# Patient Record
Sex: MALE | Race: WHITE | NOT HISPANIC OR LATINO | Employment: FULL TIME | URBAN - METROPOLITAN AREA
[De-identification: names, ages, dates, MRNs, and addresses within clinical notes are randomized per-mention and may not be internally consistent; named-entity substitution may affect disease eponyms.]

---

## 2017-02-28 ENCOUNTER — OFFICE VISIT (OUTPATIENT)
Dept: FAMILY MEDICINE CLINIC | Facility: CLINIC | Age: 42
End: 2017-02-28

## 2017-02-28 VITALS
HEART RATE: 80 BPM | BODY MASS INDEX: 33.89 KG/M2 | TEMPERATURE: 97.6 F | SYSTOLIC BLOOD PRESSURE: 110 MMHG | RESPIRATION RATE: 16 BRPM | HEIGHT: 72 IN | OXYGEN SATURATION: 98 % | DIASTOLIC BLOOD PRESSURE: 80 MMHG | WEIGHT: 250.2 LBS

## 2017-02-28 DIAGNOSIS — R53.81 MALAISE: ICD-10-CM

## 2017-02-28 DIAGNOSIS — J40 BRONCHITIS: ICD-10-CM

## 2017-02-28 DIAGNOSIS — Z76.89 ENCOUNTER TO ESTABLISH CARE: Primary | ICD-10-CM

## 2017-02-28 LAB
EXPIRATION DATE: NORMAL
FLUAV AG NPH QL: NORMAL
FLUBV AG NPH QL: NORMAL
INTERNAL CONTROL: NORMAL
Lab: NORMAL

## 2017-02-28 PROCEDURE — 99203 OFFICE O/P NEW LOW 30 MIN: CPT | Performed by: PHYSICIAN ASSISTANT

## 2017-02-28 PROCEDURE — 87804 INFLUENZA ASSAY W/OPTIC: CPT | Performed by: PHYSICIAN ASSISTANT

## 2017-02-28 RX ORDER — ALBUTEROL SULFATE 90 UG/1
2 AEROSOL, METERED RESPIRATORY (INHALATION) EVERY 4 HOURS PRN
Qty: 1 INHALER | Refills: 0 | Status: SHIPPED | OUTPATIENT
Start: 2017-02-28

## 2017-02-28 RX ORDER — AZITHROMYCIN 250 MG/1
TABLET, FILM COATED ORAL
Qty: 6 TABLET | Refills: 0 | Status: SHIPPED | OUTPATIENT
Start: 2017-02-28

## 2017-02-28 RX ORDER — DEXTROMETHORPHAN HYDROBROMIDE AND PROMETHAZINE HYDROCHLORIDE 15; 6.25 MG/5ML; MG/5ML
5 SYRUP ORAL 4 TIMES DAILY PRN
Qty: 180 ML | Refills: 0 | Status: SHIPPED | OUTPATIENT
Start: 2017-02-28

## 2017-02-28 RX ORDER — PREDNISONE 10 MG/1
TABLET ORAL
Qty: 21 TABLET | Refills: 0 | Status: SHIPPED | OUTPATIENT
Start: 2017-02-28

## 2017-02-28 NOTE — PROGRESS NOTES
Subjective   Hussain Feliz is a 41 y.o. male.     History of Present Illness   Patient presents to [facility] care with CC of fever, malaise, body aches, chest congestion with tightness, SOB, productive cough, sinus congestion, frontal HA, ear ache (bilateral) X 4-5 days. No known flu exposure. Symptoms are gradually getting worse. Tried cough and cold preps OTC with no relief. Coughing fits leave him exhausted. Feels miserable. No chest pain or abdominal pain.   No significant PMH, usually pretty healthy     The following portions of the patient's history were reviewed and updated as appropriate: allergies, current medications, past family history, past medical history, past social history, past surgical history and problem list.    Review of Systems   Constitutional: Positive for activity change, appetite change, fatigue and fever. Negative for chills and diaphoresis.   HENT: Positive for congestion, ear pain, postnasal drip, sinus pressure and sore throat. Negative for ear discharge, hearing loss, nosebleeds and sneezing.    Eyes: Negative.    Respiratory: Positive for cough, chest tightness, shortness of breath and wheezing.    Cardiovascular: Negative.  Negative for chest pain, palpitations and leg swelling.   Gastrointestinal: Negative for abdominal distention, abdominal pain, anal bleeding, blood in stool, constipation, diarrhea, nausea, rectal pain and vomiting.   Endocrine: Negative.  Negative for cold intolerance, heat intolerance, polydipsia, polyphagia and polyuria.   Genitourinary: Negative.  Negative for difficulty urinating, dysuria, flank pain, frequency, hematuria and urgency.   Musculoskeletal: Positive for myalgias. Negative for arthralgias, back pain, gait problem, joint swelling, neck pain and neck stiffness.   Skin: Negative.  Negative for color change, pallor, rash and wound.   Allergic/Immunologic: Negative.  Negative for immunocompromised state.   Neurological: Positive for headaches. Negative  "for dizziness, syncope, weakness, light-headedness and numbness.   Hematological: Negative.  Negative for adenopathy. Does not bruise/bleed easily.   Psychiatric/Behavioral: Positive for sleep disturbance. Negative for behavioral problems, confusion, self-injury and suicidal ideas. The patient is not nervous/anxious.        Objective    Blood pressure 110/80, pulse 80, temperature 97.6 °F (36.4 °C), resp. rate 16, height 72\" (182.9 cm), weight 250 lb 3.2 oz (113 kg), SpO2 98 %.     Physical Exam   Constitutional: He is oriented to person, place, and time. He appears well-developed and well-nourished. He appears ill.   HENT:   Head: Normocephalic and atraumatic.   Right Ear: External ear and ear canal normal. Tympanic membrane is retracted. Tympanic membrane is not perforated, not erythematous and not bulging.   Left Ear: External ear and ear canal normal. Tympanic membrane is retracted. Tympanic membrane is not perforated, not erythematous and not bulging.   Nose: Rhinorrhea present. Right sinus exhibits frontal sinus tenderness. Right sinus exhibits no maxillary sinus tenderness. Left sinus exhibits frontal sinus tenderness. Left sinus exhibits no maxillary sinus tenderness.   Mouth/Throat: Uvula is midline and mucous membranes are normal. Posterior oropharyngeal erythema present. No oropharyngeal exudate or posterior oropharyngeal edema.   Some bleeding in R naris    Eyes: Conjunctivae and EOM are normal. Pupils are equal, round, and reactive to light.   Neck: Normal range of motion. Neck supple. No tracheal deviation present. No thyromegaly present.   Cardiovascular: Normal rate, regular rhythm, normal heart sounds and intact distal pulses.  Exam reveals no gallop and no friction rub.    No murmur heard.  Pulmonary/Chest: Effort normal. No respiratory distress. He has wheezes. He has no rales. He exhibits no tenderness.   Frequent hacking cough    Abdominal: Soft. Bowel sounds are normal. He exhibits no " distension and no mass. There is no tenderness. There is no rebound and no guarding. No hernia.   Lymphadenopathy:     He has no cervical adenopathy.   Neurological: He is alert and oriented to person, place, and time.   Skin: Skin is warm and dry.   Psychiatric: He has a normal mood and affect. His behavior is normal. Judgment and thought content normal.   Nursing note and vitals reviewed.      Assessment/Plan   Hussain was seen today for np / establish care and malaise, earaches, sore throat, cough & chest congestion.    Diagnoses and all orders for this visit:    Encounter to establish care    Malaise  -     POCT Influenza A/B    Bronchitis  -     azithromycin (ZITHROMAX Z-BUBBA) 250 MG tablet; Take 2 tablets the first day, then 1 tablet daily for 4 days.  -     predniSONE (DELTASONE) 10 MG tablet; Take 60 mg po day 1, 50 mg po day 2, 40 mg po day 3, 30 mg po day 4, 20 mg po day 5, and 10 mg po day 6  -     albuterol (PROVENTIL HFA;VENTOLIN HFA) 108 (90 BASE) MCG/ACT inhaler; Inhale 2 puffs Every 4 (Four) Hours As Needed for wheezing.  -     promethazine-dextromethorphan (PROMETHAZINE-DM) 6.25-15 MG/5ML syrup; Take 5 mL by mouth 4 (Four) Times a Day As Needed for cough.      Influenza A and B negative. Results discussed with patient. Begin treatment as outlined in plan. F/U if symptoms do not improve or if new or worsening symptoms develop. Pt agrees.

## 2022-02-07 ENCOUNTER — TRANSCRIBE ORDERS (OUTPATIENT)
Dept: ADMINISTRATIVE | Facility: HOSPITAL | Age: 47
End: 2022-02-07

## 2022-02-07 ENCOUNTER — HOSPITAL ENCOUNTER (OUTPATIENT)
Dept: GENERAL RADIOLOGY | Facility: HOSPITAL | Age: 47
Discharge: HOME | End: 2022-02-07
Admitting: PHYSICIAN ASSISTANT
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
## 2022-02-07 DIAGNOSIS — M79.605 PAIN IN LEFT LEG: ICD-10-CM

## 2022-02-07 PROCEDURE — 73560 X-RAY EXAM OF KNEE 1 OR 2: CPT

## 2022-02-07 PROCEDURE — 73502 X-RAY EXAM HIP UNI 2-3 VIEWS: CPT

## 2022-02-07 PROCEDURE — 73552 X-RAY EXAM OF FEMUR 2/>: CPT

## 2023-08-28 ENCOUNTER — OFFICE VISIT (OUTPATIENT)
Dept: FAMILY MEDICINE CLINIC | Facility: CLINIC | Age: 48
End: 2023-08-28
Payer: COMMERCIAL

## 2023-08-28 VITALS
SYSTOLIC BLOOD PRESSURE: 126 MMHG | BODY MASS INDEX: 35.7 KG/M2 | WEIGHT: 255 LBS | HEIGHT: 71 IN | TEMPERATURE: 98 F | RESPIRATION RATE: 18 BRPM | OXYGEN SATURATION: 99 % | HEART RATE: 74 BPM | DIASTOLIC BLOOD PRESSURE: 86 MMHG

## 2023-08-28 DIAGNOSIS — R53.82 CHRONIC FATIGUE: ICD-10-CM

## 2023-08-28 DIAGNOSIS — Z13.220 ENCOUNTER FOR LIPID SCREENING FOR CARDIOVASCULAR DISEASE: ICD-10-CM

## 2023-08-28 DIAGNOSIS — Z13.1 SCREENING FOR DIABETES MELLITUS: ICD-10-CM

## 2023-08-28 DIAGNOSIS — Z11.59 NEED FOR HEPATITIS C SCREENING TEST: ICD-10-CM

## 2023-08-28 DIAGNOSIS — K21.9 GASTROESOPHAGEAL REFLUX DISEASE, UNSPECIFIED WHETHER ESOPHAGITIS PRESENT: ICD-10-CM

## 2023-08-28 DIAGNOSIS — Z00.00 ENCOUNTER FOR WELL ADULT EXAM WITHOUT ABNORMAL FINDINGS: Primary | ICD-10-CM

## 2023-08-28 DIAGNOSIS — R79.0 ABNORMAL BLOOD LEVEL OF IRON: ICD-10-CM

## 2023-08-28 DIAGNOSIS — Z13.6 ENCOUNTER FOR LIPID SCREENING FOR CARDIOVASCULAR DISEASE: ICD-10-CM

## 2023-08-28 PROCEDURE — 99386 PREV VISIT NEW AGE 40-64: CPT | Performed by: PHYSICIAN ASSISTANT

## 2023-08-28 RX ORDER — OMEPRAZOLE 20 MG/1
20 CAPSULE, DELAYED RELEASE ORAL DAILY
Qty: 90 CAPSULE | Refills: 3 | Status: SHIPPED | OUTPATIENT
Start: 2023-08-28

## 2023-08-28 NOTE — PROGRESS NOTES
Chief Complaint   Patient presents with    Annual Exam     New patient-open care gaps-not sure date of last tdap-colonoscopy completed 10-12 years ago       Subjective   The patient is a 48 y.o. male who presents to [facility] and for annual exam.    It has been a while since the patient has had blood work.    The patient denies any allergies. Suffers from reflux and heartburn nightly. He does not take medication for gastroesophageal reflux. He has tried TUMS in the past but they do not work. Symptoms have been going on for years. The patient does not eat after 6:00 PM.  His last colonoscopy 12 years ago revealed some polyps. Due for repeat but does not want to schedule at this time     Told he had mild hemochromatosis in the past and was directed by hematology to do regular blood donations. He has not been doing this. States he saw a GI later who told him he did not have hemochromatosis. No routine monitoring of iron levels     The patient has a family history of diabetes and obesity. His son has recently been diagnosed with Mixed Connective Tissue Disease, MCTD. His mother has a lung disease. She was never a smoker, and he was told that it was not hereditary. There is a family history of hypertension. He denies any excess thirst or urination.    The patient's energy level has been down. He had his testosterone checked and it was 239 ng/dl. He was given some capsules to take, and over the last 5 weeks, he feels as if his energy levels are improving. This is being done at a facility in [place], can not remember the name.    The patient denies having sleep apnea, but, according to his wife, he does snore. He notices that, no matter how many hours of sleep he gets, he still experiences daytime fatigue. No hx of sleep study     The patient declines additional COVID-19 vaccines. The patient sees his optometrist and dentist regularly. He underwent Lasix in 2004.    Follows with eye doctor and dentist regularly. Hx  "of Lasix surgery.        Last Colonoscopy:  approximately 12 years ago. Denies updating at this time       Past Medical History,Medications, Allergies reviewed.     Objective     /86   Pulse 74   Temp 98 øF (36.7 øC)   Resp 18   Ht 180.3 cm (71\")   Wt 116 kg (255 lb)   SpO2 99%   BMI 35.57 kg/mý     Physical Exam  Vitals and nursing note reviewed.   Constitutional:       Appearance: Normal appearance. He is well-developed.   HENT:      Head: Normocephalic and atraumatic.      Right Ear: Tympanic membrane, ear canal and external ear normal.      Left Ear: Tympanic membrane, ear canal and external ear normal.      Nose: Nose normal.      Mouth/Throat:      Pharynx: No oropharyngeal exudate or posterior oropharyngeal erythema.   Eyes:      Conjunctiva/sclera: Conjunctivae normal.   Neck:      Thyroid: No thyromegaly.      Trachea: No tracheal deviation.   Cardiovascular:      Rate and Rhythm: Normal rate and regular rhythm.      Heart sounds: Normal heart sounds.   Pulmonary:      Effort: Pulmonary effort is normal. No respiratory distress.      Breath sounds: Normal breath sounds. No wheezing or rales.   Chest:      Chest wall: No tenderness.   Abdominal:      General: Bowel sounds are normal. There is no distension.      Palpations: Abdomen is soft. There is no mass.      Tenderness: There is no abdominal tenderness. There is no guarding or rebound.      Hernia: No hernia is present.   Musculoskeletal:      Cervical back: Normal range of motion and neck supple.   Lymphadenopathy:      Cervical: No cervical adenopathy.   Skin:     General: Skin is warm and dry.   Neurological:      Mental Status: He is alert and oriented to person, place, and time.   Psychiatric:         Mood and Affect: Mood normal.         Behavior: Behavior normal.         Thought Content: Thought content normal.         Judgment: Judgment normal.       Assessment & Plan     1. Encounter for well adult exam without abnormal findings  - " CBC w AUTO Differential  - Comprehensive metabolic panel  - Hemoglobin A1c  - Hepatitis C antibody  - Iron Profile  - Hemochromatosis Mutation  - TSH  - T4, free  - Testosterone  - Vitamin B12  - Folate  - Lipid Panel    2. Abnormal blood level of iron  - Iron Profile  - Hemochromatosis Mutation    3. Chronic fatigue  - CBC w AUTO Differential  - Comprehensive metabolic panel  - Iron Profile  - TSH  - T4, free  - Testosterone  - Vitamin B12  - Folate    4. Encounter for lipid screening for cardiovascular disease  - Lipid Panel    5. Screening for diabetes mellitus  - Hemoglobin A1c    6. Need for hepatitis C screening test  - Hepatitis C antibody    7. Gastroesophageal reflux disease, unspecified whether esophagitis present  - omeprazole (priLOSEC) 20 MG capsule; Take 1 capsule by mouth Daily.  Dispense: 90 capsule; Refill: 3    Screening labs as noted    Gastroesophageal reflux   - The patient was advised to avoid foods such as alcohol, caffeine, spicy foods, and citrus.  - I will prescribe omeprazole.     Possible sleep apnea  - declines sleep study at this time. Pt will notify office if he wishes to proceed with this     Colon cancer screening  Patient declines colonoscopy at this time.    Counseling was given to patient for the following topics: instructions for management, risk factor reductions, patient and family education, and impressions . Total time of the encounter was 20 minutes and 10 minutes was spent counseling.      JEANNINE Marie    Transcribed from ambient dictation for JEANNINE Marie by Makayla Guerrero.  08/28/23   12:39 EDT    Patient or patient representative verbalized consent to the visit recording.  I have personally performed the services described in this document as transcribed by the above individual, and it is both accurate and complete.

## 2023-08-29 PROBLEM — R79.0 ABNORMAL BLOOD LEVEL OF IRON: Status: ACTIVE | Noted: 2023-08-29

## 2023-09-07 LAB
ALBUMIN SERPL-MCNC: 4.5 G/DL (ref 4.1–5.1)
ALBUMIN/GLOB SERPL: 2 {RATIO} (ref 1.2–2.2)
ALP SERPL-CCNC: 84 IU/L (ref 44–121)
ALT SERPL-CCNC: 34 IU/L (ref 0–44)
AST SERPL-CCNC: 29 IU/L (ref 0–40)
BASOPHILS # BLD AUTO: 0 X10E3/UL (ref 0–0.2)
BASOPHILS NFR BLD AUTO: 1 %
BILIRUB SERPL-MCNC: 0.8 MG/DL (ref 0–1.2)
BUN SERPL-MCNC: 8 MG/DL (ref 6–24)
BUN/CREAT SERPL: 8 (ref 9–20)
CALCIUM SERPL-MCNC: 9.1 MG/DL (ref 8.7–10.2)
CHLORIDE SERPL-SCNC: 104 MMOL/L (ref 96–106)
CHOLEST SERPL-MCNC: 154 MG/DL (ref 100–199)
CO2 SERPL-SCNC: 21 MMOL/L (ref 20–29)
CREAT SERPL-MCNC: 1.02 MG/DL (ref 0.76–1.27)
EGFRCR SERPLBLD CKD-EPI 2021: 91 ML/MIN/1.73
EOSINOPHIL # BLD AUTO: 0.2 X10E3/UL (ref 0–0.4)
EOSINOPHIL NFR BLD AUTO: 4 %
ERYTHROCYTE [DISTWIDTH] IN BLOOD BY AUTOMATED COUNT: 13.1 % (ref 11.6–15.4)
FOLATE SERPL-MCNC: 3.9 NG/ML
GLOBULIN SER CALC-MCNC: 2.2 G/DL (ref 1.5–4.5)
GLUCOSE SERPL-MCNC: 81 MG/DL (ref 70–99)
HBA1C MFR BLD: 4.9 % (ref 4.8–5.6)
HCT VFR BLD AUTO: 49.7 % (ref 37.5–51)
HCV IGG SERPL QL IA: NON REACTIVE
HDLC SERPL-MCNC: 28 MG/DL
HFE GENE MUT ANL BLD/T: NORMAL
HGB BLD-MCNC: 17.1 G/DL (ref 13–17.7)
IMM GRANULOCYTES # BLD AUTO: 0 X10E3/UL (ref 0–0.1)
IMM GRANULOCYTES NFR BLD AUTO: 0 %
IMP & REVIEW OF LAB RESULTS: NORMAL
IRON SATN MFR SERPL: 28 % (ref 15–55)
IRON SERPL-MCNC: 100 UG/DL (ref 38–169)
LDLC SERPL CALC-MCNC: 107 MG/DL (ref 0–99)
LYMPHOCYTES # BLD AUTO: 1.4 X10E3/UL (ref 0.7–3.1)
LYMPHOCYTES NFR BLD AUTO: 28 %
MCH RBC QN AUTO: 32.4 PG (ref 26.6–33)
MCHC RBC AUTO-ENTMCNC: 34.4 G/DL (ref 31.5–35.7)
MCV RBC AUTO: 94 FL (ref 79–97)
MONOCYTES # BLD AUTO: 0.4 X10E3/UL (ref 0.1–0.9)
MONOCYTES NFR BLD AUTO: 7 %
NEUTROPHILS # BLD AUTO: 3.1 X10E3/UL (ref 1.4–7)
NEUTROPHILS NFR BLD AUTO: 60 %
PLATELET # BLD AUTO: 263 X10E3/UL (ref 150–450)
POTASSIUM SERPL-SCNC: 4.4 MMOL/L (ref 3.5–5.2)
PROT SERPL-MCNC: 6.7 G/DL (ref 6–8.5)
RBC # BLD AUTO: 5.28 X10E6/UL (ref 4.14–5.8)
SODIUM SERPL-SCNC: 138 MMOL/L (ref 134–144)
T4 FREE SERPL-MCNC: 1.22 NG/DL (ref 0.82–1.77)
TESTOST SERPL-MCNC: 1411 NG/DL (ref 264–916)
TIBC SERPL-MCNC: 363 UG/DL (ref 250–450)
TRIGL SERPL-MCNC: 100 MG/DL (ref 0–149)
TSH SERPL DL<=0.005 MIU/L-ACNC: 1.82 UIU/ML (ref 0.45–4.5)
UIBC SERPL-MCNC: 263 UG/DL (ref 111–343)
VIT B12 SERPL-MCNC: 500 PG/ML (ref 232–1245)
VLDLC SERPL CALC-MCNC: 19 MG/DL (ref 5–40)
WBC # BLD AUTO: 5.2 X10E3/UL (ref 3.4–10.8)

## 2023-12-29 ENCOUNTER — OFFICE VISIT (OUTPATIENT)
Dept: FAMILY MEDICINE CLINIC | Facility: CLINIC | Age: 48
End: 2023-12-29
Payer: COMMERCIAL

## 2023-12-29 VITALS
WEIGHT: 240.4 LBS | HEIGHT: 71 IN | BODY MASS INDEX: 33.65 KG/M2 | DIASTOLIC BLOOD PRESSURE: 90 MMHG | RESPIRATION RATE: 14 BRPM | HEART RATE: 97 BPM | SYSTOLIC BLOOD PRESSURE: 136 MMHG | TEMPERATURE: 97.7 F | OXYGEN SATURATION: 98 %

## 2023-12-29 DIAGNOSIS — R05.1 ACUTE COUGH: Primary | ICD-10-CM

## 2023-12-29 PROCEDURE — 99213 OFFICE O/P EST LOW 20 MIN: CPT | Performed by: FAMILY MEDICINE

## 2023-12-29 RX ORDER — DEXTROMETHORPHAN HYDROBROMIDE AND PROMETHAZINE HYDROCHLORIDE 15; 6.25 MG/5ML; MG/5ML
5 SYRUP ORAL 4 TIMES DAILY PRN
Qty: 180 ML | Refills: 0 | Status: SHIPPED | OUTPATIENT
Start: 2023-12-29

## 2023-12-29 RX ORDER — AZITHROMYCIN 250 MG/1
TABLET, FILM COATED ORAL
Qty: 6 TABLET | Refills: 0 | Status: SHIPPED | OUTPATIENT
Start: 2023-12-29

## 2023-12-29 NOTE — PROGRESS NOTES
"Chief Complaint  Generalized Body Aches (Cough and body aches started yesterday. Pt does not want to be covid/flu tested. )    Subjective          Hussain Feliz presents to [facility]  Cough  This is a new problem. The current episode started yesterday. The problem has been worsening. The problem occurs every few minutes. The cough is Dry. Associated symptoms include myalgias. Pertinent negatives include no ear pain, fever, headaches, nasal congestion, sore throat or shortness of breath. Associated symptoms comments: fatigue  .     No ill contact        Review of Systems   Constitutional:  Negative for fever.   HENT:  Negative for ear pain and sore throat.    Respiratory:  Positive for cough. Negative for shortness of breath.    Musculoskeletal:  Positive for myalgias.        Objective       Vital Signs:   /90   Pulse 97   Temp 97.7 °F (36.5 °C)   Resp 14   Ht 180.3 cm (71\")   Wt 109 kg (240 lb 6.4 oz)   SpO2 98%   BMI 33.53 kg/m²     Physical Exam  Vitals and nursing note reviewed.   Constitutional:       General: He is not in acute distress.     Appearance: Normal appearance. He is well-developed. He is ill-appearing. He is not toxic-appearing.   HENT:      Head: Normocephalic and atraumatic.      Right Ear: Tympanic membrane, ear canal and external ear normal.      Left Ear: Tympanic membrane, ear canal and external ear normal.      Mouth/Throat:      Mouth: Mucous membranes are moist.      Pharynx: No oropharyngeal exudate or posterior oropharyngeal erythema.   Eyes:      Pupils: Pupils are equal, round, and reactive to light.   Cardiovascular:      Rate and Rhythm: Normal rate and regular rhythm.      Heart sounds: Normal heart sounds.   Pulmonary:      Effort: Pulmonary effort is normal. No respiratory distress.      Breath sounds: Rhonchi present. No wheezing or rales.   Skin:     General: Skin is warm and dry.   Neurological:      Mental Status: He is alert. "   Psychiatric:         Behavior: Behavior normal.          Result Review :                     Assessment and Plan    Diagnoses and all orders for this visit:    1. Acute cough (Primary)  -     azithromycin (Zithromax) 250 MG tablet; Take 2 tablets the first day, then 1 tablet daily for 4 days.  Dispense: 6 tablet; Refill: 0  -     promethazine-dextromethorphan (PROMETHAZINE-DM) 6.25-15 MG/5ML syrup; Take 5 mL by mouth 4 (Four) Times a Day As Needed for Cough.  Dispense: 180 mL; Refill: 0          DISCUSSION    Acute onset of cough and myalgia.  Explained that this could be either COVID or influenza.  He does not wish to have a COVID or flu test that at this time.  Will go ahead and give him a prescription for Z-Chalo if he is not improving to cover for any type of bacterial infection.  Continue to increase fluids and rest Promethazine DM for cough.  I explained to him that if he worsens over the weekend he should do a COVID test at minimum.  He expressed understanding.    He reports that he will be off work till Tuesday anyway so that would be the 5-day quarantine period.           Follow Up   Return if symptoms worsen or fail to improve.    Patient was given instructions and counseling regarding his condition or for health maintenance advice. Please see specific information pulled into the AVS if appropriate.       Chuy Alcantara MD

## 2024-11-01 ENCOUNTER — OFFICE VISIT (OUTPATIENT)
Dept: FAMILY MEDICINE CLINIC | Facility: CLINIC | Age: 49
End: 2024-11-01
Payer: COMMERCIAL

## 2024-11-01 VITALS
HEART RATE: 73 BPM | BODY MASS INDEX: 34.58 KG/M2 | HEIGHT: 71 IN | OXYGEN SATURATION: 100 % | TEMPERATURE: 98.2 F | RESPIRATION RATE: 14 BRPM | DIASTOLIC BLOOD PRESSURE: 80 MMHG | SYSTOLIC BLOOD PRESSURE: 124 MMHG | WEIGHT: 247 LBS

## 2024-11-01 DIAGNOSIS — R05.1 ACUTE COUGH: ICD-10-CM

## 2024-11-01 DIAGNOSIS — R53.83 OTHER FATIGUE: ICD-10-CM

## 2024-11-01 DIAGNOSIS — J06.9 ACUTE UPPER RESPIRATORY INFECTION: Primary | ICD-10-CM

## 2024-11-01 LAB
EXPIRATION DATE: NORMAL
FLUAV AG UPPER RESP QL IA.RAPID: NOT DETECTED
FLUBV AG UPPER RESP QL IA.RAPID: NOT DETECTED
INTERNAL CONTROL: NORMAL
Lab: NORMAL
SARS-COV-2 AG UPPER RESP QL IA.RAPID: NOT DETECTED

## 2024-11-01 PROCEDURE — 99214 OFFICE O/P EST MOD 30 MIN: CPT | Performed by: NURSE PRACTITIONER

## 2024-11-01 PROCEDURE — 87428 SARSCOV & INF VIR A&B AG IA: CPT | Performed by: NURSE PRACTITIONER

## 2024-11-01 RX ORDER — DEXTROMETHORPHAN HYDROBROMIDE AND PROMETHAZINE HYDROCHLORIDE 15; 6.25 MG/5ML; MG/5ML
5 SYRUP ORAL 4 TIMES DAILY PRN
Qty: 100 ML | Refills: 0 | Status: SHIPPED | OUTPATIENT
Start: 2024-11-01 | End: 2024-11-06

## 2024-11-01 RX ORDER — AZITHROMYCIN 250 MG/1
TABLET, FILM COATED ORAL
Qty: 6 TABLET | Refills: 0 | Status: SHIPPED | OUTPATIENT
Start: 2024-11-01

## 2024-11-01 RX ORDER — METHYLPREDNISOLONE 4 MG/1
TABLET ORAL
Qty: 21 TABLET | Refills: 0 | Status: SHIPPED | OUTPATIENT
Start: 2024-11-01

## 2024-11-01 NOTE — PROGRESS NOTES
Date: 2024   Patient Name: Hussain Feliz  : 1975   MRN: [id number]     Chief Complaint:    Chief Complaint   Patient presents with    Illness     Cough, headache, congestion & fatigue since Saturday.       History of Present Illness: Hussain Feliz is a 49 y.o. male who is here today for Cough, congestion, headache, fatigue that started Saturday  Taking tylneol and ibuprofen without relief of symptoms  No other assicated symptoms  No known exposure to illnesses    Illness  Associated symptoms include congestion, coughing and fatigue.     Review of Systems:   Review of Systems   Constitutional:  Positive for fatigue.   HENT:  Positive for congestion.    Respiratory:  Positive for cough.    Neurological:  Positive for headache.       Past Medical History:   Past Medical History:   Diagnosis Date    Heart burn        Past Surgical History: History reviewed. No pertinent surgical history.    Family History:   Family History   Problem Relation Age of Onset    Hypertension Mother     Obesity Mother     Arthritis Mother        Social History:   Social History     Socioeconomic History    Marital status:    Tobacco Use    Smoking status: Some Days     Current packs/day: 1.00     Types: Cigarettes    Smokeless tobacco: Never    Tobacco comments:     1 pack per week   Vaping Use    Vaping status: Never Used   Substance and Sexual Activity    Alcohol use: Yes     Comment: maybe one drink week    Drug use: Never    Sexual activity: Defer       Medications:     Current Outpatient Medications:     omeprazole (priLOSEC) 20 MG capsule, Take 1 capsule by mouth Daily., Disp: 90 capsule, Rfl: 3    azithromycin (Zithromax Z-Chalo) 250 MG tablet, Take 2 tablets by mouth on day 1, then 1 tablet daily on days 2-5, Disp: 6 tablet, Rfl: 0    methylPREDNISolone (MEDROL) 4 MG dose pack, Take as directed on package instructions., Disp: 21 tablet, Rfl: 0    promethazine-dextromethorphan (PROMETHAZINE-DM) 6.25-15  "MG/5ML syrup, Take 5 mL by mouth 4 (Four) Times a Day As Needed for Cough for up to 5 days., Disp: 100 mL, Rfl: 0    Allergies:   No Known Allergies      Physical Exam:  Vital Signs:   Vitals:    11/01/24 1237   BP: 124/80   Pulse: 73   Resp: 14   Temp: 98.2 °F (36.8 °C)   SpO2: 100%   Weight: 112 kg (247 lb)   Height: 180.3 cm (71\")     Body mass index is 34.45 kg/m².     Physical Exam  Vitals and nursing note reviewed.   Constitutional:       Appearance: He is not ill-appearing.   HENT:      Head: Normocephalic and atraumatic.      Right Ear: External ear normal. No middle ear effusion. Tympanic membrane is not erythematous or bulging.      Left Ear: External ear normal.  No middle ear effusion. Tympanic membrane is not erythematous or bulging.      Nose: No nasal tenderness or congestion.      Right Turbinates: Swollen. Not enlarged.      Left Turbinates: Swollen. Not enlarged.      Right Sinus: Maxillary sinus tenderness present.      Left Sinus: Maxillary sinus tenderness present.      Mouth/Throat:      Mouth: Mucous membranes are moist.      Pharynx: No pharyngeal swelling or posterior oropharyngeal erythema.      Tonsils: No tonsillar exudate.   Eyes:      Pupils: Pupils are equal, round, and reactive to light.   Cardiovascular:      Rate and Rhythm: Normal rate and regular rhythm.   Pulmonary:      Effort: Pulmonary effort is normal.      Breath sounds: Normal breath sounds.   Abdominal:      General: Bowel sounds are normal.   Musculoskeletal:      Cervical back: Normal range of motion and neck supple.   Skin:     General: Skin is warm.   Neurological:      General: No focal deficit present.      Mental Status: He is alert and oriented to person, place, and time.   Psychiatric:         Mood and Affect: Mood normal.           Assessment/Plan:   Diagnoses and all orders for this visit:    1. Acute upper respiratory infection (Primary)  -     azithromycin (Zithromax Z-Chalo) 250 MG tablet; Take 2 tablets by " mouth on day 1, then 1 tablet daily on days 2-5  Dispense: 6 tablet; Refill: 0  -     methylPREDNISolone (MEDROL) 4 MG dose pack; Take as directed on package instructions.  Dispense: 21 tablet; Refill: 0    2. Other fatigue  -     POCT SARS-CoV-2 Antigen ABENA + Flu    3. Acute cough  -     promethazine-dextromethorphan (PROMETHAZINE-DM) 6.25-15 MG/5ML syrup; Take 5 mL by mouth 4 (Four) Times a Day As Needed for Cough for up to 5 days.  Dispense: 100 mL; Refill: 0       Negative flu and covid\  Increase fluid intake  Take cough medicine as prescribed avoid driving when taking medication.  Monitor for worsening symptoms  Tylenol and ibuprofen as needed for aches and fever.  Go to the ER for any shortness of breath, chest pains, fever uncontrolled by medications.      Follow Up:   Return if symptoms worsen or fail to improve.    Anamaria Mcguire. IRENE   [facility]

## 2024-11-04 ENCOUNTER — APPOINTMENT (OUTPATIENT)
Facility: HOSPITAL | Age: 49
End: 2024-11-04
Payer: COMMERCIAL

## 2024-11-04 ENCOUNTER — HOSPITAL ENCOUNTER (EMERGENCY)
Facility: HOSPITAL | Age: 49
Discharge: HOME | End: 2024-11-04
Attending: EMERGENCY MEDICINE | Admitting: EMERGENCY MEDICINE
Payer: COMMERCIAL

## 2024-11-04 VITALS
BODY MASS INDEX: 34.3 KG/M2 | DIASTOLIC BLOOD PRESSURE: 77 MMHG | RESPIRATION RATE: 11 BRPM | HEIGHT: 71 IN | OXYGEN SATURATION: 98 % | WEIGHT: 245 LBS | TEMPERATURE: 98.2 F | HEART RATE: 68 BPM | SYSTOLIC BLOOD PRESSURE: 132 MMHG

## 2024-11-04 DIAGNOSIS — J18.0 BRONCHOPNEUMONIA: Primary | ICD-10-CM

## 2024-11-04 LAB
ALBUMIN SERPL-MCNC: 4.1 G/DL (ref 3.5–5.2)
ALBUMIN/GLOB SERPL: 1.8 G/DL
ALP SERPL-CCNC: 72 U/L (ref 39–117)
ALT SERPL W P-5'-P-CCNC: 29 U/L (ref 1–41)
ANION GAP SERPL CALCULATED.3IONS-SCNC: 6.6 MMOL/L (ref 5–15)
AST SERPL-CCNC: 30 U/L (ref 1–40)
B PARAPERT DNA SPEC QL NAA+PROBE: NOT DETECTED
B PERT DNA SPEC QL NAA+PROBE: NOT DETECTED
BASOPHILS # BLD AUTO: 0.04 10*3/MM3 (ref 0–0.2)
BASOPHILS NFR BLD AUTO: 0.5 % (ref 0–1.5)
BILIRUB SERPL-MCNC: 0.6 MG/DL (ref 0–1.2)
BUN SERPL-MCNC: 13 MG/DL (ref 6–20)
BUN/CREAT SERPL: 13.4 (ref 7–25)
C PNEUM DNA NPH QL NAA+NON-PROBE: NOT DETECTED
CALCIUM SPEC-SCNC: 8.3 MG/DL (ref 8.6–10.5)
CHLORIDE SERPL-SCNC: 109 MMOL/L (ref 98–107)
CO2 SERPL-SCNC: 26.4 MMOL/L (ref 22–29)
CREAT SERPL-MCNC: 0.97 MG/DL (ref 0.76–1.27)
DEPRECATED RDW RBC AUTO: 42.9 FL (ref 37–54)
EGFRCR SERPLBLD CKD-EPI 2021: 96.3 ML/MIN/1.73
EOSINOPHIL # BLD AUTO: 0.16 10*3/MM3 (ref 0–0.4)
EOSINOPHIL NFR BLD AUTO: 2.1 % (ref 0.3–6.2)
ERYTHROCYTE [DISTWIDTH] IN BLOOD BY AUTOMATED COUNT: 12.5 % (ref 12.3–15.4)
FLUAV SUBTYP SPEC NAA+PROBE: NOT DETECTED
FLUBV RNA ISLT QL NAA+PROBE: NOT DETECTED
GLOBULIN UR ELPH-MCNC: 2.3 GM/DL
GLUCOSE SERPL-MCNC: 97 MG/DL (ref 65–99)
HADV DNA SPEC NAA+PROBE: NOT DETECTED
HCOV 229E RNA SPEC QL NAA+PROBE: NOT DETECTED
HCOV HKU1 RNA SPEC QL NAA+PROBE: NOT DETECTED
HCOV NL63 RNA SPEC QL NAA+PROBE: NOT DETECTED
HCOV OC43 RNA SPEC QL NAA+PROBE: NOT DETECTED
HCT VFR BLD AUTO: 46.8 % (ref 37.5–51)
HGB BLD-MCNC: 16.3 G/DL (ref 13–17.7)
HMPV RNA NPH QL NAA+NON-PROBE: NOT DETECTED
HOLD SPECIMEN: NORMAL
HPIV1 RNA ISLT QL NAA+PROBE: NOT DETECTED
HPIV2 RNA SPEC QL NAA+PROBE: NOT DETECTED
HPIV3 RNA NPH QL NAA+PROBE: NOT DETECTED
HPIV4 P GENE NPH QL NAA+PROBE: NOT DETECTED
IMM GRANULOCYTES # BLD AUTO: 0.02 10*3/MM3 (ref 0–0.05)
IMM GRANULOCYTES NFR BLD AUTO: 0.3 % (ref 0–0.5)
LYMPHOCYTES # BLD AUTO: 2.73 10*3/MM3 (ref 0.7–3.1)
LYMPHOCYTES NFR BLD AUTO: 35.7 % (ref 19.6–45.3)
M PNEUMO IGG SER IA-ACNC: NOT DETECTED
MAGNESIUM SERPL-MCNC: 2.1 MG/DL (ref 1.6–2.6)
MCH RBC QN AUTO: 32.6 PG (ref 26.6–33)
MCHC RBC AUTO-ENTMCNC: 34.8 G/DL (ref 31.5–35.7)
MCV RBC AUTO: 93.6 FL (ref 79–97)
MONOCYTES # BLD AUTO: 0.41 10*3/MM3 (ref 0.1–0.9)
MONOCYTES NFR BLD AUTO: 5.4 % (ref 5–12)
NEUTROPHILS NFR BLD AUTO: 4.29 10*3/MM3 (ref 1.7–7)
NEUTROPHILS NFR BLD AUTO: 56 % (ref 42.7–76)
PLATELET # BLD AUTO: 211 10*3/MM3 (ref 140–450)
PMV BLD AUTO: 9.7 FL (ref 6–12)
POTASSIUM SERPL-SCNC: 3.8 MMOL/L (ref 3.5–5.2)
PROCALCITONIN SERPL-MCNC: 0.05 NG/ML (ref 0–0.25)
PROT SERPL-MCNC: 6.4 G/DL (ref 6–8.5)
RBC # BLD AUTO: 5 10*6/MM3 (ref 4.14–5.8)
RHINOVIRUS RNA SPEC NAA+PROBE: NOT DETECTED
RSV RNA NPH QL NAA+NON-PROBE: NOT DETECTED
SARS-COV-2 RNA RESP QL NAA+PROBE: NOT DETECTED
SODIUM SERPL-SCNC: 142 MMOL/L (ref 136–145)
TROPONIN T SERPL HS-MCNC: <6 NG/L
WBC NRBC COR # BLD AUTO: 7.65 10*3/MM3 (ref 3.4–10.8)
WHOLE BLOOD HOLD COAG: NORMAL
WHOLE BLOOD HOLD SPECIMEN: NORMAL

## 2024-11-04 PROCEDURE — 93005 ELECTROCARDIOGRAM TRACING: CPT | Performed by: EMERGENCY MEDICINE

## 2024-11-04 PROCEDURE — 80053 COMPREHEN METABOLIC PANEL: CPT | Performed by: EMERGENCY MEDICINE

## 2024-11-04 PROCEDURE — 83735 ASSAY OF MAGNESIUM: CPT | Performed by: EMERGENCY MEDICINE

## 2024-11-04 PROCEDURE — 36415 COLL VENOUS BLD VENIPUNCTURE: CPT

## 2024-11-04 PROCEDURE — 94640 AIRWAY INHALATION TREATMENT: CPT

## 2024-11-04 PROCEDURE — 85025 COMPLETE CBC W/AUTO DIFF WBC: CPT | Performed by: EMERGENCY MEDICINE

## 2024-11-04 PROCEDURE — 99284 EMERGENCY DEPT VISIT MOD MDM: CPT

## 2024-11-04 PROCEDURE — 71046 X-RAY EXAM CHEST 2 VIEWS: CPT

## 2024-11-04 PROCEDURE — 0202U NFCT DS 22 TRGT SARS-COV-2: CPT | Performed by: EMERGENCY MEDICINE

## 2024-11-04 PROCEDURE — 84484 ASSAY OF TROPONIN QUANT: CPT | Performed by: EMERGENCY MEDICINE

## 2024-11-04 PROCEDURE — 84145 PROCALCITONIN (PCT): CPT | Performed by: EMERGENCY MEDICINE

## 2024-11-04 RX ORDER — LIDOCAINE HYDROCHLORIDE 40 MG/ML
4 INJECTION, SOLUTION RETROBULBAR; TOPICAL ONCE
Status: COMPLETED | OUTPATIENT
Start: 2024-11-04 | End: 2024-11-04

## 2024-11-04 RX ORDER — ALBUTEROL SULFATE 90 UG/1
2 INHALANT RESPIRATORY (INHALATION) EVERY 4 HOURS PRN
Qty: 8.5 G | Refills: 0 | Status: ON HOLD | OUTPATIENT
Start: 2024-11-04

## 2024-11-04 RX ORDER — SODIUM CHLORIDE 0.9 % (FLUSH) 0.9 %
10 SYRINGE (ML) INJECTION AS NEEDED
Status: DISCONTINUED | OUTPATIENT
Start: 2024-11-04 | End: 2024-11-04 | Stop reason: HOSPADM

## 2024-11-04 RX ADMIN — LIDOCAINE HYDROCHLORIDE 4 ML: 40 INJECTION, SOLUTION RETROBULBAR at 10:53

## 2024-11-04 NOTE — [facility] PROVIDER NOTE
Subjective  History of Present Illness:    Patient since Emergency Department after passing out.  His wife is a nurse and comes with him.  He has had cough congestion fatigue for the last 9 days.  Was seen in the last 5 days tested for COVID and was negative.  Diagnosed with upper respiratory infection.  Started on azithromycin and steroids and promethazine/dextromethorphan cough syrup.  Over the weekend he had an episode of coughing when first waking up and passed out.  He states he remembers what happened he did not lose full awareness. he had loss of full visual fields and some myoclonic jerking which resolved spontaneously.  Had another episode this morning.  This was not preceded by or followed by sudden onset severe headache.  Had no chest pain shortness of breath.  Second episode today he states he had a coughing spell but does think he lost awareness.  Wife witnessed both episodes.  She also reports myoclonic jerking lasting less than 10 seconds with no postictal phase.    Nurses Notes reviewed and agree, including vitals, allergies, social history and prior medical history.     REVIEW OF SYSTEMS: All systems reviewed and not pertinent unless noted.    Past Medical History:   Diagnosis Date    Heart burn        Allergies:    Patient has no known allergies.      No past surgical history on file.      Social History     Socioeconomic History    Marital status:    Tobacco Use    Smoking status: Some Days     Current packs/day: 1.00     Types: Cigarettes    Smokeless tobacco: Never    Tobacco comments:     1 pack per week   Vaping Use    Vaping status: Never Used   Substance and Sexual Activity    Alcohol use: Yes     Comment: maybe one drink week    Drug use: Never    Sexual activity: Defer         Family History   Problem Relation Age of Onset    Hypertension Mother     Obesity Mother     Arthritis Mother        Objective  Physical Exam:  /89   Pulse 59   Temp 98.2 °F (36.8 °C) (Oral)   Resp 11  "  Ht 180.3 cm (71\")   Wt 111 kg (245 lb)   SpO2 98%   BMI 34.17 kg/m²      [Primary Survey    Airway: Patent and protected  Breathing: Symmetric bilaterally  Circulation: Mentating well, responsive        Constitutional: Nontoxic appearance.  Psychological: No abnormalities of mood affect.  Head: Atraumatic  Eyes: Conjunctiva are non-injected. no scleral icterus.  ENT: No obvious congestion or obstruction noted  Neck: No obvious deformity.  ROM appears preserved  Chest: No deformity noted.  No paradoxical breathing noted  Respiratory: Respiratory effort was normal - no use of accessory respiratory muscles noted.  There is no stridor.  Crackles and rhonchi in the left lower lobe  Cardiovascular: Perfusion appears preserved - mentating well RRR no murmurs  Gastrointestinal: Abdomen nondistended.  Genitourinary: Not examined  Lymphatic: Not examined  Back: Not examined  Musculoskeletal: Musculoskeletal system is grossly intact.  There is no obvious deformity.  Neurological: Face: No Asymmetry.  Gross motor movement is intact in all 4 extremities.  Walks and ambulates without difficulty.  Patient exhibits normal speech.  Skin: No Pallor no obvious bruising.  No obvious rash.]      ED Course:    Lab Results (last 24 hours)       Procedure Component Value Units Date/Time    CBC & Differential [[id number]]  (Normal) Collected: 11/04/24 1017    Specimen: Blood Updated: 11/04/24 1023    Narrative:      The following orders were created for panel order CBC & Differential.  Procedure                               Abnormality         Status                     ---------                               -----------         ------                     CBC Auto Differential[[id number]]        Normal              Final result                 Please view results for these tests on the individual orders.    Comprehensive Metabolic Panel [[id number]]  (Abnormal) Collected: 11/04/24 1017    Specimen: Blood Updated: 11/04/24 1046     " Glucose 97 mg/dL      BUN 13 mg/dL      Creatinine 0.97 mg/dL      Sodium 142 mmol/L      Potassium 3.8 mmol/L      Chloride 109 mmol/L      CO2 26.4 mmol/L      Calcium 8.3 mg/dL      Total Protein 6.4 g/dL      Albumin 4.1 g/dL      ALT (SGPT) 29 U/L      AST (SGOT) 30 U/L      Alkaline Phosphatase 72 U/L      Total Bilirubin 0.6 mg/dL      Globulin 2.3 gm/dL      A/G Ratio 1.8 g/dL      BUN/Creatinine Ratio 13.4     Anion Gap 6.6 mmol/L      eGFR 96.3 mL/min/1.73     Narrative:      GFR Normal >60  Chronic Kidney Disease <60  Kidney Failure <15      Magnesium [[id number]]  (Normal) Collected: 11/04/24 1017    Specimen: Blood Updated: 11/04/24 1046     Magnesium 2.1 mg/dL     Single High Sensitivity Troponin T [[id number]]  (Normal) Collected: 11/04/24 1017    Specimen: Blood Updated: 11/04/24 1043     HS Troponin T <6 ng/L     CBC Auto Differential [[id number]]  (Normal) Collected: 11/04/24 1017    Specimen: Blood Updated: 11/04/24 1023     WBC 7.65 10*3/mm3      RBC 5.00 10*6/mm3      Hemoglobin 16.3 g/dL      Hematocrit 46.8 %      MCV 93.6 fL      MCH 32.6 pg      MCHC 34.8 g/dL      RDW 12.5 %      RDW-SD 42.9 fl      MPV 9.7 fL      Platelets 211 10*3/mm3      Neutrophil % 56.0 %      Lymphocyte % 35.7 %      Monocyte % 5.4 %      Eosinophil % 2.1 %      Basophil % 0.5 %      Immature Grans % 0.3 %      Neutrophils, Absolute 4.29 10*3/mm3      Lymphocytes, Absolute 2.73 10*3/mm3      Monocytes, Absolute 0.41 10*3/mm3      Eosinophils, Absolute 0.16 10*3/mm3      Basophils, Absolute 0.04 10*3/mm3      Immature Grans, Absolute 0.02 10*3/mm3     Procalcitonin [[id number]]  (Normal) Collected: 11/04/24 1017    Specimen: Blood Updated: 11/04/24 1130     Procalcitonin 0.05 ng/mL     Respiratory Panel PCR w/COVID-19(SARS-CoV-2) NATANAEL/YAA/LUCIE/PAD/COR/JOSE In-House, NP Swab in UTM/VTM, 2 HR TAT - Swab, Nasopharynx [[id number]]  (Normal) Collected: 11/04/24 1052    Specimen: Swab from Nasopharynx Updated: 11/04/24 1149      ADENOVIRUS, PCR Not Detected     Coronavirus 229E Not Detected     Coronavirus HKU1 Not Detected     Coronavirus NL63 Not Detected     Coronavirus OC43 Not Detected     COVID19 Not Detected     Human Metapneumovirus Not Detected     Human Rhinovirus/Enterovirus Not Detected     Influenza A PCR Not Detected     Influenza B PCR Not Detected     Parainfluenza Virus 1 Not Detected     Parainfluenza Virus 2 Not Detected     Parainfluenza Virus 3 Not Detected     Parainfluenza Virus 4 Not Detected     RSV, PCR Not Detected     Bordetella pertussis pcr Not Detected     Bordetella parapertussis PCR Not Detected     Chlamydophila pneumoniae PCR Not Detected     Mycoplasma pneumo by PCR Not Detected    Narrative:      In the setting of a positive respiratory panel with a viral infection PLUS a negative procalcitonin without other underlying concern for bacterial infection, consider observing off antibiotics or discontinuation of antibiotics and continue supportive care. If the respiratory panel is positive for atypical bacterial infection (Bordetella pertussis, Chlamydophila pneumoniae, or Mycoplasma pneumoniae), consider antibiotic de-escalation to target atypical bacterial infection.             XR Chest 2 View    Result Date: 11/4/2024  XR CHEST 2 VW Date of Exam: 11/4/2024 11:16 AM EST Indication: pna? Comparison: None available. Findings: Heart mediastinum and pulmonary vasculature appear within normal limits. There is generalized pattern of coarsened and indistinct pulmonary interstitial markings and peribronchial thickening, suggesting bronchial inflammation. These changes are little denser in the left midlung than elsewhere, but appear to be diffuse on both images. No lung consolidation, effusion or pneumothorax is appreciated. A few calcified granulomas are noted.     Impression: Impression: Peribronchial thickening which may be chronic, as from asthma, or acute as from bronchitis or early bronchopneumonia. Changes  are little greater in the left perihilar region than elsewhere. These correlate with patient's symptoms. Electronically Signed: Jaret Farooq MD  11/4/2024 1:08 PM EST  Workstation ID: [id number]      ECG 12 Lead ED Triage Standing Order; Syncope   Preliminary Result   Test Reason : ED Triage Standing Order~   Blood Pressure :   */*   mmHG   Vent. Rate :  64 BPM     Atrial Rate :  64 BPM      P-R Int : 130 ms          QRS Dur : 104 ms       QT Int : 398 ms       P-R-T Axes :  47  32  21 degrees      QTc Int : 410 ms      Normal sinus rhythm   Normal ECG   No previous ECGs available      Referred By:            Confirmed By:       Telemetry Scan   Final Result          Procedures    MDM      Initial impression of presenting illness and DDX: Adult male 2 episodes of cough related syncope.  Neurologically intact on arrival he is afebrile.  All the symptoms were preceded by infectious symptoms.  Very low suspicion for cardiac etiology.  He has not had any sudden onset headache to suggest a subarachnoid hemorrhage as a cause of his syncope.  Very low suspicion for VTE-PERC negative    differential diagnosis includes but no limited to the following: []    initial plan and/or treatments: []     diagnostic plan was ordered and interpreted by CAMPBELL Allen MD.    ED Course as of 11/04/24 1326   Mon Nov 04, 2024   1047 An EKG was ordered, reviewed, and interpreted by myself:  Rate: 64.    Rhythm: Sinus    QTc: 410    ST elevation: n. STEMI: N         [DICK]   1203 Respiratory Panel PCR w/COVID-19(SARS-CoV-2) NATANAEL/YAA/LUCIE/PAD/COR/JOSE In-House, NP Swab in UTM/VTM, 2 HR TAT - Swab, Nasopharynx [DICK]   1203 Procalcitonin [DICK]   1203 Magnesium [DICK]   1203 Comprehensive Metabolic Panel(!) [DICK]   1203 Single High Sensitivity Troponin T [DICK]   1203 CBC & Differential  Respiratory PCR is negative.  Procalcitonin is low.  Magnesium is nonactionable.  CMP is nonactionable.  No significant electrolyte abnormality.  Troponin is negative.  CBC  demonstrates no significant leukocytosis or anemia [DICK]   1216 XR Chest 2 View [DICK]   1322 Chest x-ray shows some peribronchial thickening.  Given the duration of his symptoms I suspect this is an early bronchopneumonia.  It is worse on the left than in other areas.  He is already taking azithromycin.  I think he would benefit from additional pneumonia treatment-will prescribe Augmentin.  Will prescribe albuterol inhaler.  Patient was started on Medrol Dosepak.  He will follow with primary care.  Discussed results with him and his wife he felt much better after the lidocaine nebulizer treatment..     Throughout the course of this patient's care, I had a discussion with the patient/family regarding diagnosis, diagnostic results, my clinical treatment plan, medications, and disposition.  At the conclusion of this encounter, I spent any necessary time at the bedside to explain the aftercare instructions, provide patient education, review the results of the evaluation/clinical findings, and my decision making to assure that the patient/family understand the plan of care.   Emphasis was placed on timely follow-up after discharge. I also discussed the potential for the development of a change in clinical condition resulting in an acute emergent condition requiring further evaluation, reconsideration of the current therapeutic plan, admission, or surgical intervention. I discussed that the data examined from today's encounter did not indicate the need for further workup, admission or the presence of an unstable medical condition.  I encouraged the patient to return to the emergency department immediately for ANY concerns, worsening, new complaints, or if symptoms persist and unable to seek follow-up in a timely fashion.  The patient/family expressed understanding and agreement with this plan.     [] [DICK]      ED Course User Index  [DICK] Shay Allen MD        Medications   sodium chloride 0.9 % flush 10 mL (has no  administration in time range)   lidocaine HCl (PF) (XYLOCAINE) 4 % nebulizer solution 4 mL (4 mL Nebulization Given 11/4/24 1053)       HEART SCORE   No data recorded           -----  ED Disposition       ED Disposition   Discharge    Condition   Stable    Comment   --             Final diagnoses:   Bronchopneumonia      Your Follow-Up Providers       Hilda Beckett PA.    Specialties: Family Medicine, Physician Assistant  [address] [facility]  [address]  [facility] [address] [phone]               Go to  [facility] EMERGENCY DEPARTMENT [place].    Specialty: Emergency Medicine  Follow up details: If symptoms worsen  [address] [facility] [address]  [facility] [address] [phone]                     Contact information for after-discharge care    Follow-up information has not been specified.                    Your medication list        START taking these medications        Instructions Last Dose Given Next Dose Due   albuterol sulfate  (90 Base) MCG/ACT inhaler  Commonly known as: PROVENTIL HFA;VENTOLIN HFA;PROAIR HFA      Inhale 2 puffs Every 4 (Four) Hours As Needed for Wheezing.       amoxicillin-clavulanate 875-125 MG per tablet  Commonly known as: AUGMENTIN      Take 1 tablet by mouth 2 (Two) Times a Day for 5 days.              CONTINUE taking these medications        Instructions Last Dose Given Next Dose Due   azithromycin 250 MG tablet  Commonly known as: Zithromax Z-Chalo      Take 2 tablets by mouth on day 1, then 1 tablet daily on days 2-5       methylPREDNISolone 4 MG dose pack  Commonly known as: MEDROL      Take as directed on package instructions.       omeprazole 20 MG capsule  Commonly known as: priLOSEC      Take 1 capsule by mouth Daily.       promethazine-dextromethorphan 6.25-15 MG/5ML syrup  Commonly known as: PROMETHAZINE-DM      Take 5 mL by mouth 4 (Four) Times a Day As Needed for Cough for up to 5 days.                 Where to Get Your Medications         These medications were sent to [facility] [address], [address], [place] [address]      Hours: Monday to Friday 9 AM to 5:30 PM Phone: [phone]   albuterol sulfate  (90 Base) MCG/ACT inhaler  amoxicillin-clavulanate 875-125 MG per tablet

## 2024-11-08 ENCOUNTER — APPOINTMENT (OUTPATIENT)
Facility: HOSPITAL | Age: 49
End: 2024-11-08
Payer: COMMERCIAL

## 2024-11-08 ENCOUNTER — HOSPITAL ENCOUNTER (OUTPATIENT)
Facility: HOSPITAL | Age: 49
Setting detail: OBSERVATION
Discharge: HOME | End: 2024-11-12
Attending: EMERGENCY MEDICINE | Admitting: INTERNAL MEDICINE
Payer: COMMERCIAL

## 2024-11-08 DIAGNOSIS — R55 SYNCOPE AND COLLAPSE: Primary | ICD-10-CM

## 2024-11-08 DIAGNOSIS — K21.9 GASTROESOPHAGEAL REFLUX DISEASE, UNSPECIFIED WHETHER ESOPHAGITIS PRESENT: ICD-10-CM

## 2024-11-08 DIAGNOSIS — J38.3 VOCAL CORD DYSFUNCTION: ICD-10-CM

## 2024-11-08 DIAGNOSIS — G47.33 OSA (OBSTRUCTIVE SLEEP APNEA): ICD-10-CM

## 2024-11-08 PROBLEM — R41.3 TRANSIENT AMNESIA: Status: ACTIVE | Noted: 2024-11-08

## 2024-11-08 LAB
ALBUMIN SERPL-MCNC: 4.3 G/DL (ref 3.5–5.2)
ALBUMIN/GLOB SERPL: 1.7 G/DL
ALP SERPL-CCNC: 79 U/L (ref 39–117)
ALT SERPL W P-5'-P-CCNC: 41 U/L (ref 1–41)
ANION GAP SERPL CALCULATED.3IONS-SCNC: 6.7 MMOL/L (ref 5–15)
ARTERIAL PATENCY WRIST A: ABNORMAL
ASCENDING AORTA: 2.9 CM
AST SERPL-CCNC: 32 U/L (ref 1–40)
ATMOSPHERIC PRESS: ABNORMAL MM[HG]
B PARAPERT DNA SPEC QL NAA+PROBE: NOT DETECTED
B PERT DNA SPEC QL NAA+PROBE: NOT DETECTED
BASE EXCESS BLDA CALC-SCNC: 0.6 MMOL/L (ref 0–2)
BASOPHILS # BLD AUTO: 0.04 10*3/MM3 (ref 0–0.2)
BASOPHILS NFR BLD AUTO: 0.6 % (ref 0–1.5)
BDY SITE: ABNORMAL
BH CV ECHO MEAS - AO MAX PG: 7.3 MMHG
BH CV ECHO MEAS - AO MEAN PG: 4 MMHG
BH CV ECHO MEAS - AO ROOT DIAM: 2.9 CM
BH CV ECHO MEAS - AO V2 MAX: 135 CM/SEC
BH CV ECHO MEAS - AO V2 VTI: 26.1 CM
BH CV ECHO MEAS - AVA(I,D): 3 CM2
BH CV ECHO MEAS - EDV(CUBED): 157.5 ML
BH CV ECHO MEAS - EDV(MOD-SP2): 96.4 ML
BH CV ECHO MEAS - EDV(MOD-SP4): 82 ML
BH CV ECHO MEAS - EF(MOD-BP): 63.7 %
BH CV ECHO MEAS - EF(MOD-SP2): 61 %
BH CV ECHO MEAS - EF(MOD-SP4): 64.9 %
BH CV ECHO MEAS - EF_3D-VOL: 63 %
BH CV ECHO MEAS - ESV(CUBED): 39.3 ML
BH CV ECHO MEAS - ESV(MOD-SP2): 37.6 ML
BH CV ECHO MEAS - ESV(MOD-SP4): 28.8 ML
BH CV ECHO MEAS - FS: 37 %
BH CV ECHO MEAS - IVS/LVPW: 1 CM
BH CV ECHO MEAS - IVSD: 0.9 CM
BH CV ECHO MEAS - LA DIMENSION: 3.7 CM
BH CV ECHO MEAS - LAT PEAK E' VEL: 11.5 CM/SEC
BH CV ECHO MEAS - LV DIASTOLIC VOL/BSA (35-75): 35.7 CM2
BH CV ECHO MEAS - LV MASS(C)D: 180.1 GRAMS
BH CV ECHO MEAS - LV MAX PG: 6.7 MMHG
BH CV ECHO MEAS - LV MEAN PG: 3 MMHG
BH CV ECHO MEAS - LV SYSTOLIC VOL/BSA (12-30): 12.5 CM2
BH CV ECHO MEAS - LV V1 MAX: 129 CM/SEC
BH CV ECHO MEAS - LV V1 VTI: 22.7 CM
BH CV ECHO MEAS - LVIDD: 5.4 CM
BH CV ECHO MEAS - LVIDS: 3.4 CM
BH CV ECHO MEAS - LVOT AREA: 3.5 CM2
BH CV ECHO MEAS - LVOT DIAM: 2.1 CM
BH CV ECHO MEAS - LVPWD: 0.9 CM
BH CV ECHO MEAS - MED PEAK E' VEL: 8.4 CM/SEC
BH CV ECHO MEAS - MV A MAX VEL: 69.2 CM/SEC
BH CV ECHO MEAS - MV DEC SLOPE: 305 CM/SEC2
BH CV ECHO MEAS - MV DEC TIME: 0.23 SEC
BH CV ECHO MEAS - MV E MAX VEL: 70.7 CM/SEC
BH CV ECHO MEAS - MV E/A: 1.02
BH CV ECHO MEAS - MV MAX PG: 2.6 MMHG
BH CV ECHO MEAS - MV MEAN PG: 1 MMHG
BH CV ECHO MEAS - MV P1/2T: 74.5 MSEC
BH CV ECHO MEAS - MV V2 VTI: 23.1 CM
BH CV ECHO MEAS - MVA(P1/2T): 3 CM2
BH CV ECHO MEAS - MVA(VTI): 3.4 CM2
BH CV ECHO MEAS - PA ACC TIME: 0.09 SEC
BH CV ECHO MEAS - PA V2 MAX: 115 CM/SEC
BH CV ECHO MEAS - PI END-D VEL: 84.1 CM/SEC
BH CV ECHO MEAS - RAP SYSTOLE: 3 MMHG
BH CV ECHO MEAS - SV(LVOT): 78.6 ML
BH CV ECHO MEAS - SV(MOD-SP2): 58.8 ML
BH CV ECHO MEAS - SV(MOD-SP4): 53.2 ML
BH CV ECHO MEAS - SVI(LVOT): 34.2 ML/M2
BH CV ECHO MEAS - SVI(MOD-SP2): 25.6 ML/M2
BH CV ECHO MEAS - SVI(MOD-SP4): 23.1 ML/M2
BH CV ECHO MEAS - TAPSE (>1.6): 2.8 CM
BH CV ECHO MEASUREMENTS AVERAGE E/E' RATIO: 7.11
BH CV STRESS BP STAGE 1: NORMAL
BH CV STRESS BP STAGE 2: NORMAL
BH CV STRESS BP STAGE 3: NORMAL
BH CV STRESS DURATION MIN STAGE 1: 3
BH CV STRESS DURATION MIN STAGE 2: 3
BH CV STRESS DURATION MIN STAGE 3: 2
BH CV STRESS DURATION SEC STAGE 1: 0
BH CV STRESS DURATION SEC STAGE 2: 0
BH CV STRESS DURATION SEC STAGE 3: 50
BH CV STRESS GRADE STAGE 1: 10
BH CV STRESS GRADE STAGE 2: 12
BH CV STRESS GRADE STAGE 3: 14
BH CV STRESS HR STAGE 1: 98
BH CV STRESS HR STAGE 2: 115
BH CV STRESS HR STAGE 3: 148
BH CV STRESS METS STAGE 1: 5
BH CV STRESS METS STAGE 2: 7.5
BH CV STRESS METS STAGE 3: 10
BH CV STRESS O2 STAGE 1: 96
BH CV STRESS O2 STAGE 2: 96
BH CV STRESS O2 STAGE 3: 97
BH CV STRESS PROTOCOL 1: NORMAL
BH CV STRESS RECOVERY BP: NORMAL MMHG
BH CV STRESS RECOVERY HR: 96 BPM
BH CV STRESS RECOVERY O2: 96 %
BH CV STRESS SPEED STAGE 1: 1.7
BH CV STRESS SPEED STAGE 2: 2.5
BH CV STRESS SPEED STAGE 3: 3.4
BH CV STRESS STAGE 1: 1
BH CV STRESS STAGE 2: 2
BH CV STRESS STAGE 3: 3
BH CV XLRA - RV BASE: 3.3 CM
BH CV XLRA - RV LENGTH: 7.1 CM
BH CV XLRA - RV MID: 2.2 CM
BH CV XLRA - TDI S': 14.9 CM/SEC
BILIRUB SERPL-MCNC: 0.8 MG/DL (ref 0–1.2)
BILIRUB UR QL STRIP: NEGATIVE
BODY TEMPERATURE: 37
BUN SERPL-MCNC: 13 MG/DL (ref 6–20)
BUN/CREAT SERPL: 11.6 (ref 7–25)
C PNEUM DNA NPH QL NAA+NON-PROBE: NOT DETECTED
CALCIUM SPEC-SCNC: 8.9 MG/DL (ref 8.6–10.5)
CHLORIDE SERPL-SCNC: 106 MMOL/L (ref 98–107)
CLARITY UR: CLEAR
CO2 BLDA-SCNC: 26.5 MMOL/L (ref 22–33)
CO2 SERPL-SCNC: 30.3 MMOL/L (ref 22–29)
COHGB MFR BLD: 0.9 % (ref 0–2)
COLOR UR: YELLOW
CREAT SERPL-MCNC: 1.12 MG/DL (ref 0.76–1.27)
CRP SERPL-MCNC: <0.3 MG/DL (ref 0–0.5)
D-LACTATE SERPL-SCNC: 1 MMOL/L (ref 0.5–2)
DEPRECATED RDW RBC AUTO: 42.6 FL (ref 37–54)
EGFRCR SERPLBLD CKD-EPI 2021: 81 ML/MIN/1.73
EOSINOPHIL # BLD AUTO: 0.35 10*3/MM3 (ref 0–0.4)
EOSINOPHIL NFR BLD AUTO: 5 % (ref 0.3–6.2)
EPAP: 0
ERYTHROCYTE [DISTWIDTH] IN BLOOD BY AUTOMATED COUNT: 12.6 % (ref 12.3–15.4)
FLUAV SUBTYP SPEC NAA+PROBE: NOT DETECTED
FLUBV RNA ISLT QL NAA+PROBE: NOT DETECTED
GEN 5 2HR TROPONIN T REFLEX: 9 NG/L
GLOBULIN UR ELPH-MCNC: 2.5 GM/DL
GLUCOSE SERPL-MCNC: 103 MG/DL (ref 65–99)
GLUCOSE UR STRIP-MCNC: NEGATIVE MG/DL
HADV DNA SPEC NAA+PROBE: NOT DETECTED
HCO3 BLDA-SCNC: 25.3 MMOL/L (ref 20–26)
HCOV 229E RNA SPEC QL NAA+PROBE: NOT DETECTED
HCOV HKU1 RNA SPEC QL NAA+PROBE: NOT DETECTED
HCOV NL63 RNA SPEC QL NAA+PROBE: NOT DETECTED
HCOV OC43 RNA SPEC QL NAA+PROBE: NOT DETECTED
HCT VFR BLD AUTO: 51.5 % (ref 37.5–51)
HCT VFR BLD CALC: 55.2 %
HGB BLD-MCNC: 18 G/DL (ref 13–17.7)
HGB BLDA-MCNC: 18 G/DL (ref 14–18)
HGB UR QL STRIP.AUTO: NEGATIVE
HMPV RNA NPH QL NAA+NON-PROBE: NOT DETECTED
HOLD SPECIMEN: NORMAL
HPIV1 RNA ISLT QL NAA+PROBE: NOT DETECTED
HPIV2 RNA SPEC QL NAA+PROBE: NOT DETECTED
HPIV3 RNA NPH QL NAA+PROBE: NOT DETECTED
HPIV4 P GENE NPH QL NAA+PROBE: NOT DETECTED
IMM GRANULOCYTES # BLD AUTO: 0.04 10*3/MM3 (ref 0–0.05)
IMM GRANULOCYTES NFR BLD AUTO: 0.6 % (ref 0–0.5)
INHALED O2 CONCENTRATION: 21 %
IPAP: 0
KETONES UR QL STRIP: NEGATIVE
LEFT ATRIUM VOLUME INDEX: 21 ML/M2
LEUKOCYTE ESTERASE UR QL STRIP.AUTO: NEGATIVE
LYMPHOCYTES # BLD AUTO: 2.54 10*3/MM3 (ref 0.7–3.1)
LYMPHOCYTES NFR BLD AUTO: 36.1 % (ref 19.6–45.3)
M PNEUMO IGG SER IA-ACNC: NOT DETECTED
MAGNESIUM SERPL-MCNC: 2.2 MG/DL (ref 1.6–2.6)
MAXIMAL PREDICTED HEART RATE: 172 BPM
MCH RBC QN AUTO: 32 PG (ref 26.6–33)
MCHC RBC AUTO-ENTMCNC: 35 G/DL (ref 31.5–35.7)
MCV RBC AUTO: 91.5 FL (ref 79–97)
METHGB BLD QL: 0 % (ref 0–1.5)
MODALITY: ABNORMAL
MONOCYTES # BLD AUTO: 0.53 10*3/MM3 (ref 0.1–0.9)
MONOCYTES NFR BLD AUTO: 7.5 % (ref 5–12)
NEUTROPHILS NFR BLD AUTO: 3.54 10*3/MM3 (ref 1.7–7)
NEUTROPHILS NFR BLD AUTO: 50.2 % (ref 42.7–76)
NITRITE UR QL STRIP: NEGATIVE
NT-PROBNP SERPL-MCNC: <36 PG/ML (ref 0–450)
OXYHGB MFR BLDV: 91.5 % (ref 94–99)
PAW @ PEAK INSP FLOW SETTING VENT: 0 CMH2O
PCO2 BLDA: 40.1 MM HG (ref 35–45)
PCO2 TEMP ADJ BLD: 40.1 MM HG (ref 35–48)
PERCENT MAX PREDICTED HR: 87.79 %
PH BLDA: 7.41 PH UNITS (ref 7.35–7.45)
PH UR STRIP.AUTO: 6.5 [PH] (ref 5–8)
PH, TEMP CORRECTED: 7.41 PH UNITS
PLATELET # BLD AUTO: 253 10*3/MM3 (ref 140–450)
PMV BLD AUTO: 9.4 FL (ref 6–12)
PO2 BLDA: 62.9 MM HG (ref 83–108)
PO2 TEMP ADJ BLD: 62.9 MM HG (ref 83–108)
POTASSIUM SERPL-SCNC: 4.2 MMOL/L (ref 3.5–5.2)
PROT SERPL-MCNC: 6.8 G/DL (ref 6–8.5)
PROT UR QL STRIP: NEGATIVE
RBC # BLD AUTO: 5.63 10*6/MM3 (ref 4.14–5.8)
RHINOVIRUS RNA SPEC NAA+PROBE: NOT DETECTED
RSV RNA NPH QL NAA+NON-PROBE: NOT DETECTED
SARS-COV-2 RNA RESP QL NAA+PROBE: NOT DETECTED
SODIUM SERPL-SCNC: 143 MMOL/L (ref 136–145)
SP GR UR STRIP: <=1.005 (ref 1–1.03)
STRESS BASELINE BP: NORMAL MMHG
STRESS BASELINE HR: 67 BPM
STRESS O2 SAT REST: 97 %
STRESS PERCENT HR: 103 %
STRESS POST ESTIMATED WORKLOAD: 10.1 METS
STRESS POST EXERCISE DUR MIN: 8 MIN
STRESS POST EXERCISE DUR SEC: 50 SEC
STRESS POST O2 SAT PEAK: 96 %
STRESS POST PEAK BP: NORMAL MMHG
STRESS POST PEAK HR: 151 BPM
STRESS TARGET HR: 146 BPM
TOTAL RATE: 0 BREATHS/MINUTE
TROPONIN T DELTA: 1 NG/L
TROPONIN T SERPL HS-MCNC: 10 NG/L
TROPONIN T SERPL HS-MCNC: 8 NG/L
UROBILINOGEN UR QL STRIP: NORMAL
WBC NRBC COR # BLD AUTO: 7.04 10*3/MM3 (ref 3.4–10.8)
WHOLE BLOOD HOLD COAG: NORMAL
WHOLE BLOOD HOLD SPECIMEN: NORMAL

## 2024-11-08 PROCEDURE — 93306 TTE W/DOPPLER COMPLETE: CPT

## 2024-11-08 PROCEDURE — 84484 ASSAY OF TROPONIN QUANT: CPT | Performed by: PHYSICIAN ASSISTANT

## 2024-11-08 PROCEDURE — 86140 C-REACTIVE PROTEIN: CPT | Performed by: EMERGENCY MEDICINE

## 2024-11-08 PROCEDURE — 36600 WITHDRAWAL OF ARTERIAL BLOOD: CPT

## 2024-11-08 PROCEDURE — 81003 URINALYSIS AUTO W/O SCOPE: CPT | Performed by: PHYSICIAN ASSISTANT

## 2024-11-08 PROCEDURE — 82375 ASSAY CARBOXYHB QUANT: CPT

## 2024-11-08 PROCEDURE — 93005 ELECTROCARDIOGRAM TRACING: CPT | Performed by: EMERGENCY MEDICINE

## 2024-11-08 PROCEDURE — 84484 ASSAY OF TROPONIN QUANT: CPT | Performed by: EMERGENCY MEDICINE

## 2024-11-08 PROCEDURE — 80053 COMPREHEN METABOLIC PANEL: CPT | Performed by: EMERGENCY MEDICINE

## 2024-11-08 PROCEDURE — 83880 ASSAY OF NATRIURETIC PEPTIDE: CPT | Performed by: EMERGENCY MEDICINE

## 2024-11-08 PROCEDURE — 99222 1ST HOSP IP/OBS MODERATE 55: CPT | Performed by: FAMILY MEDICINE

## 2024-11-08 PROCEDURE — G0378 HOSPITAL OBSERVATION PER HR: HCPCS

## 2024-11-08 PROCEDURE — 25510000001 IOPAMIDOL PER 1 ML: Performed by: EMERGENCY MEDICINE

## 2024-11-08 PROCEDURE — 83605 ASSAY OF LACTIC ACID: CPT | Performed by: EMERGENCY MEDICINE

## 2024-11-08 PROCEDURE — 71275 CT ANGIOGRAPHY CHEST: CPT

## 2024-11-08 PROCEDURE — 85025 COMPLETE CBC W/AUTO DIFF WBC: CPT | Performed by: EMERGENCY MEDICINE

## 2024-11-08 PROCEDURE — 71045 X-RAY EXAM CHEST 1 VIEW: CPT

## 2024-11-08 PROCEDURE — 83050 HGB METHEMOGLOBIN QUAN: CPT

## 2024-11-08 PROCEDURE — 25810000003 SODIUM CHLORIDE 0.9 % SOLUTION: Performed by: EMERGENCY MEDICINE

## 2024-11-08 PROCEDURE — 70450 CT HEAD/BRAIN W/O DYE: CPT

## 2024-11-08 PROCEDURE — 70496 CT ANGIOGRAPHY HEAD: CPT

## 2024-11-08 PROCEDURE — 93005 ELECTROCARDIOGRAM TRACING: CPT

## 2024-11-08 PROCEDURE — 93018 CV STRESS TEST I&R ONLY: CPT | Performed by: INTERNAL MEDICINE

## 2024-11-08 PROCEDURE — 93306 TTE W/DOPPLER COMPLETE: CPT | Performed by: INTERNAL MEDICINE

## 2024-11-08 PROCEDURE — 99285 EMERGENCY DEPT VISIT HI MDM: CPT

## 2024-11-08 PROCEDURE — 83735 ASSAY OF MAGNESIUM: CPT | Performed by: PHYSICIAN ASSISTANT

## 2024-11-08 PROCEDURE — 82805 BLOOD GASES W/O2 SATURATION: CPT

## 2024-11-08 PROCEDURE — 70498 CT ANGIOGRAPHY NECK: CPT

## 2024-11-08 PROCEDURE — 93017 CV STRESS TEST TRACING ONLY: CPT

## 2024-11-08 PROCEDURE — 70551 MRI BRAIN STEM W/O DYE: CPT

## 2024-11-08 PROCEDURE — 0202U NFCT DS 22 TRGT SARS-COV-2: CPT | Performed by: PHYSICIAN ASSISTANT

## 2024-11-08 RX ORDER — IBUPROFEN 600 MG/1
600 TABLET, FILM COATED ORAL ONCE
Status: COMPLETED | OUTPATIENT
Start: 2024-11-09 | End: 2024-11-08

## 2024-11-08 RX ORDER — ACETAMINOPHEN 325 MG/1
650 TABLET ORAL EVERY 6 HOURS PRN
Status: DISCONTINUED | OUTPATIENT
Start: 2024-11-08 | End: 2024-11-12 | Stop reason: HOSPADM

## 2024-11-08 RX ORDER — PANTOPRAZOLE SODIUM 40 MG/1
40 TABLET, DELAYED RELEASE ORAL
Status: DISCONTINUED | OUTPATIENT
Start: 2024-11-09 | End: 2024-11-12

## 2024-11-08 RX ORDER — SODIUM CHLORIDE 0.9 % (FLUSH) 0.9 %
10 SYRINGE (ML) INJECTION AS NEEDED
Status: DISCONTINUED | OUTPATIENT
Start: 2024-11-08 | End: 2024-11-12 | Stop reason: HOSPADM

## 2024-11-08 RX ORDER — AZITHROMYCIN 250 MG/1
250 TABLET, FILM COATED ORAL
Status: CANCELLED | OUTPATIENT
Start: 2024-11-08 | End: 2024-11-09

## 2024-11-08 RX ORDER — NICOTINE 21 MG/24HR
1 PATCH, TRANSDERMAL 24 HOURS TRANSDERMAL EVERY 24 HOURS
Status: DISCONTINUED | OUTPATIENT
Start: 2024-11-08 | End: 2024-11-12 | Stop reason: HOSPADM

## 2024-11-08 RX ORDER — IOPAMIDOL 755 MG/ML
100 INJECTION, SOLUTION INTRAVASCULAR
Status: COMPLETED | OUTPATIENT
Start: 2024-11-08 | End: 2024-11-08

## 2024-11-08 RX ORDER — SODIUM CHLORIDE 0.9 % (FLUSH) 0.9 %
10 SYRINGE (ML) INJECTION EVERY 12 HOURS SCHEDULED
Status: DISCONTINUED | OUTPATIENT
Start: 2024-11-08 | End: 2024-11-12 | Stop reason: HOSPADM

## 2024-11-08 RX ORDER — SODIUM CHLORIDE 9 MG/ML
40 INJECTION, SOLUTION INTRAVENOUS AS NEEDED
Status: DISCONTINUED | OUTPATIENT
Start: 2024-11-08 | End: 2024-11-12 | Stop reason: HOSPADM

## 2024-11-08 RX ORDER — ALBUTEROL SULFATE 90 UG/1
2 INHALANT RESPIRATORY (INHALATION) EVERY 4 HOURS PRN
Status: CANCELLED | OUTPATIENT
Start: 2024-11-08

## 2024-11-08 RX ADMIN — ACETAMINOPHEN 650 MG: 325 TABLET ORAL at 23:37

## 2024-11-08 RX ADMIN — IOPAMIDOL 85 ML: 755 INJECTION, SOLUTION INTRAVENOUS at 11:21

## 2024-11-08 RX ADMIN — IBUPROFEN 600 MG: 600 TABLET, FILM COATED ORAL at 23:37

## 2024-11-08 RX ADMIN — IOPAMIDOL 75 ML: 755 INJECTION, SOLUTION INTRAVENOUS at 13:07

## 2024-11-08 RX ADMIN — AMOXICILLIN AND CLAVULANATE POTASSIUM 1 TABLET: 875; 125 TABLET, FILM COATED ORAL at 19:25

## 2024-11-08 RX ADMIN — SODIUM CHLORIDE 1000 ML: 9 INJECTION, SOLUTION INTRAVENOUS at 11:25

## 2024-11-08 NOTE — [facility] PROVIDER NOTE
Subjective  History of Present Illness:    Patient returns emergency department after having another episode of syncope.  His wife, a nurse in our ER, reports that she witnessed him sit up from his recliner and have a gasp of air before passing out.  He was out for approximately 45 seconds.  He was cyanotic/purple.  Did not lose a pulse.  He recovered spontaneously.    I saw this patient in the emergency department earlier this week.  He was having reflex/cough related syncope.  He was discharged home on azithromycin, Augmentin, Medrol Dosepak and albuterol inhaler.  Patient states he continued to cough.  He had several episodes of coughing and syncope.  He felt that in the last 24 hours he had improved.  His cough is better.  He rested well last night..  No fevers.  He has finished a steroid.    He has no other diagnosed medical problems.  Does not take chronic medication daily.  No prior history of syncope outside of this acute illness.  He has not had any chest pain abdominal pain or bleeding associated with these events.    Nurses Notes reviewed and agree, including vitals, allergies, social history and prior medical history.     REVIEW OF SYSTEMS: All systems reviewed and not pertinent unless noted.    Past Medical History:   Diagnosis Date    Heart burn        Allergies:    Patient has no known allergies.      History reviewed. No pertinent surgical history.      Social History     Socioeconomic History    Marital status:    Tobacco Use    Smoking status: Some Days     Current packs/day: 1.00     Types: Cigarettes    Smokeless tobacco: Never    Tobacco comments:     1 pack per week   Vaping Use    Vaping status: Never Used   Substance and Sexual Activity    Alcohol use: Yes     Comment: maybe one drink week    Drug use: Never    Sexual activity: Defer         Family History   Problem Relation Age of Onset    Hypertension Mother     Obesity Mother     Arthritis Mother        Objective  Physical Exam:  BP  "131/78 (BP Location: Left arm, Patient Position: Lying)   Pulse 71   Temp 98.4 °F (36.9 °C) (Oral)   Resp 22   Ht 180.3 cm (70.98\")   Wt 111 kg (244 lb 11.4 oz)   SpO2 94%   BMI 34.15 kg/m²      [Primary Survey    Airway: Patent and protected  Breathing: Symmetric bilaterally  Circulation: Mentating well, responsive        Constitutional: Nontoxic appearance.  Psychological: No abnormalities of mood affect.  Head: Atraumatic  Eyes: Conjunctiva are non-injected. no scleral icterus.  ENT: No obvious congestion or obstruction noted  Neck: No obvious deformity.  ROM appears preserved  Chest: No deformity noted.  No paradoxical breathing noted  Respiratory: Respiratory effort was normal - no use of accessory respiratory muscles noted.  There is no stridor.  Cardiovascular: Perfusion appears preserved - mentating well RRR no murmurs  Gastrointestinal: Abdomen nondistended.  Genitourinary: Not examined  Lymphatic: Not examined  Back: Not examined  Musculoskeletal: Musculoskeletal system is grossly intact.  There is no obvious deformity.  Neurological: Face: No Asymmetry.  Gross motor movement is intact in all 4 extremities.   Patient exhibits normal speech.  Skin: No Pallor no obvious bruising.  No obvious rash.]      ED Course:    Lab Results (last 24 hours)       Procedure Component Value Units Date/Time    CBC & Differential [[id number]]  (Abnormal) Collected: 11/08/24 1034    Specimen: Blood Updated: 11/08/24 1048    Narrative:      The following orders were created for panel order CBC & Differential.  Procedure                               Abnormality         Status                     ---------                               -----------         ------                     CBC Auto Differential[[id number]]        Abnormal            Final result                 Please view results for these tests on the individual orders.    Comprehensive Metabolic Panel [[id number]]  (Abnormal) Collected: 11/08/24 1034    " Specimen: Blood Updated: 11/08/24 1103     Glucose 103 mg/dL      BUN 13 mg/dL      Creatinine 1.12 mg/dL      Sodium 143 mmol/L      Potassium 4.2 mmol/L      Chloride 106 mmol/L      CO2 30.3 mmol/L      Calcium 8.9 mg/dL      Total Protein 6.8 g/dL      Albumin 4.3 g/dL      ALT (SGPT) 41 U/L      AST (SGOT) 32 U/L      Alkaline Phosphatase 79 U/L      Total Bilirubin 0.8 mg/dL      Globulin 2.5 gm/dL      A/G Ratio 1.7 g/dL      BUN/Creatinine Ratio 11.6     Anion Gap 6.7 mmol/L      eGFR 81.0 mL/min/1.73     Narrative:      GFR Normal >60  Chronic Kidney Disease <60  Kidney Failure <15      BNP [[id number]]  (Normal) Collected: 11/08/24 1034    Specimen: Blood Updated: 11/08/24 1100     proBNP <36.0 pg/mL     Narrative:      This assay is used as an aid in the diagnosis of individuals suspected of having heart failure. It can be used as an aid in the diagnosis of acute decompensated heart failure (ADHF) in patients presenting with signs and symptoms of ADHF to the emergency department (ED). In addition, NT-proBNP of <300 pg/mL indicates ADHF is not likely.    Age Range Result Interpretation  NT-proBNP Concentration (pg/mL:      <50             Positive            >450                   Gray                 300-450                    Negative             <300    50-75           Positive            >900                  Gray                300-900                  Negative            <300      >75             Positive            >1800                  Gray                300-1800                  Negative            <300    Single High Sensitivity Troponin T [[id number]]  (Normal) Collected: 11/08/24 1034    Specimen: Blood Updated: 11/08/24 1059     HS Troponin T 10 ng/L     CBC Auto Differential [[id number]]  (Abnormal) Collected: 11/08/24 1034    Specimen: Blood Updated: 11/08/24 1048     WBC 7.04 10*3/mm3      RBC 5.63 10*6/mm3      Hemoglobin 18.0 g/dL      Hematocrit 51.5 %      MCV 91.5 fL      MCH 32.0 pg       MCHC 35.0 g/dL      RDW 12.6 %      RDW-SD 42.6 fl      MPV 9.4 fL      Platelets 253 10*3/mm3      Neutrophil % 50.2 %      Lymphocyte % 36.1 %      Monocyte % 7.5 %      Eosinophil % 5.0 %      Basophil % 0.6 %      Immature Grans % 0.6 %      Neutrophils, Absolute 3.54 10*3/mm3      Lymphocytes, Absolute 2.54 10*3/mm3      Monocytes, Absolute 0.53 10*3/mm3      Eosinophils, Absolute 0.35 10*3/mm3      Basophils, Absolute 0.04 10*3/mm3      Immature Grans, Absolute 0.04 10*3/mm3     C-reactive Protein [[id number]]  (Normal) Collected: 11/08/24 1034    Specimen: Blood Updated: 11/08/24 1102     C-Reactive Protein <0.30 mg/dL     Lactic Acid, Plasma [[id number]]  (Normal) Collected: 11/08/24 1034    Specimen: Blood Updated: 11/08/24 1104     Lactate 1.0 mmol/L     Magnesium [[id number]]  (Normal) Collected: 11/08/24 1034    Specimen: Blood Updated: 11/08/24 1251     Magnesium 2.2 mg/dL     Respiratory Panel PCR w/COVID-19(SARS-CoV-2) NATANAEL/YAA/LUCIE/PAD/COR/JOSE In-House, NP Swab in UTM/VTM, 2 HR TAT - Swab, Nasopharynx [[id number]]  (Normal) Collected: 11/08/24 1436    Specimen: Swab from Nasopharynx Updated: 11/08/24 1532     ADENOVIRUS, PCR Not Detected     Coronavirus 229E Not Detected     Coronavirus HKU1 Not Detected     Coronavirus NL63 Not Detected     Coronavirus OC43 Not Detected     COVID19 Not Detected     Human Metapneumovirus Not Detected     Human Rhinovirus/Enterovirus Not Detected     Influenza A PCR Not Detected     Influenza B PCR Not Detected     Parainfluenza Virus 1 Not Detected     Parainfluenza Virus 2 Not Detected     Parainfluenza Virus 3 Not Detected     Parainfluenza Virus 4 Not Detected     RSV, PCR Not Detected     Bordetella pertussis pcr Not Detected     Bordetella parapertussis PCR Not Detected     Chlamydophila pneumoniae PCR Not Detected     Mycoplasma pneumo by PCR Not Detected    Narrative:      In the setting of a positive respiratory panel with a viral infection PLUS a negative  procalcitonin without other underlying concern for bacterial infection, consider observing off antibiotics or discontinuation of antibiotics and continue supportive care. If the respiratory panel is positive for atypical bacterial infection (Bordetella pertussis, Chlamydophila pneumoniae, or Mycoplasma pneumoniae), consider antibiotic de-escalation to target atypical bacterial infection.    High Sensitivity Troponin T [[id number]]  (Normal) Collected: 11/08/24 1436    Specimen: Blood from Arm, Left Updated: 11/08/24 1514     HS Troponin T 8 ng/L     Blood Gas, Arterial With Co-Ox [[id number]]  (Abnormal) Collected: 11/08/24 1519    Specimen: Arterial Blood Updated: 11/08/24 1520     Site Right Radial     Grady's Test N/A     pH, Arterial 7.408 pH units      pCO2, Arterial 40.1 mm Hg      pO2, Arterial 62.9 mm Hg      Comment: 84 Value below reference range        HCO3, Arterial 25.3 mmol/L      Base Excess, Arterial 0.6 mmol/L      Hemoglobin, Blood Gas 18.0 g/dL      Comment: 83 Value above reference range        Hematocrit, Blood Gas 55.2 %      Oxyhemoglobin 91.5 %      Comment: 84 Value below reference range        Methemoglobin 0.00 %      Carboxyhemoglobin 0.9 %      CO2 Content 26.5 mmol/L      Temperature 37.0     Barometric Pressure for Blood Gas --     Comment: N/A        Modality Room Air     FIO2 21 %      Rate 0 Breaths/minute      PIP 0 cmH2O      Comment: Meter: [id number]     :  [id number]        IPAP 0     EPAP 0     pH, Temp Corrected 7.408 pH Units      pCO2, Temperature Corrected 40.1 mm Hg      pO2, Temperature Corrected 62.9 mm Hg     Urinalysis With Microscopic If Indicated (No Culture) - Urine, Clean Catch [[id number]]  (Normal) Collected: 11/08/24 1555    Specimen: Urine, Clean Catch Updated: 11/08/24 1613     Color, UA Yellow     Appearance, UA Clear     pH, UA 6.5     Specific Gravity, UA <=1.005     Glucose, UA Negative     Ketones, UA Negative     Bilirubin, UA Negative      Blood, UA Negative     Protein, UA Negative     Leuk Esterase, UA Negative     Nitrite, UA Negative     Urobilinogen, UA 0.2 E.U./dL    Narrative:      Urine microscopic not indicated.             MRI Brain Without Contrast    Result Date: 11/8/2024  MRI BRAIN WO CONTRAST Date of Exam: 11/8/2024 3:13 PM EST Indication: syncope.  Comparison: CT same day Technique:  Routine multiplanar/multisequence sequence images of the brain were obtained without contrast administration. Findings: Diffusion-weighted imaging demonstrates no acute restriction abnormality. Midline structures of the brain are grossly unremarkable in appearance. Pituitary and sella structures and craniocervical junction are grossly unremarkable in their appearance. The ventricles, cisterns and sulci appear within normal limits. No acute intracranial hemorrhage or mass effect is noted. Gray and white matter signal characteristics appear within normal limits. Major intracranial flow voids appear grossly patent. Please see CT angiogram same day for further evaluation. Mild mucosal thickening of the paranasal sinuses noted. Globes and orbits appear grossly unremarkable in their appearance. Mastoid air cells appear to be grossly clear. Cerebral pontine angle structures and inner ear structures appear grossly unremarkable in appearance.     Impression: Impression: No acute intracranial abnormality Electronically Signed: Cristian Richards MD  11/8/2024 3:51 PM EST  Workstation ID: [id number]    Treadmill Stress Test    Result Date: 11/8/2024    Pt denied chest pain/discomfort during exercise. Pt did c/o SOA (SP02 WNL on RA) that resolved in recovery.   THR of 146 achieved at 8:40.   Expected exercise duration = 10:40. Actual exercise duration = 8:50. Pt stopped d/t leg fatigue.   DTS of 15.8.   SR-ST with rare PVC in recovery.   No ST-T wave changes noted.   No ECG evidence of myocardial ischemia.   Negative clinical evidence of myocardial ischemia.   Findings  consistent with a normal ECG stress test.     CT Angiogram Neck    Result Date: 11/8/2024  CT ANGIOGRAM NECK, CT ANGIOGRAM HEAD Date of Exam: 11/8/2024 12:54 PM EST Indication: syncope. Comparison: CT head from earlier today Technique: CTA of the head and neck was performed before and after the uneventful intravenous administration of 75 mL Isovue-370. Reconstructed coronal and sagittal images were also obtained. In addition, a 3-D volume rendered image was created for interpretation. Automated exposure control and iterative reconstruction methods were used. Findings: There is a three-vessel aortic arch. The right common carotid artery is widely patent with mild plaque distally. The right carotid bifurcation is widely patent. The right internal carotid artery is widely patent. The left common carotid artery is widely patent. The left carotid bifurcation is widely patent. The left internal carotid artery is widely patent. The right vertebral artery is widely patent. The left vertebral artery is widely patent. The left vertebral artery is dominant. The bilateral middle cerebral, bilateral anterior cerebral, and anterior communicating arteries are widely patent. The basilar and bilateral posterior cerebral arteries are widely patent. There are patent bilateral posterior communicating arteries. No intracranial aneurysm is identified. The visualized portions of the bilateral superior cerebellar, anteroinferior cerebellar, and posterior inferior cerebellar arteries are unremarkable.     Impression: Impression: Major arterial vasculature within head and neck appears widely patent, with no hemodynamically significant stenosis, dissection, thrombus, or aneurysm. Electronically Signed: Luis Dennis MD  11/8/2024 1:29 PM EST  Workstation ID: [id number]    CT Angiogram Head    Result Date: 11/8/2024  CT ANGIOGRAM NECK, CT ANGIOGRAM HEAD Date of Exam: 11/8/2024 12:54 PM EST Indication: syncope. Comparison: CT head from earlier  today Technique: CTA of the head and neck was performed before and after the uneventful intravenous administration of 75 mL Isovue-370. Reconstructed coronal and sagittal images were also obtained. In addition, a 3-D volume rendered image was created for interpretation. Automated exposure control and iterative reconstruction methods were used. Findings: There is a three-vessel aortic arch. The right common carotid artery is widely patent with mild plaque distally. The right carotid bifurcation is widely patent. The right internal carotid artery is widely patent. The left common carotid artery is widely patent. The left carotid bifurcation is widely patent. The left internal carotid artery is widely patent. The right vertebral artery is widely patent. The left vertebral artery is widely patent. The left vertebral artery is dominant. The bilateral middle cerebral, bilateral anterior cerebral, and anterior communicating arteries are widely patent. The basilar and bilateral posterior cerebral arteries are widely patent. There are patent bilateral posterior communicating arteries. No intracranial aneurysm is identified. The visualized portions of the bilateral superior cerebellar, anteroinferior cerebellar, and posterior inferior cerebellar arteries are unremarkable.     Impression: Impression: Major arterial vasculature within head and neck appears widely patent, with no hemodynamically significant stenosis, dissection, thrombus, or aneurysm. Electronically Signed: Luis Dennis MD  11/8/2024 1:29 PM EST  Workstation ID: [id number]    CT Head Without Contrast    Result Date: 11/8/2024  CT HEAD WO CONTRAST Date of Exam: 11/8/2024 12:54 PM EST Indication: syncope. Comparison: None available. Technique: Axial CT images were obtained of the head without contrast administration.  Automated exposure control and iterative construction methods were used. Findings: No acute intracranial hemorrhage or extra-axial collection is  identified. The ventricles appear normal in caliber, with no evidence of mass effect or midline shift. The basal cisterns appear patent. The gray-white differentiation appears preserved. The calvarium appear intact. The paranasal sinuses are clear. The mastoid air cells are well-aerated.     Impression: Impression: No acute intracranial process identified. Electronically Signed: Luis Dennis MD  11/8/2024 1:18 PM EST  Workstation ID: [id number]    CT Angiogram Chest    Result Date: 11/8/2024  CT ANGIOGRAM CHEST Date of Exam: 11/8/2024 11:15 AM EST Indication: syncope, concern for PE. Comparison: Chest x-ray from today Technique: CTA of the chest was performed after the uneventful intravenous administration of 85 mL Isovue-370. Reconstructed coronal and sagittal images were also obtained. In addition, a 3-D volume rendered image was created for interpretation. Automated exposure control and iterative reconstruction methods were used. FINDINGS: Thoracic inlet: Unremarkable. Pulmonary arteries: No filling defects are identified within the pulmonary arteries to suggest acute pulmonary embolism. Great vessels: The thoracic aorta and proximal arch vessels appear unremarkable. Mediastinum/Katie: No pathologically enlarged mediastinal lymph nodes are seen. The esophagus is unremarkable. Lung parenchyma: Left upper lobe granuloma. Lungs appear otherwise adequately aerated. No acute infiltrate is seen. No suspicious pulmonary nodules are seen. Trachea and airways: The trachea and central airways appear unremarkable. Pleural space: No significant pleural effusion or pneumothorax. Heart and pericardium: The heart and pericardium appear unremarkable. Chest wall: No acute or suspicious osseous or soft tissue lesion is identified. Upper abdomen: No acute abnormality is identified within the visualized upper abdomen. There are several subcentimeter liver hypodensities which are too small to characterize.     Impression: 1.No acute  abnormality is identified within the thorax. Specifically, there is no evidence of acute pulmonary embolism. 2.Please see above for additional details. Electronically Signed: Cas Orozco MD  11/8/2024 11:35 AM EST  Workstation ID: [id number]    XR Chest 1 View    Result Date: 11/8/2024  XR CHEST 1 VW Date of Exam: 11/8/2024 10:54 AM EST Indication: SOA triage protocol Comparison: 11/4/2024 Findings: Low lung volumes. Heart size and pulmonary vasculature are within normal limits. Mild atelectasis and vascular crowding in the lung bases due to low lung volumes. No suspicious infiltrate or edema. Calcified granuloma in the peripheral left midlung. Costophrenic angle sharp     Impression: Impression: Hypoinspiratory film demonstrating no acute cardiopulmonary process Electronically Signed: Ranjith Garcia  11/8/2024 11:08 AM EST  Workstation ID: [id number]      ECG 12 Lead ED Triage Standing Order; SOA   Preliminary Result   Test Reason : ED Triage Standing Order~   Blood Pressure :   */*   mmHG   Vent. Rate :  75 BPM     Atrial Rate :  75 BPM      P-R Int : 132 ms          QRS Dur :  92 ms       QT Int : 364 ms       P-R-T Axes :  38  33  14 degrees     QTcB Int : 406 ms      Normal sinus rhythm   Normal ECG   When compared with ECG of 04-Nov-2024 10:10, (Unconfirmed)   No significant change was found      Referred By:            Confirmed By:           Procedures    MDM      Initial impression of presenting illness and DDX: Adult male previously diagnosed with reflex/cough related syncope presenting the emergency department with continued syncope.  Today's episode was not preceded by cough.  he has had no seizure activity with these episodes.  As opposed to previous episodes, today syncopal event was associated with hypoxia lasting almost a minute.  He is not hypoxic.  He is not tachycardic.  EKG is nonischemic.    differential diagnosis includes but no limited to the following: Cough related syncope still in  differential however I am concerned about this change in clinical presentation.  I would not expect him to continue to have syncopal episodes with an improving cough.  Is unclear if this is cardiac dysrhythmia, atypical ACS, PE etc.    initial plan and/or treatments: Laboratory studies, CTA for pulmonary embolism.  Will continue to monitor.  Will address symptoms as they arise.  In review of his labs from 11/4/2024-his workup overall was nonactionable.  Respiratory panel was negative.  Chest x-ray showed bronchopneumonia.  EKG was nonischemic.    diagnostic plan was ordered and interpreted by CAMPBELL Allen MD.     ED Course as of 11/08/24 1658   Fri Nov 08, 2024   1111 Lactic Acid, Plasma [DICK]   1111 Lactate: 1.0 [DICK]   1111 C-Reactive Protein: <0.30 [DICK]   1111 proBNP: <36.0 [DICK]   1112 HS Troponin T: 10 [DICK]   1113 CO2(!): 30.3 [DICK]   1114 Laboratory studies demonstrate no significant lactate elevation.  C-reactive protein is undetectable.  BNP is within normal limits-no labs evidence of ventricular distention.  Troponin within normal limits.  Hemoglobin is elevated 18-previous hemoglobin was 16.3 4 days ago.  CO2 is 30.  It was normal at 26.4 previously.  Together with a hemoglobin this may represent some element of volume contraction-will give 1 L of fluid. [DICK]   1115 Chest x-ray demonstrates no evidence of pneumothorax. [DICK]   1116 An EKG was ordered, reviewed, and interpreted by myself:  Rate: 75.    Rhythm: Sinus rhythm with normal axis    QTc: 406,     IA interval is normal.  QRS is not wide    ST elevation: n. STEMI: N         [DICK]   1141 There is no pulmonary embolism. [DICK]   1141 Will admit this patient for observation.  He would benefit from continued cardiac monitoring. will obtain an echocardiogram and Holter monitor. [DICK]      ED Course User Index  [DICK] Shay Allen MD        Medications   sodium chloride 0.9 % flush 10 mL (has no administration in time range)   amoxicillin-clavulanate (AUGMENTIN)  875-125 MG per tablet 1 tablet (has no administration in time range)   pantoprazole (PROTONIX) EC tablet 40 mg (has no administration in time range)   sodium chloride 0.9 % flush 10 mL ( Intravenous Canceled Entry 11/8/24 1436)   sodium chloride 0.9 % flush 10 mL (has no administration in time range)   sodium chloride 0.9 % infusion 40 mL (has no administration in time range)   nicotine (NICODERM CQ) 14 MG/24HR patch 1 patch (1 patch Transdermal Not Given 11/8/24 1421)   sodium chloride 0.9 % bolus 1,000 mL (1,000 mL Intravenous New Bag 11/8/24 1125)   iopamidol (ISOVUE-370) 76 % injection 100 mL (85 mL Intravenous Given 11/8/24 1121)   iopamidol (ISOVUE-370) 76 % injection 100 mL (75 mL Intravenous Given 11/8/24 1307)       HEART SCORE   No data recorded           -----  ED Disposition       ED Disposition   Decision to Admit    Condition   --    Comment   --             Final diagnoses:   Syncope and collapse     Your Follow-Up Providers    Follow-up information has not been specified.       Contact information for after-discharge care    Follow-up information has not been specified.          Your medication list        CONTINUE taking these medications        Instructions Last Dose Given Next Dose Due   albuterol sulfate  (90 Base) MCG/ACT inhaler  Commonly known as: PROVENTIL HFA;VENTOLIN HFA;PROAIR HFA      Inhale 2 puffs Every 4 (Four) Hours As Needed for Wheezing.       amoxicillin-clavulanate 875-125 MG per tablet  Commonly known as: AUGMENTIN      Take 1 tablet by mouth 2 (Two) Times a Day for 5 days.       azithromycin 250 MG tablet  Commonly known as: Zithromax Z-Chalo      Take 2 tablets by mouth on day 1, then 1 tablet daily on days 2-5       methylPREDNISolone 4 MG dose pack  Commonly known as: MEDROL      Take as directed on package instructions.

## 2024-11-08 NOTE — H&P
"  [facility]    CDU ENCOUNTER      Pt Name: Hussain Feliz  MRN: [id number]  YOB: 1975  Date of evaluation: 11/8/2024  Provider: Nat Singletary PA-C    CHIEF COMPLAINT       Chief Complaint   Patient presents with    Shortness of Breath     HISTORY OF PRESENT ILLNESS  (Location/Symptom, Timing/Onset, Context/Setting, Quality, Duration, Modifying Factors, Severity.)   Hussain Feliz is a 49 y.o. male presents to our CDU for further evaluation after having a syncopal episode that occurred PTA.  Wife states he became unresponsive, slumped over, she states she sat him back in the recliner and he was cyanotic with his jaw clenched.  She states he did not lose a pulse.      Wife does mention he has had an upper respiratory infection over the last week and has been taking Zithromax, Augmentin, a Medrol Dosepak, and using an albuterol inhaler at home.  She states he has had approximately 12 \"episodes,\" over the last 6 days where he has had a brief episode of unconsciousness and does not remember what happened.  She states one of the earlier episodes he was lying in the bed and became stiff in his arms and legs and staring off.  He states he does not remember what happens during the episode.  No incontinence.    He states the cough has improved.  He is currently complaining of a headache.    Nursing notes were reviewed.  REVIEW OF SYSTEMS    (2-9 systems for level 4, 10 or more for level 5)   Review of Systems   Constitutional:  Negative for chills and fever.   HENT:  Negative for ear pain and sore throat.    Respiratory:  Positive for cough and shortness of breath.    Cardiovascular:  Negative for chest pain.   Gastrointestinal:  Negative for abdominal pain, diarrhea, nausea and vomiting.   Genitourinary:  Negative for dysuria and frequency.   Neurological:  Positive for syncope and headaches. Negative for dizziness.   All other systems reviewed and are negative.     All systems reviewed and negative except " for those discussed in HPI.   PAST MEDICAL HISTORY     Past Medical History:   Diagnosis Date    Heart burn      SURGICAL HISTORY     History reviewed. No pertinent surgical history.    CURRENT MEDICATIONS       Current Facility-Administered Medications:     amoxicillin-clavulanate (AUGMENTIN) 875-125 MG per tablet 1 tablet, 1 tablet, Oral, BID, Nat Singletary PA-C    nicotine (NICODERM CQ) 14 MG/24HR patch 1 patch, 1 patch, Transdermal, Q24H, Nat Singletary PA-C    [START ON 11/9/2024] pantoprazole (PROTONIX) EC tablet 40 mg, 40 mg, Oral, Q AM, Nat Singletary PA-MARGARET    sodium chloride 0.9 % flush 10 mL, 10 mL, Intravenous, PRN, Nat Singletary, PA-C    sodium chloride 0.9 % flush 10 mL, 10 mL, Intravenous, Q12H, Nat Singletary, PA-C    sodium chloride 0.9 % flush 10 mL, 10 mL, Intravenous, PRN, Nat Singletary, PA-C    sodium chloride 0.9 % infusion 40 mL, 40 mL, Intravenous, PRN, Nat Singletary PA-MARGARET    ALLERGIES     Patient has no known allergies.    FAMILY HISTORY       Family History   Problem Relation Age of Onset    Hypertension Mother     Obesity Mother     Arthritis Mother      SOCIAL HISTORY       Social History     Socioeconomic History    Marital status:    Tobacco Use    Smoking status: Some Days     Current packs/day: 1.00     Types: Cigarettes    Smokeless tobacco: Never    Tobacco comments:     1 pack per week   Vaping Use    Vaping status: Never Used   Substance and Sexual Activity    Alcohol use: Yes     Comment: maybe one drink week    Drug use: Never    Sexual activity: Defer     PHYSICAL EXAM    (up to 7 for level 4, 8 or more for level 5)   Physical Exam  Vitals and nursing note reviewed.   Constitutional:       Appearance: Normal appearance.   HENT:      Head: Normocephalic and atraumatic.   Eyes:      Extraocular Movements: Extraocular movements intact.      Pupils: Pupils are equal, round, and reactive to light.   Cardiovascular:      Rate and Rhythm: Normal rate  and regular rhythm.      Pulses: Normal pulses.   Pulmonary:      Effort: Pulmonary effort is normal.      Breath sounds: Wheezing present.   Abdominal:      General: Abdomen is flat. Bowel sounds are normal.      Palpations: Abdomen is soft.   Musculoskeletal:         General: Normal range of motion.   Skin:     General: Skin is warm and dry.   Neurological:      General: No focal deficit present.      Mental Status: He is alert and oriented to person, place, and time.   Psychiatric:         Mood and Affect: Mood normal.       DIAGNOSTIC RESULTS     EKG: All EKGs are interpreted by the Emergency Department Physician who either signs or Co-signs this chart in the absence of a cardiologist.    ECG 12 Lead ED Triage Standing Order; SOA   Preliminary Result   Test Reason : ED Triage Standing Order~   Blood Pressure :   */*   mmHG   Vent. Rate :  75 BPM     Atrial Rate :  75 BPM      P-R Int : 132 ms          QRS Dur :  92 ms       QT Int : 364 ms       P-R-T Axes :  38  33  14 degrees     QTcB Int : 406 ms      Normal sinus rhythm   Normal ECG   When compared with ECG of 04-Nov-2024 10:10, (Unconfirmed)   No significant change was found      Referred By:            Confirmed By:         RADIOLOGY:   Non-plain film images such as CT, Ultrasound and MRI are read by the radiologist. Plain radiographic images are visualized and preliminarily interpreted by the emergency physician with the below findings:    [x] Radiologist's Report Reviewed:  MRI Brain Without Contrast   Final Result   Impression:   No acute intracranial abnormality            Electronically Signed: Cristian Richards MD     11/8/2024 3:51 PM EST     Workstation ID: [id number]      CT Head Without Contrast   Final Result   Impression:   No acute intracranial process identified.            Electronically Signed: Luis Dennis MD     11/8/2024 1:18 PM EST     Workstation ID: [id number]      CT Angiogram Neck   Final Result   Impression:   Major arterial  vasculature within head and neck appears widely patent, with no hemodynamically significant stenosis, dissection, thrombus, or aneurysm.         Electronically Signed: Luis Dennis MD     11/8/2024 1:29 PM EST     Workstation ID: [id number]      CT Angiogram Head   Final Result   Impression:   Major arterial vasculature within head and neck appears widely patent, with no hemodynamically significant stenosis, dissection, thrombus, or aneurysm.         Electronically Signed: Luis Dennis MD     11/8/2024 1:29 PM EST     Workstation ID: [id number]      CT Angiogram Chest   Final Result   1.No acute abnormality is identified within the thorax. Specifically, there is no evidence of acute pulmonary embolism.   2.Please see above for additional details.            Electronically Signed: Cas Orozco MD     11/8/2024 11:35 AM EST     Workstation ID: [id number]      XR Chest 1 View   Final Result   Impression:   Hypoinspiratory film demonstrating no acute cardiopulmonary process         Electronically Signed: Ranjith Garcia     11/8/2024 11:08 AM EST     Workstation ID: [id number]        ED BEDSIDE ULTRASOUND:   Performed by ED Physician - none    LABS:    I have reviewed and interpreted all of the currently available lab results from this visit (if applicable):  Results for orders placed or performed during the hospital encounter of 11/08/24   Comprehensive Metabolic Panel    Collection Time: 11/08/24 10:34 AM    Specimen: Blood   Result Value Ref Range    Glucose 103 (H) 65 - 99 mg/dL    BUN 13 6 - 20 mg/dL    Creatinine 1.12 0.76 - 1.27 mg/dL    Sodium 143 136 - 145 mmol/L    Potassium 4.2 3.5 - 5.2 mmol/L    Chloride 106 98 - 107 mmol/L    CO2 30.3 (H) 22.0 - 29.0 mmol/L    Calcium 8.9 8.6 - 10.5 mg/dL    Total Protein 6.8 6.0 - 8.5 g/dL    Albumin 4.3 3.5 - 5.2 g/dL    ALT (SGPT) 41 1 - 41 U/L    AST (SGOT) 32 1 - 40 U/L    Alkaline Phosphatase 79 39 - 117 U/L    Total Bilirubin 0.8 0.0 - 1.2 mg/dL    Globulin 2.5  gm/dL    A/G Ratio 1.7 g/dL    BUN/Creatinine Ratio 11.6 7.0 - 25.0    Anion Gap 6.7 5.0 - 15.0 mmol/L    eGFR 81.0 >60.0 mL/min/1.73   BNP    Collection Time: 11/08/24 10:34 AM    Specimen: Blood   Result Value Ref Range    proBNP <36.0 0.0 - 450.0 pg/mL   Single High Sensitivity Troponin T    Collection Time: 11/08/24 10:34 AM    Specimen: Blood   Result Value Ref Range    HS Troponin T 10 <22 ng/L   CBC Auto Differential    Collection Time: 11/08/24 10:34 AM    Specimen: Blood   Result Value Ref Range    WBC 7.04 3.40 - 10.80 10*3/mm3    RBC 5.63 4.14 - 5.80 10*6/mm3    Hemoglobin 18.0 (H) 13.0 - 17.7 g/dL    Hematocrit 51.5 (H) 37.5 - 51.0 %    MCV 91.5 79.0 - 97.0 fL    MCH 32.0 26.6 - 33.0 pg    MCHC 35.0 31.5 - 35.7 g/dL    RDW 12.6 12.3 - 15.4 %    RDW-SD 42.6 37.0 - 54.0 fl    MPV 9.4 6.0 - 12.0 fL    Platelets 253 140 - 450 10*3/mm3    Neutrophil % 50.2 42.7 - 76.0 %    Lymphocyte % 36.1 19.6 - 45.3 %    Monocyte % 7.5 5.0 - 12.0 %    Eosinophil % 5.0 0.3 - 6.2 %    Basophil % 0.6 0.0 - 1.5 %    Immature Grans % 0.6 (H) 0.0 - 0.5 %    Neutrophils, Absolute 3.54 1.70 - 7.00 10*3/mm3    Lymphocytes, Absolute 2.54 0.70 - 3.10 10*3/mm3    Monocytes, Absolute 0.53 0.10 - 0.90 10*3/mm3    Eosinophils, Absolute 0.35 0.00 - 0.40 10*3/mm3    Basophils, Absolute 0.04 0.00 - 0.20 10*3/mm3    Immature Grans, Absolute 0.04 0.00 - 0.05 10*3/mm3   C-reactive Protein    Collection Time: 11/08/24 10:34 AM    Specimen: Blood   Result Value Ref Range    C-Reactive Protein <0.30 0.00 - 0.50 mg/dL   Lactic Acid, Plasma    Collection Time: 11/08/24 10:34 AM    Specimen: Blood   Result Value Ref Range    Lactate 1.0 0.5 - 2.0 mmol/L   Magnesium    Collection Time: 11/08/24 10:34 AM    Specimen: Blood   Result Value Ref Range    Magnesium 2.2 1.6 - 2.6 mg/dL   Green Top (Gel)    Collection Time: 11/08/24 10:34 AM   Result Value Ref Range    Extra Tube Hold for add-ons.    Lavender Top    Collection Time: 11/08/24 10:34 AM    Result Value Ref Range    Extra Tube hold for add-on    Gold Top - SST    Collection Time: 11/08/24 10:34 AM   Result Value Ref Range    Extra Tube Hold for add-ons.    Gray Top    Collection Time: 11/08/24 10:34 AM   Result Value Ref Range    Extra Tube Hold for add-ons.    Light Blue Top    Collection Time: 11/08/24 10:34 AM   Result Value Ref Range    Extra Tube Hold for add-ons.    ECG 12 Lead ED Triage Standing Order; SOA    Collection Time: 11/08/24 10:37 AM   Result Value Ref Range    QT Interval 364 ms   Treadmill Stress Test    Collection Time: 11/08/24  1:43 PM   Result Value Ref Range    BH CV STRESS PROTOCOL 1 Ander     Stage 1 1.0     Duration Min Stage 1 3     Duration Sec Stage 1 0     Grade Stage 1 10     Speed Stage 1 1.7     BH CV STRESS METS STAGE 1 5.0     Baseline HR 67 bpm    Baseline /89 mmHg    O2 sat rest 97 %    Target HR (85%) 146 bpm    Max. Pred. HR (100%) 172 bpm    HR Stage 1 98     BP Stage 1 190/108     O2 Stage 1 96     Stage 2 2.0     HR Stage 2 115     BP Stage 2 217/109     O2 Stage 2 96     Duration Min Stage 2 3     Duration Sec Stage 2 0     Grade Stage 2 12     Speed Stage 2 2.5     BH CV STRESS METS STAGE 2 7.5     Stage 3 3.0     HR Stage 3 148     BP Stage 3 167/63     O2 Stage 3 97     Duration Min Stage 3 2     Duration Sec Stage 3 50     Grade Stage 3 14     Speed Stage 3 3.4     BH CV STRESS METS STAGE 3 10.0     Peak  bpm    Peak /109 mmHg    O2 sat peak 96 %    Recovery HR 96 bpm    Recovery /87 mmHg    Recovery O2 96 %    Percent Max Pred HR 87.79 %    Exercise duration (min) 8 min    Exercise duration (sec) 50 sec    Estimated workload 10.1 METS    Percent Target  %   Adult Transthoracic Echo Complete w/ Color, Spectral and Contrast (If Necessary Per Protocol)    Collection Time: 11/08/24  2:11 PM   Result Value Ref Range    EF(MOD-bp) 63.7 %    EF_3D-VOL 63.0 %    LVIDd 5.4 cm    LVIDs 3.4 cm    IVSd 0.90 cm    LVPWd 0.90 cm    FS  37.0 %    IVS/LVPW 1.00 cm    ESV(cubed) 39.3 ml    LV Sys Vol (BSA corrected) 12.5 cm2    EDV(cubed) 157.5 ml    LV Wright Vol (BSA corrected) 35.7 cm2    LV mass(C)d 180.1 grams    LVOT area 3.5 cm2    LVOT diam 2.10 cm    EDV(MOD-sp2) 96.4 ml    EDV(MOD-sp4) 82.0 ml    ESV(MOD-sp2) 37.6 ml    ESV(MOD-sp4) 28.8 ml    SV(MOD-sp2) 58.8 ml    SV(MOD-sp4) 53.2 ml    SVi(MOD-SP2) 25.6 ml/m2    SVi(MOD-SP4) 23.1 ml/m2    SVi (LVOT) 34.2 ml/m2    EF(MOD-sp2) 61.0 %    EF(MOD-sp4) 64.9 %    MV E max ever 70.7 cm/sec    MV A max ever 69.2 cm/sec    MV dec time 0.23 sec    MV E/A 1.02     LA ESV Index (BP) 21.0 ml/m2    Med Peak E' Ever 8.4 cm/sec    Lat Peak E' Ever 11.5 cm/sec    Avg E/e' ratio 7.11     SV(LVOT) 78.6 ml    RV Base 3.3 cm    RV Mid 2.20 cm    RV Length 7.1 cm    TAPSE (>1.6) 2.8 cm    RV S' 14.9 cm/sec    LA dimension (2D)  3.7 cm    LV V1 max 129.0 cm/sec    LV V1 max PG 6.7 mmHg    LV V1 mean PG 3.0 mmHg    LV V1 VTI 22.7 cm    Ao pk ever 135.0 cm/sec    Ao max PG 7.3 mmHg    Ao mean PG 4.0 mmHg    Ao V2 VTI 26.1 cm    ROSA(I,D) 3.0 cm2    MV max PG 2.6 mmHg    MV mean PG 1.00 mmHg    MV V2 VTI 23.1 cm    MV P1/2t 74.5 msec    MVA(P1/2t) 3.0 cm2    MVA(VTI) 3.4 cm2    MV dec slope 305.0 cm/sec2    RAP systole 3.0 mmHg    PA V2 max 115.0 cm/sec    PA acc time 0.09 sec    PI end-d ever 84.1 cm/sec    Ao root diam 2.9 cm    Ascending aorta 2.9 cm   Respiratory Panel PCR w/COVID-19(SARS-CoV-2) [facility] In-House, NP Swab in UTM/VTM, 2 HR TAT - Swab, Nasopharynx    Collection Time: 11/08/24  2:36 PM    Specimen: Nasopharynx; Swab   Result Value Ref Range    ADENOVIRUS, PCR Not Detected Not Detected    Coronavirus 229E Not Detected Not Detected    Coronavirus HKU1 Not Detected Not Detected    Coronavirus NL63 Not Detected Not Detected    Coronavirus OC43 Not Detected Not Detected    COVID19 Not Detected Not Detected - Ref. Range    Human Metapneumovirus Not Detected Not Detected    Human  Rhinovirus/Enterovirus Not Detected Not Detected    Influenza A PCR Not Detected Not Detected    Influenza B PCR Not Detected Not Detected    Parainfluenza Virus 1 Not Detected Not Detected    Parainfluenza Virus 2 Not Detected Not Detected    Parainfluenza Virus 3 Not Detected Not Detected    Parainfluenza Virus 4 Not Detected Not Detected    RSV, PCR Not Detected Not Detected    Bordetella pertussis pcr Not Detected Not Detected    Bordetella parapertussis PCR Not Detected Not Detected    Chlamydophila pneumoniae PCR Not Detected Not Detected    Mycoplasma pneumo by PCR Not Detected Not Detected   High Sensitivity Troponin T    Collection Time: 11/08/24  2:36 PM    Specimen: Arm, Left; Blood   Result Value Ref Range    HS Troponin T 8 <22 ng/L   Blood Gas, Arterial With Co-Ox    Collection Time: 11/08/24  3:19 PM    Specimen: Arterial Blood   Result Value Ref Range    Site Right Radial     Grady's Test N/A     pH, Arterial 7.408 7.350 - 7.450 pH units    pCO2, Arterial 40.1 35.0 - 45.0 mm Hg    pO2, Arterial 62.9 (L) 83.0 - 108.0 mm Hg    HCO3, Arterial 25.3 20.0 - 26.0 mmol/L    Base Excess, Arterial 0.6 0.0 - 2.0 mmol/L    Hemoglobin, Blood Gas 18.0 14 - 18 g/dL    Hematocrit, Blood Gas 55.2 %    Oxyhemoglobin 91.5 (L) 94 - 99 %    Methemoglobin 0.00 0.00 - 1.50 %    Carboxyhemoglobin 0.9 0 - 2 %    CO2 Content 26.5 22 - 33 mmol/L    Temperature 37.0     Barometric Pressure for Blood Gas      Modality Room Air     FIO2 21 %    Rate 0 Breaths/minute    PIP 0 cmH2O    IPAP 0     EPAP 0     pH, Temp Corrected 7.408 pH Units    pCO2, Temperature Corrected 40.1 35 - 48 mm Hg    pO2, Temperature Corrected 62.9 (L) 83 - 108 mm Hg   Urinalysis With Microscopic If Indicated (No Culture) - Urine, Clean Catch    Collection Time: 11/08/24  3:55 PM    Specimen: Urine, Clean Catch   Result Value Ref Range    Color, UA Yellow Yellow, Straw    Appearance, UA Clear Clear    pH, UA 6.5 5.0 - 8.0    Specific Gravity, UA <=1.005  1.005 - 1.030    Glucose, UA Negative Negative    Ketones, UA Negative Negative    Bilirubin, UA Negative Negative    Blood, UA Negative Negative    Protein, UA Negative Negative    Leuk Esterase, UA Negative Negative    Nitrite, UA Negative Negative    Urobilinogen, UA 0.2 E.U./dL 0.2 - 1.0 E.U./dL      All other labs were within normal range or not returned as of this dictation.    EMERGENCY DEPARTMENT COURSE and DIFFERENTIAL DIAGNOSIS/MDM:   Vitals:    Vitals:    11/08/24 1418 11/08/24 1455 11/08/24 1500 11/08/24 1606   BP: 121/66 125/80 130/87 131/78   BP Location: Left arm Left arm Left arm Left arm   Patient Position: Lying Sitting Standing Lying   Pulse: 84 72 85 71   Resp: 16   22   Temp:    98.4 °F (36.9 °C)   TempSrc:    Oral   SpO2: 95% 94% 96% 94%   Weight:       Height:           ED Course as of 11/08/24 1757   Fri Nov 08, 2024   1111 Lactic Acid, Plasma [DICK]   1111 Lactate: 1.0 [DICK]   1111 C-Reactive Protein: <0.30 [DICK]   1111 proBNP: <36.0 [DICK]   1112 HS Troponin T: 10 [DICK]   1113 CO2(!): 30.3 [DICK]   1114 Laboratory studies demonstrate no significant lactate elevation.  C-reactive protein is undetectable.  BNP is within normal limits-no labs evidence of ventricular distention.  Troponin within normal limits.  Hemoglobin is elevated 18-previous hemoglobin was 16.3 4 days ago.  CO2 is 30.  It was normal at 26.4 previously.  Together with a hemoglobin this may represent some element of volume contraction-will give 1 L of fluid. [DICK]   1115 Chest x-ray demonstrates no evidence of pneumothorax. [DICK]   1116 An EKG was ordered, reviewed, and interpreted by myself:  Rate: 75.    Rhythm: Sinus rhythm with normal axis    QTc: 406,     [place] interval is normal.  QRS is not wide    ST elevation: n. STEMI: N         [DICK]   1141 There is no pulmonary embolism. [DICK]   1141 Will admit this patient for observation.  He would benefit from continued cardiac monitoring. will obtain an echocardiogram and Holter monitor. [DICK]       ED Course User Index  [DICK] Shay Allen MD       MDM     Awaiting results from MRI, CTA head and neck, CT head without, stress test, and echocardiogram.  Disposition pending those results.    MEDICATIONS ADMINISTERED IN ED:  Medications   sodium chloride 0.9 % flush 10 mL (has no administration in time range)   amoxicillin-clavulanate (AUGMENTIN) 875-125 MG per tablet 1 tablet (has no administration in time range)   pantoprazole (PROTONIX) EC tablet 40 mg (has no administration in time range)   sodium chloride 0.9 % flush 10 mL ( Intravenous Canceled Entry 11/8/24 1436)   sodium chloride 0.9 % flush 10 mL (has no administration in time range)   sodium chloride 0.9 % infusion 40 mL (has no administration in time range)   nicotine (NICODERM CQ) 14 MG/24HR patch 1 patch (1 patch Transdermal Not Given 11/8/24 1421)   sodium chloride 0.9 % bolus 1,000 mL (1,000 mL Intravenous New Bag 11/8/24 1125)   iopamidol (ISOVUE-370) 76 % injection 100 mL (85 mL Intravenous Given 11/8/24 1121)   iopamidol (ISOVUE-370) 76 % injection 100 mL (75 mL Intravenous Given 11/8/24 1307)       PROCEDURES:  Procedures:none      CRITICAL CARE TIME    Total Critical Care time was 0 minutes, excluding separately reportable procedures.   There was a high probability of clinically significant/life threatening deterioration in the patient's condition which required my urgent intervention.    INITIAL IMPRESSION      1. Syncope and collapse        DISPOSITION/PLAn     Pending results.    PATIENT REFERRED TO:  No follow-up provider specified.    MEDICATIONS:     Medication List        CONTINUE taking these medications      albuterol sulfate  (90 Base) MCG/ACT inhaler  Commonly known as: PROVENTIL HFA;VENTOLIN HFA;PROAIR HFA  Inhale 2 puffs Every 4 (Four) Hours As Needed for Wheezing.     amoxicillin-clavulanate 875-125 MG per tablet  Commonly known as: AUGMENTIN  Take 1 tablet by mouth 2 (Two) Times a Day for 5 days.     azithromycin  250 MG tablet  Commonly known as: Zithromax Z-Chalo  Take 2 tablets by mouth on day 1, then 1 tablet daily on days 2-5     methylPREDNISolone 4 MG dose pack  Commonly known as: MEDROL  Take as directed on package instructions.            Comment: Please note this report has been produced using speech recognition software.      Nat Singletary PA-C

## 2024-11-08 NOTE — ED NOTES
" Hussain Chuymiguel Feliz    Nursing Report ED to Floor:  Mental status: alert and oriented x 4   Ambulatory status: ad mary  Oxygen Therapy:  none  Cardiac Rhythm: NSR  Admitted from: home/ED  Safety Concerns:  none  Social Issues: none  ED Room #:  06    ED Nurse Phone Extension - [phone] or may call [phone].      HPI:   Chief Complaint   Patient presents with    Shortness of Breath       Past Medical History:  Past Medical History:   Diagnosis Date    Heart burn         Past Surgical History:  History reviewed. No pertinent surgical history.     Admitting Doctor:   No admitting provider for patient encounter.    Consulting Provider(s):  Consults       No orders found from 10/10/2024 to 11/9/2024.             Admitting Diagnosis:   The encounter diagnosis was Syncope and collapse.    Most Recent Vitals:   Vitals:    11/08/24 1033 11/08/24 1034 11/08/24 1045 11/08/24 1100   BP: 130/80  126/91 137/82   Pulse: 75  63 63   Resp: 18      Temp:  98.1 °F (36.7 °C)     TempSrc:  Oral     SpO2: 96%  95%    Weight: 111 kg (245 lb)      Height: 180.3 cm (71\")          Active LDAs/IV Access:   Lines, Drains & Airways       Active LDAs       Name Placement date Placement time Site Days    Peripheral IV 11/08/24 1036 Right Antecubital 11/08/24  1036  Antecubital  less than 1                    Labs (abnormal labs have a star):   Labs Reviewed   COMPREHENSIVE METABOLIC PANEL - Abnormal; Notable for the following components:       Result Value    Glucose 103 (*)     CO2 30.3 (*)     All other components within normal limits    Narrative:     GFR Normal >60  Chronic Kidney Disease <60  Kidney Failure <15     CBC WITH AUTO DIFFERENTIAL - Abnormal; Notable for the following components:    Hemoglobin 18.0 (*)     Hematocrit 51.5 (*)     Immature Grans % 0.6 (*)     All other components within normal limits   BNP (IN-HOUSE) - Normal    Narrative:     This assay is used as an aid in the diagnosis of individuals suspected of having heart failure. It " can be used as an aid in the diagnosis of acute decompensated heart failure (ADHF) in patients presenting with signs and symptoms of ADHF to the emergency department (ED). In addition, NT-proBNP of <300 pg/mL indicates ADHF is not likely.    Age Range Result Interpretation  NT-proBNP Concentration (pg/mL:      <50             Positive            >450                   Gray                 300-450                    Negative             <300    50-75           Positive            >900                  Gray                300-900                  Negative            <300      >75             Positive            >1800                  Gray                300-1800                  Negative            <300   SINGLE HS TROPONIN T - Normal   C-REACTIVE PROTEIN - Normal   LACTIC ACID, PLASMA - Normal   RAINBOW DRAW    Narrative:     The following orders were created for panel order [place] Draw.  Procedure                               Abnormality         Status                     ---------                               -----------         ------                     Green Top (Gel)[[id number]]                                  Final result               Lavender Top[[id number]]                                     Final result               Gold Top - SST[[id number]]                                   Final result               Bernal Top[[id number]]                                         Final result               Light Blue Top[[id number]]                                   Final result                 Please view results for these tests on the individual orders.   CBC AND DIFFERENTIAL    Narrative:     The following orders were created for panel order CBC & Differential.  Procedure                               Abnormality         Status                     ---------                               -----------         ------                     CBC Auto Differential[[id number]]        Abnormal            Final result                  Please view results for these tests on the individual orders.   GREEN TOP   LAVENDER TOP   GOLD TOP - SST   GRAY TOP   LIGHT BLUE TOP       Meds Given in ED:   Medications   sodium chloride 0.9 % flush 10 mL (has no administration in time range)   sodium chloride 0.9 % bolus 1,000 mL (1,000 mL Intravenous New Bag 11/8/24 1125)   iopamidol (ISOVUE-370) 76 % injection 100 mL (85 mL Intravenous Given 11/8/24 1121)           Last NIH score:                                                          Dysphagia screening results:  Patient Factors Component (Dysphagia:Stroke or Rule-out)  Best Eye Response: 4-->(E4) spontaneous (11/08/24 1053)  Best Motor Response: 6-->(M6) obeys commands (11/08/24 1053)  Best Verbal Response: 5-->(V5) oriented (11/08/24 1053)  Radha Coma Scale Score: 15 (11/08/24 1053)     Radha Coma Scale:  No data recorded     CIWA:        Restraint Type:            Isolation Status:  No active isolations

## 2024-11-08 NOTE — DISCHARGE SUMMARY
[facility] CDU  TRANsFER SUMMARY      Patient Name: Hussain Feliz  : 1975  MRN: [id number]    Date of Admission: 2024  Date of Discharge:  24   Primary Care Physician: Hilda Beckett PA    Presenting Problem:   Syncope [R55]    Active and Resolved Hospital Problems:  Active Hospital Problems    Diagnosis POA    **Syncope [R55] Yes    Transient amnesia [R41.3] Yes      Resolved Hospital Problems   No resolved problems to display.     Hospital Course: A 49-year-old male presented to our CDU for further evaluation after a syncopal episode that occurred this morning.  Imaging including MRI of the brain, CTA head and neck, and CT of the head without were found to be unremarkable.  History from both the patient and his spouse are concerning for possible seizures.  We will therefore send the patient to [facility] for further evaluation by neurology and cardiology.    Nurses Notes reviewed and agree, including vitals, allergies, social history and prior medical history.     REVIEW OF SYSTEMS: All systems reviewed and not pertinent unless noted.  Review of Systems   Constitutional:  Negative for chills and fever.   HENT:  Negative for ear pain and sore throat.    Respiratory:  Positive for cough and shortness of breath.    Cardiovascular:  Negative for chest pain.   Gastrointestinal:  Negative for abdominal pain, diarrhea, nausea and vomiting.   Genitourinary:  Negative for dysuria and frequency.   Neurological:  Positive for syncope and headaches. Negative for dizziness.   All other systems reviewed and are negative.    Past Medical History:   Diagnosis Date    Heart burn      Allergies:    Patient has no known allergies.      History reviewed. No pertinent surgical history.    Social History     Socioeconomic History    Marital status:    Tobacco Use    Smoking status: Some Days     Current packs/day: 1.00     Types: Cigarettes    Smokeless tobacco: Never    Tobacco  "comments:     1 pack per week   Vaping Use    Vaping status: Never Used   Substance and Sexual Activity    Alcohol use: Yes     Comment: maybe one drink week    Drug use: Never    Sexual activity: Defer     Family History   Problem Relation Age of Onset    Hypertension Mother     Obesity Mother     Arthritis Mother      Objective  Physical Exam:  /78 (BP Location: Left arm, Patient Position: Lying)   Pulse 71   Temp 98.4 °F (36.9 °C) (Oral)   Resp 22   Ht 180.3 cm (70.98\")   Wt 111 kg (244 lb 11.4 oz)   SpO2 94%   BMI 34.15 kg/m²      Physical Exam  Vitals and nursing note reviewed.   Constitutional:       Appearance: Normal appearance.   HENT:      Head: Normocephalic and atraumatic.   Eyes:      Extraocular Movements: Extraocular movements intact.      Pupils: Pupils are equal, round, and reactive to light.   Cardiovascular:      Rate and Rhythm: Normal rate and regular rhythm.      Pulses: Normal pulses.   Pulmonary:      Effort: Pulmonary effort is normal.      Breath sounds: Wheezing present.   Abdominal:      General: Abdomen is flat. Bowel sounds are normal.      Palpations: Abdomen is soft.   Musculoskeletal:         General: Normal range of motion.   Skin:     General: Skin is warm and dry.   Neurological:      General: No focal deficit present.      Mental Status: He is alert and oriented to person, place, and time.   Psychiatric:         Mood and Affect: Mood normal.         Behavior: Behavior normal.       Procedures:none    MRI Brain Without Contrast    Result Date: 11/8/2024  MRI BRAIN WO CONTRAST Date of Exam: 11/8/2024 3:13 PM EST Indication: syncope.  Comparison: CT same day Technique:  Routine multiplanar/multisequence sequence images of the brain were obtained without contrast administration. Findings: Diffusion-weighted imaging demonstrates no acute restriction abnormality. Midline structures of the brain are grossly unremarkable in appearance. Pituitary and sella structures and " craniocervical junction are grossly unremarkable in their appearance. The ventricles, cisterns and sulci appear within normal limits. No acute intracranial hemorrhage or mass effect is noted. Gray and white matter signal characteristics appear within normal limits. Major intracranial flow voids appear grossly patent. Please see CT angiogram same day for further evaluation. Mild mucosal thickening of the paranasal sinuses noted. Globes and orbits appear grossly unremarkable in their appearance. Mastoid air cells appear to be grossly clear. Cerebral pontine angle structures and inner ear structures appear grossly unremarkable in appearance.     Impression: Impression: No acute intracranial abnormality Electronically Signed: Cristian Richards MD  11/8/2024 3:51 PM EST  Workstation ID: [id number]    Treadmill Stress Test    Result Date: 11/8/2024    Pt denied chest pain/discomfort during exercise. Pt did c/o SOA (SP02 WNL on RA) that resolved in recovery.   THR of 146 achieved at 8:40.   Expected exercise duration = 10:40. Actual exercise duration = 8:50. Pt stopped d/t leg fatigue.   DTS of 15.8.   SR-ST with rare PVC in recovery.   No ST-T wave changes noted.   No ECG evidence of myocardial ischemia.   Negative clinical evidence of myocardial ischemia.   Findings consistent with a normal ECG stress test.     CT Angiogram Neck    Result Date: 11/8/2024  CT ANGIOGRAM NECK, CT ANGIOGRAM HEAD Date of Exam: 11/8/2024 12:54 PM EST Indication: syncope. Comparison: CT head from earlier today Technique: CTA of the head and neck was performed before and after the uneventful intravenous administration of 75 mL Isovue-370. Reconstructed coronal and sagittal images were also obtained. In addition, a 3-D volume rendered image was created for interpretation. Automated exposure control and iterative reconstruction methods were used. Findings: There is a three-vessel aortic arch. The right common carotid artery is widely patent with mild  plaque distally. The right carotid bifurcation is widely patent. The right internal carotid artery is widely patent. The left common carotid artery is widely patent. The left carotid bifurcation is widely patent. The left internal carotid artery is widely patent. The right vertebral artery is widely patent. The left vertebral artery is widely patent. The left vertebral artery is dominant. The bilateral middle cerebral, bilateral anterior cerebral, and anterior communicating arteries are widely patent. The basilar and bilateral posterior cerebral arteries are widely patent. There are patent bilateral posterior communicating arteries. No intracranial aneurysm is identified. The visualized portions of the bilateral superior cerebellar, anteroinferior cerebellar, and posterior inferior cerebellar arteries are unremarkable.     Impression: Impression: Major arterial vasculature within head and neck appears widely patent, with no hemodynamically significant stenosis, dissection, thrombus, or aneurysm. Electronically Signed: Luis Dennis MD  11/8/2024 1:29 PM EST  Workstation ID: [id number]    CT Angiogram Head    Result Date: 11/8/2024  CT ANGIOGRAM NECK, CT ANGIOGRAM HEAD Date of Exam: 11/8/2024 12:54 PM EST Indication: syncope. Comparison: CT head from earlier today Technique: CTA of the head and neck was performed before and after the uneventful intravenous administration of 75 mL Isovue-370. Reconstructed coronal and sagittal images were also obtained. In addition, a 3-D volume rendered image was created for interpretation. Automated exposure control and iterative reconstruction methods were used. Findings: There is a three-vessel aortic arch. The right common carotid artery is widely patent with mild plaque distally. The right carotid bifurcation is widely patent. The right internal carotid artery is widely patent. The left common carotid artery is widely patent. The left carotid bifurcation is widely patent. The  left internal carotid artery is widely patent. The right vertebral artery is widely patent. The left vertebral artery is widely patent. The left vertebral artery is dominant. The bilateral middle cerebral, bilateral anterior cerebral, and anterior communicating arteries are widely patent. The basilar and bilateral posterior cerebral arteries are widely patent. There are patent bilateral posterior communicating arteries. No intracranial aneurysm is identified. The visualized portions of the bilateral superior cerebellar, anteroinferior cerebellar, and posterior inferior cerebellar arteries are unremarkable.     Impression: Impression: Major arterial vasculature within head and neck appears widely patent, with no hemodynamically significant stenosis, dissection, thrombus, or aneurysm. Electronically Signed: Luis Dennis MD  11/8/2024 1:29 PM EST  Workstation ID: [id number]    CT Head Without Contrast    Result Date: 11/8/2024  CT HEAD WO CONTRAST Date of Exam: 11/8/2024 12:54 PM EST Indication: syncope. Comparison: None available. Technique: Axial CT images were obtained of the head without contrast administration.  Automated exposure control and iterative construction methods were used. Findings: No acute intracranial hemorrhage or extra-axial collection is identified. The ventricles appear normal in caliber, with no evidence of mass effect or midline shift. The basal cisterns appear patent. The gray-white differentiation appears preserved. The calvarium appear intact. The paranasal sinuses are clear. The mastoid air cells are well-aerated.     Impression: Impression: No acute intracranial process identified. Electronically Signed: Luis Dennis MD  11/8/2024 1:18 PM EST  Workstation ID: [id number]    CT Angiogram Chest    Result Date: 11/8/2024  CT ANGIOGRAM CHEST Date of Exam: 11/8/2024 11:15 AM EST Indication: syncope, concern for PE. Comparison: Chest x-ray from today Technique: CTA of the chest was performed  after the uneventful intravenous administration of 85 mL Isovue-370. Reconstructed coronal and sagittal images were also obtained. In addition, a 3-D volume rendered image was created for interpretation. Automated exposure control and iterative reconstruction methods were used. FINDINGS: Thoracic inlet: Unremarkable. Pulmonary arteries: No filling defects are identified within the pulmonary arteries to suggest acute pulmonary embolism. Great vessels: The thoracic aorta and proximal arch vessels appear unremarkable. Mediastinum/Katie: No pathologically enlarged mediastinal lymph nodes are seen. The esophagus is unremarkable. Lung parenchyma: Left upper lobe granuloma. Lungs appear otherwise adequately aerated. No acute infiltrate is seen. No suspicious pulmonary nodules are seen. Trachea and airways: The trachea and central airways appear unremarkable. Pleural space: No significant pleural effusion or pneumothorax. Heart and pericardium: The heart and pericardium appear unremarkable. Chest wall: No acute or suspicious osseous or soft tissue lesion is identified. Upper abdomen: No acute abnormality is identified within the visualized upper abdomen. There are several subcentimeter liver hypodensities which are too small to characterize.     Impression: 1.No acute abnormality is identified within the thorax. Specifically, there is no evidence of acute pulmonary embolism. 2.Please see above for additional details. Electronically Signed: Cas Orozco MD  11/8/2024 11:35 AM EST  Workstation ID: [id number]    XR Chest 1 View    Result Date: 11/8/2024  XR CHEST 1 VW Date of Exam: 11/8/2024 10:54 AM EST Indication: SOA triage protocol Comparison: 11/4/2024 Findings: Low lung volumes. Heart size and pulmonary vasculature are within normal limits. Mild atelectasis and vascular crowding in the lung bases due to low lung volumes. No suspicious infiltrate or edema. Calcified granuloma in the peripheral left midlung. Costophrenic  angle sharp     Impression: Impression: Hypoinspiratory film demonstrating no acute cardiopulmonary process Electronically Signed: Ranjith Garcia  11/8/2024 11:08 AM EST  Workstation ID: [id number]          Lab 11/08/24  1034   LACTATE 1.0       Site   Date Value Ref Range Status   11/08/2024 Right Radial  Final     Grady's Test   Date Value Ref Range Status   11/08/2024 N/A  Final     pH, Arterial   Date Value Ref Range Status   11/08/2024 7.408 7.350 - 7.450 pH units Final     pCO2, Arterial   Date Value Ref Range Status   11/08/2024 40.1 35.0 - 45.0 mm Hg Final     pO2, Arterial   Date Value Ref Range Status   11/08/2024 62.9 (L) 83.0 - 108.0 mm Hg Final     Comment:     84 Value below reference range     HCO3, Arterial   Date Value Ref Range Status   11/08/2024 25.3 20.0 - 26.0 mmol/L Final     Base Excess, Arterial   Date Value Ref Range Status   11/08/2024 0.6 0.0 - 2.0 mmol/L Final     Hemoglobin, Blood Gas   Date Value Ref Range Status   11/08/2024 18.0 14 - 18 g/dL Final     Comment:     83 Value above reference range     Hematocrit, Blood Gas   Date Value Ref Range Status   11/08/2024 55.2 % Final     Oxyhemoglobin   Date Value Ref Range Status   11/08/2024 91.5 (L) 94 - 99 % Final     Comment:     84 Value below reference range     Methemoglobin   Date Value Ref Range Status   11/08/2024 0.00 0.00 - 1.50 % Final     Carboxyhemoglobin   Date Value Ref Range Status   11/08/2024 0.9 0 - 2 % Final     CO2 Content   Date Value Ref Range Status   11/08/2024 26.5 22 - 33 mmol/L Final     Barometric Pressure for Blood Gas   Date Value Ref Range Status   11/08/2024   Final     Comment:     N/A     Modality   Date Value Ref Range Status   11/08/2024 Room Air  Final     FIO2   Date Value Ref Range Status   11/08/2024 21 % Final       Results from last 7 days   Lab Units 11/08/24  1436 11/08/24  1034 11/04/24  1017   HSTROP T ng/L 8 10 <6       Results from last 7 days   Lab Units 11/08/24  1034 11/04/24  1017   WBC  10*3/mm3 7.04 7.65   HEMOGLOBIN g/dL 18.0* 16.3   HEMATOCRIT % 51.5* 46.8   PLATELETS 10*3/mm3 253 211       Results from last 7 days   Lab Units 11/08/24  1034 11/04/24  1017   SODIUM mmol/L 143 142   POTASSIUM mmol/L 4.2 3.8   CHLORIDE mmol/L 106 109*   CO2 mmol/L 30.3* 26.4   BUN mg/dL 13 13   CREATININE mg/dL 1.12 0.97   CALCIUM mg/dL 8.9 8.3*   BILIRUBIN mg/dL 0.8 0.6   ALK PHOS U/L 79 72   ALT (SGPT) U/L 41 29   AST (SGOT) U/L 32 30   GLUCOSE mg/dL 103* 97         Diagnosis: Syncope, Transient Amnesia    MDM: Patient will be transferred to [facility] for further treatment evaluation.  I did discuss this case with Dr. Kerley (Hospitalist) he agreed further workup for seizures is warranted.  He states he will do a continuous EEG.  Patient stable at the time of transfer.  No seizure-like activity here in the CDU.    Medication List:      Your medication list        CONTINUE taking these medications        Instructions Last Dose Given Next Dose Due   albuterol sulfate  (90 Base) MCG/ACT inhaler  Commonly known as: PROVENTIL HFA;VENTOLIN HFA;PROAIR HFA      Inhale 2 puffs Every 4 (Four) Hours As Needed for Wheezing.       amoxicillin-clavulanate 875-125 MG per tablet  Commonly known as: AUGMENTIN      Take 1 tablet by mouth 2 (Two) Times a Day for 5 days.       azithromycin 250 MG tablet  Commonly known as: Zithromax Z-Chalo      Take 2 tablets by mouth on day 1, then 1 tablet daily on days 2-5       methylPREDNISolone 4 MG dose pack  Commonly known as: MEDROL      Take as directed on package instructions.               Nat Singletary PA-C   11/08/24   18:15 EST     Time Spent on Discharge:  I spent  30  minutes on this discharge activity which included: face-to-face encounter with the patient, reviewing the data in the system, coordination of the care with the nursing staff as well as consultants, documentation, and entering orders.

## 2024-11-09 ENCOUNTER — APPOINTMENT (OUTPATIENT)
Dept: NEUROLOGY | Facility: HOSPITAL | Age: 49
End: 2024-11-09
Payer: COMMERCIAL

## 2024-11-09 LAB
25(OH)D3 SERPL-MCNC: 18.2 NG/ML (ref 30–100)
ALBUMIN SERPL-MCNC: 3.8 G/DL (ref 3.5–5.2)
ALBUMIN/GLOB SERPL: 1.6 G/DL
ALP SERPL-CCNC: 75 U/L (ref 39–117)
ALT SERPL W P-5'-P-CCNC: 38 U/L (ref 1–41)
AMMONIA BLD-SCNC: 49 UMOL/L (ref 16–60)
ANION GAP SERPL CALCULATED.3IONS-SCNC: 11 MMOL/L (ref 5–15)
AST SERPL-CCNC: 26 U/L (ref 1–40)
BILIRUB SERPL-MCNC: 0.7 MG/DL (ref 0–1.2)
BUN SERPL-MCNC: 13 MG/DL (ref 6–20)
BUN/CREAT SERPL: 13.4 (ref 7–25)
CALCIUM SPEC-SCNC: 8.3 MG/DL (ref 8.6–10.5)
CHLORIDE SERPL-SCNC: 110 MMOL/L (ref 98–107)
CO2 SERPL-SCNC: 22 MMOL/L (ref 22–29)
CREAT SERPL-MCNC: 0.97 MG/DL (ref 0.76–1.27)
DEPRECATED RDW RBC AUTO: 42.7 FL (ref 37–54)
EGFRCR SERPLBLD CKD-EPI 2021: 96.3 ML/MIN/1.73
ERYTHROCYTE [DISTWIDTH] IN BLOOD BY AUTOMATED COUNT: 12.9 % (ref 12.3–15.4)
GLOBULIN UR ELPH-MCNC: 2.4 GM/DL
GLUCOSE SERPL-MCNC: 106 MG/DL (ref 65–99)
HCT VFR BLD AUTO: 47.4 % (ref 37.5–51)
HGB BLD-MCNC: 16.7 G/DL (ref 13–17.7)
MCH RBC QN AUTO: 32.4 PG (ref 26.6–33)
MCHC RBC AUTO-ENTMCNC: 35.2 G/DL (ref 31.5–35.7)
MCV RBC AUTO: 92 FL (ref 79–97)
PLATELET # BLD AUTO: 232 10*3/MM3 (ref 140–450)
PMV BLD AUTO: 9.4 FL (ref 6–12)
POTASSIUM SERPL-SCNC: 3.8 MMOL/L (ref 3.5–5.2)
PROT SERPL-MCNC: 6.2 G/DL (ref 6–8.5)
QT INTERVAL: 420 MS
RBC # BLD AUTO: 5.15 10*6/MM3 (ref 4.14–5.8)
SODIUM SERPL-SCNC: 143 MMOL/L (ref 136–145)
TROPONIN T SERPL HS-MCNC: 10 NG/L
VIT B12 BLD-MCNC: 582 PG/ML (ref 211–946)
WBC NRBC COR # BLD AUTO: 7.25 10*3/MM3 (ref 3.4–10.8)

## 2024-11-09 PROCEDURE — 99214 OFFICE O/P EST MOD 30 MIN: CPT | Performed by: STUDENT IN AN ORGANIZED HEALTH CARE EDUCATION/TRAINING PROGRAM

## 2024-11-09 PROCEDURE — 84425 ASSAY OF VITAMIN B-1: CPT | Performed by: FAMILY MEDICINE

## 2024-11-09 PROCEDURE — 82607 VITAMIN B-12: CPT | Performed by: FAMILY MEDICINE

## 2024-11-09 PROCEDURE — G0378 HOSPITAL OBSERVATION PER HR: HCPCS

## 2024-11-09 PROCEDURE — 99232 SBSQ HOSP IP/OBS MODERATE 35: CPT | Performed by: PEDIATRICS

## 2024-11-09 PROCEDURE — 93005 ELECTROCARDIOGRAM TRACING: CPT | Performed by: PHYSICIAN ASSISTANT

## 2024-11-09 PROCEDURE — 82140 ASSAY OF AMMONIA: CPT | Performed by: FAMILY MEDICINE

## 2024-11-09 PROCEDURE — 82306 VITAMIN D 25 HYDROXY: CPT | Performed by: FAMILY MEDICINE

## 2024-11-09 PROCEDURE — 80053 COMPREHEN METABOLIC PANEL: CPT | Performed by: PHYSICIAN ASSISTANT

## 2024-11-09 PROCEDURE — 95714 VEEG EA 12-26 HR UNMNTR: CPT

## 2024-11-09 PROCEDURE — 99204 OFFICE O/P NEW MOD 45 MIN: CPT | Performed by: INTERNAL MEDICINE

## 2024-11-09 PROCEDURE — 84484 ASSAY OF TROPONIN QUANT: CPT | Performed by: PHYSICIAN ASSISTANT

## 2024-11-09 PROCEDURE — 84207 ASSAY OF VITAMIN B-6: CPT | Performed by: FAMILY MEDICINE

## 2024-11-09 PROCEDURE — 95816 EEG AWAKE AND DROWSY: CPT | Performed by: PSYCHIATRY & NEUROLOGY

## 2024-11-09 PROCEDURE — 95816 EEG AWAKE AND DROWSY: CPT

## 2024-11-09 PROCEDURE — 85027 COMPLETE CBC AUTOMATED: CPT | Performed by: PHYSICIAN ASSISTANT

## 2024-11-09 RX ORDER — PROMETHAZINE HYDROCHLORIDE AND CODEINE PHOSPHATE 6.25; 1 MG/5ML; MG/5ML
5 SYRUP ORAL EVERY 6 HOURS PRN
Status: DISCONTINUED | OUTPATIENT
Start: 2024-11-09 | End: 2024-11-12 | Stop reason: HOSPADM

## 2024-11-09 RX ORDER — BENZONATATE 100 MG/1
100 CAPSULE ORAL 3 TIMES DAILY PRN
Status: DISCONTINUED | OUTPATIENT
Start: 2024-11-09 | End: 2024-11-12 | Stop reason: HOSPADM

## 2024-11-09 RX ADMIN — PANTOPRAZOLE SODIUM 40 MG: 40 TABLET, DELAYED RELEASE ORAL at 06:31

## 2024-11-09 NOTE — PLAN OF CARE
Goal Outcome Evaluation: On RA, NSR. Continuous EEG tonight. Wife at bedside. Continue plan of care.

## 2024-11-09 NOTE — CONSULTS
[facility] Neurology  Consult Note    Patient Name: Hussain Feliz  : 1975  MRN: [id number]  Primary Care Physician:  Hilda Beckett PA  Referring Physician: No ref. provider found  Date of admission: 2024    Subjective     Reason for Consult: Syncope    Hussain Feliz is a 49 y.o. male with no significant medical history who is presenting with episodes of altered consciousness.    Wife at bedside helps provide the history.    Patient has been sick with an upper respiratory virus for the last 2 weeks.  Approximately 1 week ago patient was coughing and then stopped breathing and then lost consciousness.  This only lasted a few seconds and then patient came to rather quickly.  This episode happened again the next day and wife took him to the ER and he was started on antibiotics and steroids.  He had 2 episodes where he was home by himself, 1 where he was sitting in the recliner and then the next thing he knew he was standing up in the living room, not knowing where he was with the TV remote on the floor.  He had another episode where he was also in the living room but did not know how he got there and thought he was playing with the kids, but no one else was in the room.    He had several more episodes of coughing, his face turning bright red, he stops breathing and he loses consciousness.  1 episode wife described as his arms outstretched and tense.  Another episode, which was the most intense, his face was extremely bright red/purple, eyes wide open, he did not breathe for 45 seconds.  She was about to call 911 when he suddenly came to.    After the episodes he does have some confusion, but he comes to rather quickly.  Wife describes him as face being red/purple and very tense looking, not like he is passing out.  He has not had bowel or bladder incontinence or tongue biting.      Review Of Systems   Constitutional:   Cardiovascular: Negative for chest pain or palpitations.  Respiratory:  Negative for shortness of breath or cough.  Gastrointestinal: Negative for nausea and vomiting.  Genitourinary: Negative for bladder incontinence.  Musculoskeletal: Negative for aches and pains in the muscles or joints.  Dermatological: Negative for skin breakdown.   Neurological: Negative for headache, pain, weakness or vision changes.     Personal History     Past Medical History:   Diagnosis Date    Heart burn        History reviewed. No pertinent surgical history.    Family History: family history includes Arthritis in his mother; Hypertension in his mother; Obesity in his mother. Otherwise pertinent FHx was reviewed and not pertinent to current issue.    Social History:  reports that he has been smoking cigarettes. He has never used smokeless tobacco. He reports current alcohol use. He reports that he does not use drugs.    Home Medications:   albuterol sulfate HFA, amoxicillin-clavulanate, azithromycin, and methylPREDNISolone    Current Medications:     Current Facility-Administered Medications:     acetaminophen (TYLENOL) tablet 650 mg, 650 mg, Oral, Q6H PRN, Zack Cornelius DO, 650 mg at 11/08/24 2337    benzonatate (TESSALON) capsule 100 mg, 100 mg, Oral, TID PRN, Maryuri Mccoy MD    nicotine (NICODERM CQ) 14 MG/24HR patch 1 patch, 1 patch, Transdermal, Q24H, Nat Singletary PA-C    pantoprazole (PROTONIX) EC tablet 40 mg, 40 mg, Oral, Q AM, Nat Singletary PA-C, 40 mg at 11/09/24 0631    promethazine-codeine (PHENERGAN with CODEINE) 6.25-10 MG/5ML syrup 5 mL, 5 mL, Oral, Q6H PRN, Maryuri Mccoy MD    sodium chloride 0.9 % flush 10 mL, 10 mL, Intravenous, PRN, Nat Singletary PA-MARGARET    sodium chloride 0.9 % flush 10 mL, 10 mL, Intravenous, Q12H, Nat Singletary PA-C    sodium chloride 0.9 % flush 10 mL, 10 mL, Intravenous, PRN, Nat Singletary PA-MARGARET    sodium chloride 0.9 % infusion 40 mL, 40 mL, Intravenous, PRN, Nat Singletary PA-C     Allergies:  No Known  "Allergies    Objective     Vitals:  Temp:  [97.7 °F (36.5 °C)-98.4 °F (36.9 °C)] 97.8 °F (36.6 °C)  Heart Rate:  [53-88] 58  Resp:  [13-22] 18  BP: (114-141)/(66-90) 121/78    Neurologic Exam   Mental status/Cognition: awake and alert  Speech/language: fluent; follows commands    Cranial nerves: tracks exameriner. Face appears symmetric. Mild hoarse voice.       Motor: No drift in any extremities, able to raise all to antigravity.    Laboratory Results:   Lab Results   Component Value Date    GLUCOSE 106 (H) 11/09/2024    CALCIUM 8.3 (L) 11/09/2024     11/09/2024    K 3.8 11/09/2024    CO2 22.0 11/09/2024     (H) 11/09/2024    BUN 13 11/09/2024    CREATININE 0.97 11/09/2024    BCR 13.4 11/09/2024    ANIONGAP 11.0 11/09/2024     Lab Results   Component Value Date    WBC 7.25 11/09/2024    HGB 16.7 11/09/2024    HCT 47.4 11/09/2024    MCV 92.0 11/09/2024     11/09/2024     No results found for: \"CHOL\"  Lab Results   Component Value Date    HDL 28 (L) 08/29/2023     Lab Results   Component Value Date     (H) 08/29/2023     Lab Results   Component Value Date    TRIG 100 08/29/2023     Lab Results   Component Value Date    HGBA1C 4.9 08/29/2023     Lab Results   Component Value Date    WOKBENWK40 500 08/29/2023     Lab Results   Component Value Date    FOLATE 3.9 08/29/2023     Ammonia 49  CRP <0.30  Lactate 1.0  UA unremarkable  RVP negative  Images/Procedures   CT head 11/8/2024  No acute findings    CTA brain and neck 11/8/2024  No LVO or significant stenosis    MRI brain without contrast 11/8/2024  No acute findings    Assessment / Plan   Active Hospital Problems:  Episodes of loss of consciousness  GERD    Brief Patient Summary:  Hussain Feliz is a 49 y.o. male with no significant medical history who is presenting with episodes of altered consciousness.  His exam is intact.  Wife describes multiple episodes of loss of consciousness, typically associated with coughing.  However he had 2 " episodes that were unwitnessed and he does not remember coughing preceding the event.  Episodes associated with coughing are unlikely due to seizures, more consistent with a laryngospasm due to his URI and severe coughing.  However the 2 episodes where he came to in the living room and did not know where he was are more concerning for possible seizure.  Will obtain a continuous EEG in hopes of spell capture.  Could consider a pulmonary consult if episodes continue      Plan:   -Continuous EEG for spell capture  -Consider pulmonary consult    I have discussed the above with the patient, family, bedside RN, hospitalist  Time spent with patient: 60 minutes in face-to-face evaluation and management of the patient.       Aidan Gallardo, DO

## 2024-11-09 NOTE — H&P
"    [facility] Medicine Services  HISTORY AND PHYSICAL    Patient Name: Hussain Feliz  : 1975  MRN: [id number]  Primary Care Physician: Hilda Beckett PA  Date of admission: 2024      Subjective   Subjective     Chief Complaint:  Syncope    HPI:  Hussain Feliz is a 49 y.o. male brought to our facility via transfer from [facility] for evaluation of syncope.  Patient reportedly has had multiple episodes, patient reports approximately 12 over the last 6 days, where he will become unresponsive.  Patient describes his unresponsiveness as stating \"I feel like I am passing out\" but reports that he will come to and be in a different location or doing a different task.  Patient has never had these issues outside the left 6 days.  Patient denies having recollection of what is occurring.  Patient reportedly has been taking azithromycin, Augmentin, Medrol Dosepak, and as needed albuterol inhaler for recent upper respiratory infection.  Patient denies having any prior heart issues or seizure disorder.  Denies any alleviating factors.  Denies any numbness, tingling, weakness, chest pain, palpitations, diaphoresis, dyspnea.      Personal History     Past Medical History:   Diagnosis Date    Heart burn      Past surgical history: Reviewed, patient denies any surgical history    Family History: family history includes Arthritis in his mother; Hypertension in his mother; Obesity in his mother.     Social History:  reports that he has been smoking cigarettes. He has never used smokeless tobacco. He reports current alcohol use. He reports that he does not use drugs.    Medications:  Available home medication information reviewed.  albuterol sulfate HFA, amoxicillin-clavulanate, azithromycin, and methylPREDNISolone    No Known Allergies    Objective   Objective     Vital Signs:   Temp:  [98.1 °F (36.7 °C)-98.4 °F (36.9 °C)] 98.4 °F (36.9 °C)  Heart Rate:  [63-85] 71  Resp:  " [13-22] 14  BP: (121-141)/(66-91) 135/79       Physical Exam   Constitutional: Awake, alert.  Eyes: PERRLA, sclerae anicteric, no conjunctival injection  HENT: NCAT, mucous membranes moist  Neck: Supple, no thyromegaly, no lymphadenopathy, trachea midline  Respiratory: Clear to auscultation bilaterally, nonlabored respirations   Cardiovascular: RRR, no murmurs, rubs, or gallops, palpable pedal pulses bilaterally  Gastrointestinal: Positive bowel sounds, soft, nontender, nondistended  Musculoskeletal: No bilateral ankle edema, no clubbing or cyanosis to extremities  Psychiatric: Appropriate affect, cooperative  Neurologic: Oriented x 3, strength symmetric in all extremities, Cranial Nerves grossly intact to confrontation, speech clear.  No pronator drift.  Coordinated finger-nose and heel-to-shin bilaterally.  Skin: No rashes     Result Review:  I have personally reviewed the results from the time of this admission to 11/8/2024 22:15 EST and agree with these findings:  [x]  Laboratory list / accordion  []  Microbiology  [x]  Radiology  [x]  EKG/Telemetry   [x]  Cardiology/Vascular   []  Pathology  [x]  Old records  []  Other:  Most notable findings include: Summarized above      LAB RESULTS:      Lab 11/08/24  1034 11/04/24  1017   WBC 7.04 7.65   HEMOGLOBIN 18.0* 16.3   HEMATOCRIT 51.5* 46.8   PLATELETS 253 211   NEUTROS ABS 3.54 4.29   IMMATURE GRANS (ABS) 0.04 0.02   LYMPHS ABS 2.54 2.73   MONOS ABS 0.53 0.41   EOS ABS 0.35 0.16   MCV 91.5 93.6   CRP <0.30  --    PROCALCITONIN  --  0.05   LACTATE 1.0  --          Lab 11/08/24  1034 11/04/24  1017   SODIUM 143 142   POTASSIUM 4.2 3.8   CHLORIDE 106 109*   CO2 30.3* 26.4   ANION GAP 6.7 6.6   BUN 13 13   CREATININE 1.12 0.97   EGFR 81.0 96.3   GLUCOSE 103* 97   CALCIUM 8.9 8.3*   MAGNESIUM 2.2 2.1         Lab 11/08/24  1034 11/04/24  1017   TOTAL PROTEIN 6.8 6.4   ALBUMIN 4.3 4.1   GLOBULIN 2.5 2.3   ALT (SGPT) 41 29   AST (SGOT) 32 30   BILIRUBIN 0.8 0.6   ALK PHOS  79 72         Lab 11/08/24  1803 11/08/24  1436 11/08/24  1034 11/04/24  1017   PROBNP  --   --  <36.0  --    HSTROP T 9 8 10 <6                 Lab 11/08/24  1519   PH, ARTERIAL 7.408   PCO2, ARTERIAL 40.1   PO2 ART 62.9*   FIO2 21   HCO3 ART 25.3   BASE EXCESS ART 0.6   CARBOXYHEMOGLOBIN 0.9     UA          11/8/2024    15:55   Urinalysis   Specific Gravity, UA <=1.005    Ketones, UA Negative    Blood, UA Negative    Leukocytes, UA Negative    Nitrite, UA Negative        Microbiology Results (last 10 days)       Procedure Component Value - Date/Time    Respiratory Panel PCR w/COVID-19(SARS-CoV-2) [facility]NAEL/YAA/LUCIE/PAD/COR/JOSE In-House, NP Swab in UTM/VTM, 2 HR TAT - Swab, Nasopharynx [[id number]]  (Normal) Collected: 11/08/24 1436    Lab Status: Final result Specimen: Swab from Nasopharynx Updated: 11/08/24 1532     ADENOVIRUS, PCR Not Detected     Coronavirus 229E Not Detected     Coronavirus HKU1 Not Detected     Coronavirus NL63 Not Detected     Coronavirus OC43 Not Detected     COVID19 Not Detected     Human Metapneumovirus Not Detected     Human Rhinovirus/Enterovirus Not Detected     Influenza A PCR Not Detected     Influenza B PCR Not Detected     Parainfluenza Virus 1 Not Detected     Parainfluenza Virus 2 Not Detected     Parainfluenza Virus 3 Not Detected     Parainfluenza Virus 4 Not Detected     RSV, PCR Not Detected     Bordetella pertussis pcr Not Detected     Bordetella parapertussis PCR Not Detected     Chlamydophila pneumoniae PCR Not Detected     Mycoplasma pneumo by PCR Not Detected    Narrative:      In the setting of a positive respiratory panel with a viral infection PLUS a negative procalcitonin without other underlying concern for bacterial infection, consider observing off antibiotics or discontinuation of antibiotics and continue supportive care. If the respiratory panel is positive for atypical bacterial infection (Bordetella pertussis, Chlamydophila pneumoniae, or Mycoplasma pneumoniae),  consider antibiotic de-escalation to target atypical bacterial infection.    Respiratory Panel PCR w/COVID-19(SARS-CoV-2) N[facility]NAEL/YAA/LUCIE/PAD/COR/JOSE In-House, NP Swab in UTM/VTM, 2 HR TAT - Swab, Nasopharynx [[id number]]  (Normal) Collected: 11/04/24 1052    Lab Status: Final result Specimen: Swab from Nasopharynx Updated: 11/04/24 1145     ADENOVIRUS, PCR Not Detected     Coronavirus 229E Not Detected     Coronavirus HKU1 Not Detected     Coronavirus NL63 Not Detected     Coronavirus OC43 Not Detected     COVID19 Not Detected     Human Metapneumovirus Not Detected     Human Rhinovirus/Enterovirus Not Detected     Influenza A PCR Not Detected     Influenza B PCR Not Detected     Parainfluenza Virus 1 Not Detected     Parainfluenza Virus 2 Not Detected     Parainfluenza Virus 3 Not Detected     Parainfluenza Virus 4 Not Detected     RSV, PCR Not Detected     Bordetella pertussis pcr Not Detected     Bordetella parapertussis PCR Not Detected     Chlamydophila pneumoniae PCR Not Detected     Mycoplasma pneumo by PCR Not Detected    Narrative:      In the setting of a positive respiratory panel with a viral infection PLUS a negative procalcitonin without other underlying concern for bacterial infection, consider observing off antibiotics or discontinuation of antibiotics and continue supportive care. If the respiratory panel is positive for atypical bacterial infection (Bordetella pertussis, Chlamydophila pneumoniae, or Mycoplasma pneumoniae), consider antibiotic de-escalation to target atypical bacterial infection.            MRI Brain Without Contrast    Result Date: 11/8/2024  MRI BRAIN WO CONTRAST Date of Exam: 11/8/2024 3:13 PM EST Indication: syncope.  Comparison: CT same day Technique:  Routine multiplanar/multisequence sequence images of the brain were obtained without contrast administration. Findings: Diffusion-weighted imaging demonstrates no acute restriction abnormality. Midline structures of the brain are  grossly unremarkable in appearance. Pituitary and sella structures and craniocervical junction are grossly unremarkable in their appearance. The ventricles, cisterns and sulci appear within normal limits. No acute intracranial hemorrhage or mass effect is noted. Gray and white matter signal characteristics appear within normal limits. Major intracranial flow voids appear grossly patent. Please see CT angiogram same day for further evaluation. Mild mucosal thickening of the paranasal sinuses noted. Globes and orbits appear grossly unremarkable in their appearance. Mastoid air cells appear to be grossly clear. Cerebral pontine angle structures and inner ear structures appear grossly unremarkable in appearance.     Impression: Impression: No acute intracranial abnormality Electronically Signed: Cristian Richards MD  11/8/2024 3:51 PM EST  Workstation ID: [id number]    Treadmill Stress Test    Result Date: 11/8/2024    Pt denied chest pain/discomfort during exercise. Pt did c/o SOA (SP02 WNL on RA) that resolved in recovery.   THR of 146 achieved at 8:40.   Expected exercise duration = 10:40. Actual exercise duration = 8:50. Pt stopped d/t leg fatigue.   DTS of 15.8.   SR-ST with rare PVC in recovery.   No ST-T wave changes noted.   No ECG evidence of myocardial ischemia.   Negative clinical evidence of myocardial ischemia.   Findings consistent with a normal ECG stress test.     CT Angiogram Neck    Result Date: 11/8/2024  CT ANGIOGRAM NECK, CT ANGIOGRAM HEAD Date of Exam: 11/8/2024 12:54 PM EST Indication: syncope. Comparison: CT head from earlier today Technique: CTA of the head and neck was performed before and after the uneventful intravenous administration of 75 mL Isovue-370. Reconstructed coronal and sagittal images were also obtained. In addition, a 3-D volume rendered image was created for interpretation. Automated exposure control and iterative reconstruction methods were used. Findings: There is a three-vessel  aortic arch. The right common carotid artery is widely patent with mild plaque distally. The right carotid bifurcation is widely patent. The right internal carotid artery is widely patent. The left common carotid artery is widely patent. The left carotid bifurcation is widely patent. The left internal carotid artery is widely patent. The right vertebral artery is widely patent. The left vertebral artery is widely patent. The left vertebral artery is dominant. The bilateral middle cerebral, bilateral anterior cerebral, and anterior communicating arteries are widely patent. The basilar and bilateral posterior cerebral arteries are widely patent. There are patent bilateral posterior communicating arteries. No intracranial aneurysm is identified. The visualized portions of the bilateral superior cerebellar, anteroinferior cerebellar, and posterior inferior cerebellar arteries are unremarkable.     Impression: Impression: Major arterial vasculature within head and neck appears widely patent, with no hemodynamically significant stenosis, dissection, thrombus, or aneurysm. Electronically Signed: Luis Dennis MD  11/8/2024 1:29 PM EST  Workstation ID: [id number]    CT Angiogram Head    Result Date: 11/8/2024  CT ANGIOGRAM NECK, CT ANGIOGRAM HEAD Date of Exam: 11/8/2024 12:54 PM EST Indication: syncope. Comparison: CT head from earlier today Technique: CTA of the head and neck was performed before and after the uneventful intravenous administration of 75 mL Isovue-370. Reconstructed coronal and sagittal images were also obtained. In addition, a 3-D volume rendered image was created for interpretation. Automated exposure control and iterative reconstruction methods were used. Findings: There is a three-vessel aortic arch. The right common carotid artery is widely patent with mild plaque distally. The right carotid bifurcation is widely patent. The right internal carotid artery is widely patent. The left common carotid  artery is widely patent. The left carotid bifurcation is widely patent. The left internal carotid artery is widely patent. The right vertebral artery is widely patent. The left vertebral artery is widely patent. The left vertebral artery is dominant. The bilateral middle cerebral, bilateral anterior cerebral, and anterior communicating arteries are widely patent. The basilar and bilateral posterior cerebral arteries are widely patent. There are patent bilateral posterior communicating arteries. No intracranial aneurysm is identified. The visualized portions of the bilateral superior cerebellar, anteroinferior cerebellar, and posterior inferior cerebellar arteries are unremarkable.     Impression: Impression: Major arterial vasculature within head and neck appears widely patent, with no hemodynamically significant stenosis, dissection, thrombus, or aneurysm. Electronically Signed: Luis Dennis MD  11/8/2024 1:29 PM EST  Workstation ID: [id number]    CT Head Without Contrast    Result Date: 11/8/2024  CT HEAD WO CONTRAST Date of Exam: 11/8/2024 12:54 PM EST Indication: syncope. Comparison: None available. Technique: Axial CT images were obtained of the head without contrast administration.  Automated exposure control and iterative construction methods were used. Findings: No acute intracranial hemorrhage or extra-axial collection is identified. The ventricles appear normal in caliber, with no evidence of mass effect or midline shift. The basal cisterns appear patent. The gray-white differentiation appears preserved. The calvarium appear intact. The paranasal sinuses are clear. The mastoid air cells are well-aerated.     Impression: Impression: No acute intracranial process identified. Electronically Signed: Luis Dennis MD  11/8/2024 1:18 PM EST  Workstation ID: [id number]    CT Angiogram Chest    Result Date: 11/8/2024  CT ANGIOGRAM CHEST Date of Exam: 11/8/2024 11:15 AM EST Indication: syncope, concern for PE.  Comparison: Chest x-ray from today Technique: CTA of the chest was performed after the uneventful intravenous administration of 85 mL Isovue-370. Reconstructed coronal and sagittal images were also obtained. In addition, a 3-D volume rendered image was created for interpretation. Automated exposure control and iterative reconstruction methods were used. FINDINGS: Thoracic inlet: Unremarkable. Pulmonary arteries: No filling defects are identified within the pulmonary arteries to suggest acute pulmonary embolism. Great vessels: The thoracic aorta and proximal arch vessels appear unremarkable. Mediastinum/Katie: No pathologically enlarged mediastinal lymph nodes are seen. The esophagus is unremarkable. Lung parenchyma: Left upper lobe granuloma. Lungs appear otherwise adequately aerated. No acute infiltrate is seen. No suspicious pulmonary nodules are seen. Trachea and airways: The trachea and central airways appear unremarkable. Pleural space: No significant pleural effusion or pneumothorax. Heart and pericardium: The heart and pericardium appear unremarkable. Chest wall: No acute or suspicious osseous or soft tissue lesion is identified. Upper abdomen: No acute abnormality is identified within the visualized upper abdomen. There are several subcentimeter liver hypodensities which are too small to characterize.     Impression: 1.No acute abnormality is identified within the thorax. Specifically, there is no evidence of acute pulmonary embolism. 2.Please see above for additional details. Electronically Signed: Cas Orozco MD  11/8/2024 11:35 AM EST  Workstation ID: [id number]    XR Chest 1 View    Result Date: 11/8/2024  XR CHEST 1 VW Date of Exam: 11/8/2024 10:54 AM EST Indication: SOA triage protocol Comparison: 11/4/2024 Findings: Low lung volumes. Heart size and pulmonary vasculature are within normal limits. Mild atelectasis and vascular crowding in the lung bases due to low lung volumes. No suspicious infiltrate  or edema. Calcified granuloma in the peripheral left midlung. Costophrenic angle sharp     Impression: Impression: Hypoinspiratory film demonstrating no acute cardiopulmonary process Electronically Signed: Ranjith Garcia  11/8/2024 11:08 AM EST  Workstation ID: [id number]     Results for orders placed during the hospital encounter of 11/08/24    Adult Transthoracic Echo Complete w/ Color, Spectral and Contrast (If Necessary Per Protocol)    Interpretation Summary    Left ventricular systolic function is normal. Calculated left ventricular 3D EF = 63% Left ventricular ejection fraction appears to be 61 - 65%.    Trace mitral valve regurgitation is present.    Normal left atrial size and volume noted.      Assessment & Plan   Assessment & Plan     Syncope  Transient amnesia  Unclear etiology.  MRI of the brain shows no acute intracranial abnormality.  Exercise stress test was negative.  2D echo was unremarkable.  No reported arrhythmias.  EKGs have been remarkably normal.  Check EEG.  Neurology and cardiology consults in a.m., as patient was transferred to our facility for higher level of care, including consultations by these providers.  Recent upper respiratory infection  Patient has completed antibiotics.  No repeat infectious-like symptoms.  Tessalon Perles for symptom relief of cough, likely postviral, can exist for 4 to 6 weeks following acute infection.  Upper respiratory viral panel negative.  GERD  Home meds    Total time spent: 60 minutes  Time spent includes time reviewing chart, face-to-face time, counseling patient/family/caregiver, ordering medications/tests/procedures, communicating with other health care professionals, documenting clinical information in the electronic health record, and coordination of care.      VTE Prophylaxis:  Mechanical VTE prophylaxis orders are present.    CODE STATUS:    Code Status and Medical Interventions: CPR (Attempt to Resuscitate); Full Support   Ordered at: 11/08/24 3066      Level Of Support Discussed With:    Patient     Code Status (Patient has no pulse and is not breathing):    CPR (Attempt to Resuscitate)     Medical Interventions (Patient has pulse or is breathing):    Full Support     Expected Discharge   Expected Discharge Date: 11/9/2024; Expected Discharge Time:      Zack Cornelius DO  11/08/24

## 2024-11-09 NOTE — PROGRESS NOTES
[facility] Medicine Services  PROGRESS NOTE    Patient Name: Hussain Feliz  : 1975  MRN: [id number]    Date of Admission: 2024  Primary Care Physician: Hilda Beckett PA    Subjective   Subjective     CC:  syncope    HPI:  Pt doing well. Still with significant cough.  States some episodes occurred after coughing spell.  Some episodes involves episodes of disorientation.   No episodes since admission.      Objective   Objective     Vital Signs:   Temp:  [97.7 °F (36.5 °C)-98.5 °F (36.9 °C)] 98.5 °F (36.9 °C)  Heart Rate:  [53-88] 57  Resp:  [13-18] 18  BP: (114-141)/(72-89) 131/83     Physical Exam:  Constitutional: No acute distress, awake, alert  HENT: NCAT, mucous membranes moist  Respiratory: Clear to auscultation bilaterally, respiratory effort normal   Cardiovascular: bradycardic, no murmurs, rubs, or gallops  Gastrointestinal: Positive bowel sounds, soft, nontender, nondistended  Musculoskeletal: No bilateral ankle edema  Psychiatric: Appropriate affect, cooperative  Neurologic: Oriented x 3, strength symmetric in all extremities, Cranial Nerves grossly intact to confrontation, speech clear  Skin: No rashes    Bradycardic, sinus on tele      Results Reviewed:  LAB RESULTS:      Lab 24  0505 24  1803 24  1436 24  1034 24  1017   WBC 7.25  --   --  7.04 7.65   HEMOGLOBIN 16.7  --   --  18.0* 16.3   HEMATOCRIT 47.4  --   --  51.5* 46.8   PLATELETS 232  --   --  253 211   NEUTROS ABS  --   --   --  3.54 4.29   IMMATURE GRANS (ABS)  --   --   --  0.04 0.02   LYMPHS ABS  --   --   --  2.54 2.73   MONOS ABS  --   --   --  0.53 0.41   EOS ABS  --   --   --  0.35 0.16   MCV 92.0  --   --  91.5 93.6   CRP  --   --   --  <0.30  --    PROCALCITONIN  --   --   --   --  0.05   LACTATE  --   --   --  1.0  --    HSTROP T 10 9 8 10 <6         Lab 24  0505 24  1034 24  1017   SODIUM 143 143 142   POTASSIUM 3.8 4.2 3.8   CHLORIDE 110*  106 109*   CO2 22.0 30.3* 26.4   ANION GAP 11.0 6.7 6.6   BUN 13 13 13   CREATININE 0.97 1.12 0.97   EGFR 96.3 81.0 96.3   GLUCOSE 106* 103* 97   CALCIUM 8.3* 8.9 8.3*   MAGNESIUM  --  2.2 2.1         Lab 11/09/24  0505 11/08/24  1034 11/04/24  1017   TOTAL PROTEIN 6.2 6.8 6.4   ALBUMIN 3.8 4.3 4.1   GLOBULIN 2.4 2.5 2.3   ALT (SGPT) 38 41 29   AST (SGOT) 26 32 30   BILIRUBIN 0.7 0.8 0.6   ALK PHOS 75 79 72         Lab 11/09/24  0505 11/08/24  1803 11/08/24  1436 11/08/24  1034 11/04/24  1017   PROBNP  --   --   --  <36.0  --    HSTROP T 10 9 8 10 <6             Lab 11/09/24  0505   VITAMIN B 12 582         Lab 11/08/24  1519   PH, ARTERIAL 7.408   PCO2, ARTERIAL 40.1   PO2 ART 62.9*   FIO2 21   HCO3 ART 25.3   BASE EXCESS ART 0.6   CARBOXYHEMOGLOBIN 0.9     Brief Urine Lab Results  (Last result in the past 365 days)        Color   Clarity   Blood   Leuk Est   Nitrite   Protein   CREAT   Urine HCG        11/08/24 1555 Yellow   Clear   Negative   Negative   Negative   Negative                   Microbiology Results Abnormal       None            MRI Brain Without Contrast    Result Date: 11/8/2024  MRI BRAIN WO CONTRAST Date of Exam: 11/8/2024 3:13 PM EST Indication: syncope.  Comparison: CT same day Technique:  Routine multiplanar/multisequence sequence images of the brain were obtained without contrast administration. Findings: Diffusion-weighted imaging demonstrates no acute restriction abnormality. Midline structures of the brain are grossly unremarkable in appearance. Pituitary and sella structures and craniocervical junction are grossly unremarkable in their appearance. The ventricles, cisterns and sulci appear within normal limits. No acute intracranial hemorrhage or mass effect is noted. Gray and white matter signal characteristics appear within normal limits. Major intracranial flow voids appear grossly patent. Please see CT angiogram same day for further evaluation. Mild mucosal thickening of the paranasal  sinuses noted. Globes and orbits appear grossly unremarkable in their appearance. Mastoid air cells appear to be grossly clear. Cerebral pontine angle structures and inner ear structures appear grossly unremarkable in appearance.     Impression: Impression: No acute intracranial abnormality Electronically Signed: Cristian Richards MD  11/8/2024 3:51 PM EST  Workstation ID: [id number]    Treadmill Stress Test    Result Date: 11/8/2024    Pt denied chest pain/discomfort during exercise. Pt did c/o SOA (SP02 WNL on RA) that resolved in recovery.   THR of 146 achieved at 8:40.   Expected exercise duration = 10:40. Actual exercise duration = 8:50. Pt stopped d/t leg fatigue.   DTS of 15.8.   SR-ST with rare PVC in recovery.   No ST-T wave changes noted.   No ECG evidence of myocardial ischemia.   Negative clinical evidence of myocardial ischemia.   Findings consistent with a normal ECG stress test.     CT Angiogram Neck    Result Date: 11/8/2024  CT ANGIOGRAM NECK, CT ANGIOGRAM HEAD Date of Exam: 11/8/2024 12:54 PM EST Indication: syncope. Comparison: CT head from earlier today Technique: CTA of the head and neck was performed before and after the uneventful intravenous administration of 75 mL Isovue-370. Reconstructed coronal and sagittal images were also obtained. In addition, a 3-D volume rendered image was created for interpretation. Automated exposure control and iterative reconstruction methods were used. Findings: There is a three-vessel aortic arch. The right common carotid artery is widely patent with mild plaque distally. The right carotid bifurcation is widely patent. The right internal carotid artery is widely patent. The left common carotid artery is widely patent. The left carotid bifurcation is widely patent. The left internal carotid artery is widely patent. The right vertebral artery is widely patent. The left vertebral artery is widely patent. The left vertebral artery is dominant. The bilateral middle  cerebral, bilateral anterior cerebral, and anterior communicating arteries are widely patent. The basilar and bilateral posterior cerebral arteries are widely patent. There are patent bilateral posterior communicating arteries. No intracranial aneurysm is identified. The visualized portions of the bilateral superior cerebellar, anteroinferior cerebellar, and posterior inferior cerebellar arteries are unremarkable.     Impression: Impression: Major arterial vasculature within head and neck appears widely patent, with no hemodynamically significant stenosis, dissection, thrombus, or aneurysm. Electronically Signed: Luis Dennis MD  11/8/2024 1:29 PM EST  Workstation ID: [id number]    CT Angiogram Head    Result Date: 11/8/2024  CT ANGIOGRAM NECK, CT ANGIOGRAM HEAD Date of Exam: 11/8/2024 12:54 PM EST Indication: syncope. Comparison: CT head from earlier today Technique: CTA of the head and neck was performed before and after the uneventful intravenous administration of 75 mL Isovue-370. Reconstructed coronal and sagittal images were also obtained. In addition, a 3-D volume rendered image was created for interpretation. Automated exposure control and iterative reconstruction methods were used. Findings: There is a three-vessel aortic arch. The right common carotid artery is widely patent with mild plaque distally. The right carotid bifurcation is widely patent. The right internal carotid artery is widely patent. The left common carotid artery is widely patent. The left carotid bifurcation is widely patent. The left internal carotid artery is widely patent. The right vertebral artery is widely patent. The left vertebral artery is widely patent. The left vertebral artery is dominant. The bilateral middle cerebral, bilateral anterior cerebral, and anterior communicating arteries are widely patent. The basilar and bilateral posterior cerebral arteries are widely patent. There are patent bilateral posterior communicating  arteries. No intracranial aneurysm is identified. The visualized portions of the bilateral superior cerebellar, anteroinferior cerebellar, and posterior inferior cerebellar arteries are unremarkable.     Impression: Impression: Major arterial vasculature within head and neck appears widely patent, with no hemodynamically significant stenosis, dissection, thrombus, or aneurysm. Electronically Signed: Luis Dennis MD  11/8/2024 1:29 PM EST  Workstation ID: [id number]    CT Head Without Contrast    Result Date: 11/8/2024  CT HEAD WO CONTRAST Date of Exam: 11/8/2024 12:54 PM EST Indication: syncope. Comparison: None available. Technique: Axial CT images were obtained of the head without contrast administration.  Automated exposure control and iterative construction methods were used. Findings: No acute intracranial hemorrhage or extra-axial collection is identified. The ventricles appear normal in caliber, with no evidence of mass effect or midline shift. The basal cisterns appear patent. The gray-white differentiation appears preserved. The calvarium appear intact. The paranasal sinuses are clear. The mastoid air cells are well-aerated.     Impression: Impression: No acute intracranial process identified. Electronically Signed: Luis Dennis MD  11/8/2024 1:18 PM EST  Workstation ID: [id number]    CT Angiogram Chest    Result Date: 11/8/2024  CT ANGIOGRAM CHEST Date of Exam: 11/8/2024 11:15 AM EST Indication: syncope, concern for PE. Comparison: Chest x-ray from today Technique: CTA of the chest was performed after the uneventful intravenous administration of 85 mL Isovue-370. Reconstructed coronal and sagittal images were also obtained. In addition, a 3-D volume rendered image was created for interpretation. Automated exposure control and iterative reconstruction methods were used. FINDINGS: Thoracic inlet: Unremarkable. Pulmonary arteries: No filling defects are identified within the pulmonary arteries to suggest  acute pulmonary embolism. Great vessels: The thoracic aorta and proximal arch vessels appear unremarkable. Mediastinum/Katie: No pathologically enlarged mediastinal lymph nodes are seen. The esophagus is unremarkable. Lung parenchyma: Left upper lobe granuloma. Lungs appear otherwise adequately aerated. No acute infiltrate is seen. No suspicious pulmonary nodules are seen. Trachea and airways: The trachea and central airways appear unremarkable. Pleural space: No significant pleural effusion or pneumothorax. Heart and pericardium: The heart and pericardium appear unremarkable. Chest wall: No acute or suspicious osseous or soft tissue lesion is identified. Upper abdomen: No acute abnormality is identified within the visualized upper abdomen. There are several subcentimeter liver hypodensities which are too small to characterize.     Impression: 1.No acute abnormality is identified within the thorax. Specifically, there is no evidence of acute pulmonary embolism. 2.Please see above for additional details. Electronically Signed: Cas Orozco MD  11/8/2024 11:35 AM EST  Workstation ID: [id number]    XR Chest 1 View    Result Date: 11/8/2024  XR CHEST 1 VW Date of Exam: 11/8/2024 10:54 AM EST Indication: SOA triage protocol Comparison: 11/4/2024 Findings: Low lung volumes. Heart size and pulmonary vasculature are within normal limits. Mild atelectasis and vascular crowding in the lung bases due to low lung volumes. No suspicious infiltrate or edema. Calcified granuloma in the peripheral left midlung. Costophrenic angle sharp     Impression: Impression: Hypoinspiratory film demonstrating no acute cardiopulmonary process Electronically Signed: Ranjith Garcia  11/8/2024 11:08 AM EST  Workstation ID: [id number]     Results for orders placed during the hospital encounter of 11/08/24    Adult Transthoracic Echo Complete w/ Color, Spectral and Contrast (If Necessary Per Protocol)    Interpretation Summary    Left ventricular  systolic function is normal. Calculated left ventricular 3D EF = 63% Left ventricular ejection fraction appears to be 61 - 65%.    Trace mitral valve regurgitation is present.    Normal left atrial size and volume noted.      Current medications:  Scheduled Meds:nicotine, 1 patch, Transdermal, Q24H  pantoprazole, 40 mg, Oral, Q AM  sodium chloride, 10 mL, Intravenous, Q12H      Continuous Infusions:   PRN Meds:.  acetaminophen    benzonatate    promethazine-codeine    sodium chloride    sodium chloride    sodium chloride    Assessment & Plan   Assessment & Plan     Active Hospital Problems    Diagnosis  POA    **Syncope [R55]  Yes    Transient amnesia [R41.3]  Yes      Resolved Hospital Problems   No resolved problems to display.        Brief Hospital Course to date:  Hussain Feliz is a 49 y.o. male with no significant PMHx presents with episodes of syncope and disorientation in the setting of a viral infection/bronchitis being treated with abx and steroids    Syncope  --seems to be possible vasovagal--as many episodes are posttussive.  --cardiology recommending 14 day monitor with cardiology follow up  --Neurology consult pending--obtaining EEG  --?side effect from steroids  --treat cough with some phenergan/codeine cough medicine      Expected Discharge Location and Transportation: home  Expected Discharge   Expected Discharge Date: 11/10/2024; Expected Discharge Time:      VTE Prophylaxis:  Mechanical VTE prophylaxis orders are present.         AM-PAC 6 Clicks Score (PT): 24 (11/08/24 2052)    CODE STATUS:   Code Status and Medical Interventions: CPR (Attempt to Resuscitate); Full Support   Ordered at: 11/08/24 1219     Level Of Support Discussed With:    Patient     Code Status (Patient has no pulse and is not breathing):    CPR (Attempt to Resuscitate)     Medical Interventions (Patient has pulse or is breathing):    Full Support       Maryuri Mccoy MD  11/09/24

## 2024-11-09 NOTE — PLAN OF CARE
"Goal Outcome Evaluation:  Plan of Care Reviewed With: patient        Progress: no change  Outcome Evaluation: Patient arrived this shift. C/o headache after \"coughing fit\". Patient noted to have non productive cough. Provider notified of headache. Patient requesting Ibuprofen. One time dose ibuprofen 600mg PO and PRN tylenol ordered. Both ibuprofen and tylenol given together per provider order.                             "

## 2024-11-09 NOTE — NURSING NOTE
EEG tech Rickie) at bedside to perform skin check prior to cEEG.  No signs of redness, swelling or open skin. Jerad is 22.  Patient is aox3. Event button is hanging over the top of the EEG monitor. Wife at bedside and will press if patient has an event.  Patient is aware to limit movement and use bedside commode if needed.  He will notify the RN if he has any pain or discomfort from head wrap or electrodes.

## 2024-11-09 NOTE — CONSULTS
Referring Provider: Hospitalist  Reason for Consultation: Amnesia     Patient Care Team:  Hilda Beckett PA as PCP - General (Physician Assistant)  Kareen Singh, RN as Ambulatory  ([facility])    Chief complaint amnesia    Subjective .     History of present illness: This is a 49-year-old male who had a syncopal episode.  Per report patient, responsive had to sit back recliner.  Patient also had episodes where he would walk around just not know how he ended up in certain places.  Patient denies any chest pain pressure discomfort.  No shortness of breath.  No heart racing episodes.  Patient denies a history of cardiac testing or cardiac evaluation.  Patient yesterday underwent stress test without significant abnormalities noted also echocardiogram that was relatively benign also.  Patient's telemetry and EKGs here have also been unremarkable.  Cardiology is consulted for further evaluation of above-mentioned issues    Review of Systems   Pertinent items are noted in HPI, all other systems reviewed and negative    History  Past Medical History:   Diagnosis Date    Heart burn    , History reviewed. No pertinent surgical history.,   Family History   Problem Relation Age of Onset    Hypertension Mother     Obesity Mother     Arthritis Mother    ,   Social History     Socioeconomic History    Marital status:    Tobacco Use    Smoking status: Some Days     Current packs/day: 1.00     Types: Cigarettes    Smokeless tobacco: Never    Tobacco comments:     1 pack per week   Vaping Use    Vaping status: Never Used   Substance and Sexual Activity    Alcohol use: Yes     Comment: maybe one drink week    Drug use: Never    Sexual activity: Defer     E-cigarette/Vaping    E-cigarette/Vaping Use Never User      E-cigarette/Vaping Substances     E-cigarette/Vaping Devices         ,   Medications Prior to Admission   Medication Sig Dispense Refill Last Dose/Taking    albuterol sulfate  (90 Base)  MCG/ACT inhaler Inhale 2 puffs Every 4 (Four) Hours As Needed for Wheezing. 8.5 g 0 11/7/2024    amoxicillin-clavulanate (AUGMENTIN) 875-125 MG per tablet Take 1 tablet by mouth 2 (Two) Times a Day for 5 days. 10 tablet 0 11/8/2024    azithromycin (Zithromax Z-Chalo) 250 MG tablet Take 2 tablets by mouth on day 1, then 1 tablet daily on days 2-5 6 tablet 0     methylPREDNISolone (MEDROL) 4 MG dose pack Take as directed on package instructions. 21 tablet 0    , Scheduled Meds:  amoxicillin-clavulanate, 1 tablet, Oral, BID  nicotine, 1 patch, Transdermal, Q24H  pantoprazole, 40 mg, Oral, Q AM  sodium chloride, 10 mL, Intravenous, Q12H   , Continuous Infusions:   , PRN Meds:    acetaminophen    sodium chloride    sodium chloride    sodium chloride, and Allergies:  Patient has no known allergies.    Objective     Vital Signs  Temp:  [97.7 °F (36.5 °C)-98.4 °F (36.9 °C)] 97.8 °F (36.6 °C)  Heart Rate:  [53-88] 58  Resp:  [13-22] 18  BP: (114-141)/(66-91) 121/78  Body mass index is 34.46 kg/m².  No intake or output data in the 24 hours ending 11/09/24 0717  No intake/output data recorded.    Physical Exam:      General Appearance:  Alert, cooperative, in no acute distress   Head:  Normocephalic, without obvious abnormality, atraumatic   Eyes:  Lids and lashes normal, conjunctivae and sclerae normal, no icterus, no pallor, corneas clear, PERRLA   Ears:  Ears appear intact with no abnormalities noted   Throat:  No oral lesions, no thrush, oral mucosa moist   Neck:  No adenopathy, supple, trachea midline, no thyromegaly, no carotid bruit, no JVD   Back:  No kyphosis present, no scoliosis present, no skin lesions, erythema or scars, no tenderness to percussion or palpation, range of motion normal   Lungs:  Clear to auscultation, respirations regular, even and unlabored    Heart:  Regular rhythm and normal rate, normal S1 and S2, no murmur, no gallop, no rub, no click   Chest Wall:  No abnormalities observed   Abdomen:  Normal  "bowel sounds, no masses, no organomegaly, soft non-tender, non-distended, no guarding, no rebound tenderness   Rectal:  Deferred   Extremities:  Moves all extremities well, no edema, no cyanosis, no redness   Pulses:  Pulses palpable and equal bilaterally   Skin:  No bleeding, bruising or rash   Lymph nodes:  No palpable adenopathy   Neurologic:  Cranial nerves 2 - 12 grossly intact, sensation intact, DTR present and equal bilaterally                                                                               Results Review:    I reviewed the patient's new clinical results.  I reviewed the patient's new imaging results and agree with the interpretation.  I reviewed the patient's other test results and agree with the interpretation  I personally viewed and interpreted the patient's EKG/Telemetry data       WBC WBC   Date Value Ref Range Status   11/09/2024 7.25 3.40 - 10.80 10*3/mm3 Final   11/08/2024 7.04 3.40 - 10.80 10*3/mm3 Final      HGB Hemoglobin   Date Value Ref Range Status   11/09/2024 16.7 13.0 - 17.7 g/dL Final   11/08/2024 18.0 (H) 13.0 - 17.7 g/dL Final      HCT Hematocrit   Date Value Ref Range Status   11/09/2024 47.4 37.5 - 51.0 % Final   11/08/2024 51.5 (H) 37.5 - 51.0 % Final      Platlets No results found for: \"LABPLAT\"     PT/INR:    No results found for: \"PROTIME\"/No results found for: \"INR\"    Sodium Sodium   Date Value Ref Range Status   11/09/2024 143 136 - 145 mmol/L Final   11/08/2024 143 136 - 145 mmol/L Final      Potassium Potassium   Date Value Ref Range Status   11/09/2024 3.8 3.5 - 5.2 mmol/L Final   11/08/2024 4.2 3.5 - 5.2 mmol/L Final      Chloride Chloride   Date Value Ref Range Status   11/09/2024 110 (H) 98 - 107 mmol/L Final   11/08/2024 106 98 - 107 mmol/L Final      Bicarbonate No results found for: \"PLASMABICARB\"   BUN BUN   Date Value Ref Range Status   11/09/2024 13 6 - 20 mg/dL Final   11/08/2024 13 6 - 20 mg/dL Final      Creatinine Creatinine   Date Value Ref Range " Status   11/09/2024 0.97 0.76 - 1.27 mg/dL Final   11/08/2024 1.12 0.76 - 1.27 mg/dL Final      Calcium Calcium   Date Value Ref Range Status   11/09/2024 8.3 (L) 8.6 - 10.5 mg/dL Final   11/08/2024 8.9 8.6 - 10.5 mg/dL Final      Magnesium Magnesium   Date Value Ref Range Status   11/08/2024 2.2 1.6 - 2.6 mg/dL Final        pH pH, Arterial   Date Value Ref Range Status   11/08/2024 7.408 7.350 - 7.450 pH units Final      pO2 pO2, Arterial   Date Value Ref Range Status   11/08/2024 62.9 (L) 83.0 - 108.0 mm Hg Final     Comment:     84 Value below reference range      pCO2 pCO2, Arterial   Date Value Ref Range Status   11/08/2024 40.1 35.0 - 45.0 mm Hg Final      HCO3 HCO3, Arterial   Date Value Ref Range Status   11/08/2024 25.3 20.0 - 26.0 mmol/L Final        Assessment & Plan     Patient Active Problem List   Diagnosis Code    Abnormal blood level of iron R79.0    Syncope R55    Transient amnesia R41.3       Syncope  Amnesia  Obesity    Plan: Can discharge in any point from cardiac perspective.  Patient will 14-day monitor placed.  Can follow-up thereafter in 6 to 8 weeks.  Await neurology recommendations.  Believe less likely patient having cardiac syncope given presentation, normal EKG, normal telemetry        Daron Mccarthy MD  11/09/24  07:17 EST

## 2024-11-10 ENCOUNTER — APPOINTMENT (OUTPATIENT)
Dept: NEUROLOGY | Facility: HOSPITAL | Age: 49
End: 2024-11-10
Payer: COMMERCIAL

## 2024-11-10 LAB
QT INTERVAL: 364 MS
QT INTERVAL: 398 MS

## 2024-11-10 PROCEDURE — 94799 UNLISTED PULMONARY SVC/PX: CPT

## 2024-11-10 PROCEDURE — 95720 EEG PHY/QHP EA INCR W/VEEG: CPT | Performed by: PSYCHIATRY & NEUROLOGY

## 2024-11-10 PROCEDURE — 95714 VEEG EA 12-26 HR UNMNTR: CPT

## 2024-11-10 PROCEDURE — G0378 HOSPITAL OBSERVATION PER HR: HCPCS

## 2024-11-10 PROCEDURE — 99232 SBSQ HOSP IP/OBS MODERATE 35: CPT | Performed by: NURSE PRACTITIONER

## 2024-11-10 PROCEDURE — 94664 DEMO&/EVAL PT USE INHALER: CPT

## 2024-11-10 PROCEDURE — 94640 AIRWAY INHALATION TREATMENT: CPT

## 2024-11-10 PROCEDURE — 99214 OFFICE O/P EST MOD 30 MIN: CPT | Performed by: STUDENT IN AN ORGANIZED HEALTH CARE EDUCATION/TRAINING PROGRAM

## 2024-11-10 PROCEDURE — 99232 SBSQ HOSP IP/OBS MODERATE 35: CPT | Performed by: PEDIATRICS

## 2024-11-10 PROCEDURE — 94761 N-INVAS EAR/PLS OXIMETRY MLT: CPT

## 2024-11-10 RX ORDER — IPRATROPIUM BROMIDE AND ALBUTEROL SULFATE 2.5; .5 MG/3ML; MG/3ML
3 SOLUTION RESPIRATORY (INHALATION)
Status: DISCONTINUED | OUTPATIENT
Start: 2024-11-10 | End: 2024-11-12 | Stop reason: HOSPADM

## 2024-11-10 RX ADMIN — IPRATROPIUM BROMIDE AND ALBUTEROL SULFATE 3 ML: 2.5; .5 SOLUTION RESPIRATORY (INHALATION) at 16:41

## 2024-11-10 RX ADMIN — IPRATROPIUM BROMIDE AND ALBUTEROL SULFATE 3 ML: 2.5; .5 SOLUTION RESPIRATORY (INHALATION) at 11:44

## 2024-11-10 RX ADMIN — Medication 10 ML: at 08:15

## 2024-11-10 RX ADMIN — IPRATROPIUM BROMIDE AND ALBUTEROL SULFATE 3 ML: 2.5; .5 SOLUTION RESPIRATORY (INHALATION) at 21:11

## 2024-11-10 RX ADMIN — PROMETHAZINE HYDROCHLORIDE AND CODEINE PHOSPHATE 5 ML: 6.25; 1 SOLUTION ORAL at 23:23

## 2024-11-10 RX ADMIN — PANTOPRAZOLE SODIUM 40 MG: 40 TABLET, DELAYED RELEASE ORAL at 08:14

## 2024-11-10 NOTE — PROGRESS NOTES
Cardiology Progress Note      Reason for visit:    Syncope    IDENTIFICATION: 49-year-old gentleman who resides in [place], [place]    Active Hospital Problems    Diagnosis  POA    **Syncope [R55]  Yes     Priority: High     Echo (11/8/2024): LVEF 63%.  No significant valvular abnormality   Treadmill stress test (11/8/2024): No ischemia.  Rare PVCs in recovery       Transient amnesia [R41.3]  Yes            No events noted overnight.  No chest pain or shortness of breath.  No syncopal episodes           Vital Sign Min/Max for last 24 hours  Temp  Min: 97.9 °F (36.6 °C)  Max: 98.7 °F (37.1 °C)   BP  Min: 130/84  Max: 154/90   Pulse  Min: 57  Max: 77   Resp  Min: 18  Max: 18   SpO2  Min: 94 %  Max: 97 %   No data recorded      Intake/Output Summary (Last 24 hours) at 11/10/2024 0715  Last data filed at 11/9/2024 2250  Gross per 24 hour   Intake --   Output 650 ml   Net -650 ml           Physical Exam  Constitutional:       Appearance: He is well-developed.   HENT:      Head: Normocephalic.   Neck:      Vascular: No carotid bruit or JVD.   Cardiovascular:      Rate and Rhythm: Normal rate and regular rhythm.      Heart sounds: Normal heart sounds. No murmur heard.     No friction rub. No gallop.   Pulmonary:      Effort: Pulmonary effort is normal.      Breath sounds: Normal breath sounds.   Abdominal:      General: Bowel sounds are normal. There is no distension.      Palpations: Abdomen is soft.   Skin:     General: Skin is warm and dry.   Neurological:      Mental Status: He is alert and oriented to person, place, and time.         Tele: Normal sinus rhythm    Results Review (reviewed the patient's recent labs in the electronic medical record):     EKG (11/9/2024): Normal sinus rhythm    CXR (11/8/2024): No acute cardiopulmonary process      MRI of the brain (11/8/2024): No acute intracranial abnormality    ECHO (11/8/2024):  LVEF 63%.  Trace MR.  No significant valvular abnormality    Results from last 7 days    Lab Units 11/09/24  0505 11/08/24  1034 11/04/24  1017   SODIUM mmol/L 143 143 142   POTASSIUM mmol/L 3.8 4.2 3.8   CHLORIDE mmol/L 110* 106 109*   BUN mg/dL 13 13 13   CREATININE mg/dL 0.97 1.12 0.97   MAGNESIUM mg/dL  --  2.2 2.1     Results from last 7 days   Lab Units 11/09/24  0505 11/08/24  1803 11/08/24  1436   HSTROP T ng/L 10 9 8     Results from last 7 days   Lab Units 11/09/24  0505 11/08/24  1034 11/04/24  1017   WBC 10*3/mm3 7.25 7.04 7.65   HEMOGLOBIN g/dL 16.7 18.0* 16.3   HEMATOCRIT % 47.4 51.5* 46.8   PLATELETS 10*3/mm3 232 253 211       Lab Results   Component Value Date    HGBA1C 4.9 08/29/2023       Lab Results   Component Value Date    CHLPL 154 08/29/2023    TRIG 100 08/29/2023    HDL 28 (L) 08/29/2023     (H) 08/29/2023              Syncope  Normal treadmill stress test and echocardiogram  Negative troponins and normal EKG  Low suspicion for cardiac etiology           Okay for discharge from cardiac standpoint.  Recommend 2-week monitor.  If he is discharged today he will return to our office tomorrow for placement.  If he is discharged tomorrow our office will apply a monitor prior to his discharge.  Nursing should contact our office tomorrow when needed  Please call cardiology with any further questions.    Electronically signed by IRENE Brock, 11/10/24, 7:15 AM EST.

## 2024-11-10 NOTE — PROGRESS NOTES
[facility] Medicine Services  PROGRESS NOTE    Patient Name: Hussain Feliz  : 1975  MRN: [id number]    Date of Admission: 2024  Primary Care Physician: Hilda Beckett PA    Subjective   Subjective     CC:  syncope    HPI:  Pt still with coughing.  Wife is really worried about these spells.  Had some coughing spells throughout the day today.  Eating ok.      Objective   Objective     Vital Signs:   Temp:  [97.9 °F (36.6 °C)-98.7 °F (37.1 °C)] 98.2 °F (36.8 °C)  Heart Rate:  [57-80] 80  Resp:  [18] 18  BP: (121-154)/(73-90) 124/73     Physical Exam:  Constitutional: No acute distress, awake, alert  HENT: NCAT, mucous membranes moist  Respiratory: Clear to auscultation bilaterally, respiratory effort normal   Cardiovascular: bradycardic, no murmurs, rubs, or gallops  Gastrointestinal: Positive bowel sounds, soft, nontender, nondistended  Musculoskeletal: No bilateral ankle edema  Psychiatric: Appropriate affect, cooperative  Neurologic: Oriented x 3, strength symmetric in all extremities, Cranial Nerves grossly intact to confrontation, speech clear  Skin: No rashes    Bradycardic, sinus on tele      Results Reviewed:  LAB RESULTS:      Lab 24  0505 24  1803 24  1436 24  1034 24  1017   WBC 7.25  --   --  7.04 7.65   HEMOGLOBIN 16.7  --   --  18.0* 16.3   HEMATOCRIT 47.4  --   --  51.5* 46.8   PLATELETS 232  --   --  253 211   NEUTROS ABS  --   --   --  3.54 4.29   IMMATURE GRANS (ABS)  --   --   --  0.04 0.02   LYMPHS ABS  --   --   --  2.54 2.73   MONOS ABS  --   --   --  0.53 0.41   EOS ABS  --   --   --  0.35 0.16   MCV 92.0  --   --  91.5 93.6   CRP  --   --   --  <0.30  --    PROCALCITONIN  --   --   --   --  0.05   LACTATE  --   --   --  1.0  --    HSTROP T 10 9 8 10 <6         Lab 24  0505 24  1034 24  1017   SODIUM 143 143 142   POTASSIUM 3.8 4.2 3.8   CHLORIDE 110* 106 109*   CO2 22.0 30.3* 26.4   ANION GAP 11.0 6.7  6.6   BUN 13 13 13   CREATININE 0.97 1.12 0.97   EGFR 96.3 81.0 96.3   GLUCOSE 106* 103* 97   CALCIUM 8.3* 8.9 8.3*   MAGNESIUM  --  2.2 2.1         Lab 11/09/24  0505 11/08/24  1034 11/04/24  1017   TOTAL PROTEIN 6.2 6.8 6.4   ALBUMIN 3.8 4.3 4.1   GLOBULIN 2.4 2.5 2.3   ALT (SGPT) 38 41 29   AST (SGOT) 26 32 30   BILIRUBIN 0.7 0.8 0.6   ALK PHOS 75 79 72         Lab 11/09/24  0505 11/08/24  1803 11/08/24  1436 11/08/24  1034 11/04/24  1017   PROBNP  --   --   --  <36.0  --    HSTROP T 10 9 8 10 <6             Lab 11/09/24  0505   VITAMIN B 12 582         Lab 11/08/24  1519   PH, ARTERIAL 7.408   PCO2, ARTERIAL 40.1   PO2 ART 62.9*   FIO2 21   HCO3 ART 25.3   BASE EXCESS ART 0.6   CARBOXYHEMOGLOBIN 0.9     Brief Urine Lab Results  (Last result in the past 365 days)        Color   Clarity   Blood   Leuk Est   Nitrite   Protein   CREAT   Urine HCG        11/08/24 1555 Yellow   Clear   Negative   Negative   Negative   Negative                   Microbiology Results Abnormal       None            EEG Continuous Monitoring With Video    Result Date: 11/10/2024  Date of Procedure: 11-9 at 6:30 PM through 11-10 at 11 AM Reason for referral: 49 y.o.male with recurrent episodes of syncope, altered mental status, consideration of seizures Technical summary: A 19 channel digital EEG is performed using the international 10-20 placement system, including eye leads and EKG leads.  Video is available, and split screen technology with video/EEG correlation is used as needed.   A patient event button is offered. Review of the relevant video-EEG data was performed throughout the day.  Detailed review of the EEG and relevant video was performed using multiple montages, including a variety of referential and bipolar montages.  Results of the monitoring related to the treatment team frequently during the study, at least once a day.  Where available, seizure detection software was used for the detection of ictal and interictal  discharges. Findings: The patient is awake at the beginning of the study.  The background shows diffuse low amplitude theta and alpha activity present symmetrically over both hemispheres.  At times a 10 Hz posterior rhythm is evident over the occipital leads.  EMG and eye blink artifact are present anteriorly.  Overnight, sleep is seen with slowing of the background, and sleep spindles.  In the early morning hours, electrode artifact is present in the occipital leads and this becomes quite prominent in the last hour of the recording.  The patient's head is rewrapped shortly before 11 AM, and the study is again of good technical quality.  Over the course of the study.  No focal features or epileptiform activity are seen.  The patient event button is not pushed.  No clinical or electrographic seizures are seen.     Impression:   Normal EEG in the awake and asleep states No focal features or epileptiform activity are seen No clinical or electrographic seizures are captured This report is transcribed using the Dragon dictation system.     EEG    Result Date: 11/9/2024  Reason for referral: 49 y.o.male recurrent syncope, consideration of seizures Technical Summary:  A 19 channel digital EEG was performed using the international 10-20 placement system, including eye leads and EKG leads. Duration: 20 minutes Findings: The patient is awake.  Diffuse low amplitude intermixed theta and alpha activity is present symmetrically over both hemispheres.  At times the tenderness posterior rhythm is evident.  Photic stimulation is a symmetric driving response.  Hyperventilation is not performed.  Stage II sleep is not seen.  No focal features or epileptiform activity are noted Video: Available Technical quality: Good EKG: Regular, 60 bpm SUMMARY: Normal EEG in the awake state No focal features or epileptiform activity are seen     Impression: Normal study This report is transcribed using the Dragon dictation system.      MRI Brain  Without Contrast    Result Date: 11/8/2024  MRI BRAIN WO CONTRAST Date of Exam: 11/8/2024 3:13 PM EST Indication: syncope.  Comparison: CT same day Technique:  Routine multiplanar/multisequence sequence images of the brain were obtained without contrast administration. Findings: Diffusion-weighted imaging demonstrates no acute restriction abnormality. Midline structures of the brain are grossly unremarkable in appearance. Pituitary and sella structures and craniocervical junction are grossly unremarkable in their appearance. The ventricles, cisterns and sulci appear within normal limits. No acute intracranial hemorrhage or mass effect is noted. Gray and white matter signal characteristics appear within normal limits. Major intracranial flow voids appear grossly patent. Please see CT angiogram same day for further evaluation. Mild mucosal thickening of the paranasal sinuses noted. Globes and orbits appear grossly unremarkable in their appearance. Mastoid air cells appear to be grossly clear. Cerebral pontine angle structures and inner ear structures appear grossly unremarkable in appearance.     Impression: Impression: No acute intracranial abnormality Electronically Signed: Cristian Richards MD  11/8/2024 3:51 PM EST  Workstation ID: [id number]     Results for orders placed during the hospital encounter of 11/08/24    Adult Transthoracic Echo Complete w/ Color, Spectral and Contrast (If Necessary Per Protocol)    Interpretation Summary    Left ventricular systolic function is normal. Calculated left ventricular 3D EF = 63% Left ventricular ejection fraction appears to be 61 - 65%.    Trace mitral valve regurgitation is present.    Normal left atrial size and volume noted.      Current medications:  Scheduled Meds:ipratropium-albuterol, 3 mL, Nebulization, 4x Daily - RT  nicotine, 1 patch, Transdermal, Q24H  pantoprazole, 40 mg, Oral, Q AM  sodium chloride, 10 mL, Intravenous, Q12H      Continuous Infusions:   PRN  Meds:.  acetaminophen    benzonatate    promethazine-codeine    sodium chloride    sodium chloride    sodium chloride    Assessment & Plan   Assessment & Plan     Active Hospital Problems    Diagnosis  POA    **Syncope [R55]  Yes    Transient amnesia [R41.3]  Yes      Resolved Hospital Problems   No resolved problems to display.        Brief Hospital Course to date:  Hussain Feliz is a 49 y.o. male with no significant PMHx presents with episodes of syncope and disorientation in the setting of a viral infection/bronchitis being treated with abx and steroids    Syncope  --seems to be possible vasovagal or laryngeal spasm.  --cardiology recommending 14 day monitor with cardiology follow up  --Neurology will continue EEG--Spell today without any evidence of sz.  --?side effect from steroids  --will have ENT see in AM      Expected Discharge Location and Transportation: home  Expected Discharge   Expected Discharge Date: 11/10/2024; Expected Discharge Time:      VTE Prophylaxis:  Mechanical VTE prophylaxis orders are present.         AM-PAC 6 Clicks Score (PT): 24 (11/09/24 1941)    CODE STATUS:   Code Status and Medical Interventions: CPR (Attempt to Resuscitate); Full Support   Ordered at: 11/08/24 1219     Level Of Support Discussed With:    Patient     Code Status (Patient has no pulse and is not breathing):    CPR (Attempt to Resuscitate)     Medical Interventions (Patient has pulse or is breathing):    Full Support       Maryuri Mccoy MD  11/10/24

## 2024-11-10 NOTE — PLAN OF CARE
Goal Outcome Evaluation: NSR, on RA. Continuous EEG to be continued into tmrw. Wife at bedside. Continue plan of care.

## 2024-11-10 NOTE — PLAN OF CARE
Goal Outcome Evaluation:  Plan of Care Reviewed With: patient        Progress: no change  Outcome Evaluation: Patient rested this shift intermittanly. C/O of being hot. Ice pack given and cool wash cloth. Fan provided. EEG in place. Vitals stable.

## 2024-11-11 PROBLEM — F17.210 CIGARETTE NICOTINE DEPENDENCE WITHOUT COMPLICATION: Status: ACTIVE | Noted: 2024-11-11

## 2024-11-11 PROBLEM — K21.9 GERD (GASTROESOPHAGEAL REFLUX DISEASE): Status: ACTIVE | Noted: 2024-11-11

## 2024-11-11 PROBLEM — J38.3 VOCAL CORD DYSFUNCTION: Status: ACTIVE | Noted: 2024-11-11

## 2024-11-11 PROBLEM — G47.33 OSA (OBSTRUCTIVE SLEEP APNEA): Status: ACTIVE | Noted: 2024-11-11

## 2024-11-11 PROCEDURE — 92524 BEHAVRAL QUALIT ANALYS VOICE: CPT

## 2024-11-11 PROCEDURE — 94761 N-INVAS EAR/PLS OXIMETRY MLT: CPT

## 2024-11-11 PROCEDURE — 94799 UNLISTED PULMONARY SVC/PX: CPT

## 2024-11-11 PROCEDURE — G0378 HOSPITAL OBSERVATION PER HR: HCPCS

## 2024-11-11 PROCEDURE — 95720 EEG PHY/QHP EA INCR W/VEEG: CPT | Performed by: PSYCHIATRY & NEUROLOGY

## 2024-11-11 PROCEDURE — 99232 SBSQ HOSP IP/OBS MODERATE 35: CPT | Performed by: PEDIATRICS

## 2024-11-11 PROCEDURE — 94664 DEMO&/EVAL PT USE INHALER: CPT

## 2024-11-11 PROCEDURE — 99214 OFFICE O/P EST MOD 30 MIN: CPT | Performed by: STUDENT IN AN ORGANIZED HEALTH CARE EDUCATION/TRAINING PROGRAM

## 2024-11-11 PROCEDURE — 92523 SPEECH SOUND LANG COMPREHEN: CPT

## 2024-11-11 PROCEDURE — 99204 OFFICE O/P NEW MOD 45 MIN: CPT | Performed by: INTERNAL MEDICINE

## 2024-11-11 RX ORDER — OXYMETAZOLINE HYDROCHLORIDE 0.05 G/100ML
2 SPRAY NASAL ONCE
Status: DISCONTINUED | OUTPATIENT
Start: 2024-11-11 | End: 2024-11-12 | Stop reason: HOSPADM

## 2024-11-11 RX ADMIN — Medication 10 ML: at 07:46

## 2024-11-11 RX ADMIN — IPRATROPIUM BROMIDE AND ALBUTEROL SULFATE 3 ML: 2.5; .5 SOLUTION RESPIRATORY (INHALATION) at 13:06

## 2024-11-11 RX ADMIN — PHENOL 1 SPRAY: 1.5 LIQUID ORAL at 05:47

## 2024-11-11 RX ADMIN — Medication 10 ML: at 20:30

## 2024-11-11 RX ADMIN — PANTOPRAZOLE SODIUM 40 MG: 40 TABLET, DELAYED RELEASE ORAL at 07:47

## 2024-11-11 RX ADMIN — IPRATROPIUM BROMIDE AND ALBUTEROL SULFATE 3 ML: 2.5; .5 SOLUTION RESPIRATORY (INHALATION) at 16:31

## 2024-11-11 RX ADMIN — PROMETHAZINE HYDROCHLORIDE AND CODEINE PHOSPHATE 5 ML: 6.25; 1 SOLUTION ORAL at 21:45

## 2024-11-11 RX ADMIN — IPRATROPIUM BROMIDE AND ALBUTEROL SULFATE 3 ML: 2.5; .5 SOLUTION RESPIRATORY (INHALATION) at 20:11

## 2024-11-11 NOTE — CONSULTS
ENT CONSULT    Hussain Feliz  [id number]  1975  Date of Consult 11/11/2024       Reason for Consultation:   Chronic cough, laryngospasm, and syncope    History of present illness:    Hussain Feliz is a pleasant 49 year old male being evaluated today for cough and syncopal episodes with concerns of laryngospasm. He developed a URI about 2 weeks ago and was initially placed on a Zpak and Medrol pack. However, he began to develop a cough 10 days ago that has become persistent. The cough is frequent and deep. His wife reports that he has had 12-13 episodes where he coughs so hard and deep, he face turns red to purple, then he is unable to breath and passes out for a few seconds; longest being 45 seconds. He says that when that occurs, he feels like everything in the throat closes off and he can't get air. He reports improvements overall the last 2 days but not resolved. He has been coughing up thick but clear sputum today after doing breathing treatments yesterday. He denies wheezing or chest pain. He does have discomfort in the throat region from coughing so much.     He does have a history GERD and reports developing bronchitis every fall/winter.         Review of Systems  ENT ROS: negative  Pertinent items are noted in HPI  History  Past Medical History:   Diagnosis Date    Heart burn    , History reviewed. No pertinent surgical history.,   Family History   Problem Relation Age of Onset    Hypertension Mother     Obesity Mother     Arthritis Mother    ,   Social History     Tobacco Use    Smoking status: Some Days     Current packs/day: 1.00     Types: Cigarettes    Smokeless tobacco: Never    Tobacco comments:     1 pack per week   Vaping Use    Vaping status: Never Used   Substance Use Topics    Alcohol use: Yes     Comment: maybe one drink week    Drug use: Never   ,   Medications Prior to Admission   Medication Sig Dispense Refill Last Dose/Taking    albuterol sulfate  (90 Base) MCG/ACT inhaler  "Inhale 2 puffs Every 4 (Four) Hours As Needed for Wheezing. 8.5 g 0 2024    [] amoxicillin-clavulanate (AUGMENTIN) 875-125 MG per tablet Take 1 tablet by mouth 2 (Two) Times a Day for 5 days. 10 tablet 0 2024    azithromycin (Zithromax Z-Chalo) 250 MG tablet Take 2 tablets by mouth on day 1, then 1 tablet daily on days 2-5 6 tablet 0     methylPREDNISolone (MEDROL) 4 MG dose pack Take as directed on package instructions. 21 tablet 0     and Allergies:  Patient has no known allergies.    Objective     Vital Signs   /93   Pulse 74   Temp 98.5 °F (36.9 °C) (Oral)   Resp 18   Ht 180.3 cm (71\")   Wt 112 kg (247 lb 1.6 oz)   SpO2 96%   BMI 34.46 kg/m²     Physical Exam:     General Appearance:    Alert, cooperative, in no acute distress   Head:    Normocephalic, without obvious abnormality, atraumatic   Ears:       Throat:   No oral lesions, no thrush, oral mucosa moist; moderate macroglossia; oropharynx clear; nasal septal deviation right    Neck:   No adenopathy, supple, trachea midline, no thyromegaly, no     carotid bruit, no JVD   Lungs:     Clear to auscultation,respirations regular, even and          unlabored    Heart:       Skin:      Neurologic:   Cranial nerves 2 - 12 grossly intact, sensation intact             Results Review:   I reviewed the patient's new clinical results.  Results from last 7 days   Lab Units 24  0505   WBC 10*3/mm3 7.25   HEMOGLOBIN g/dL 16.7   HEMATOCRIT % 47.4   PLATELETS 10*3/mm3 232     Results from last 7 days   Lab Units 24  0505   SODIUM mmol/L 143   POTASSIUM mmol/L 3.8   CHLORIDE mmol/L 110*   CO2 mmol/L 22.0   BUN mg/dL 13   CREATININE mg/dL 0.97   CALCIUM mg/dL 8.3*   BILIRUBIN mg/dL 0.7   ALK PHOS U/L 75   ALT (SGPT) U/L 38   AST (SGOT) U/L 26   GLUCOSE mg/dL 106*       Imaging:  Imaging Results (Last 72 Hours)       Procedure Component Value Units Date/Time    MRI Brain Without Contrast [[id number]] Collected: 24 1538     Updated: " 11/08/24 1554    Narrative:      MRI BRAIN WO CONTRAST    Date of Exam: 11/8/2024 3:13 PM EST    Indication: syncope.     Comparison: CT same day    Technique:  Routine multiplanar/multisequence sequence images of the brain were obtained without contrast administration.      Findings:  Diffusion-weighted imaging demonstrates no acute restriction abnormality.    Midline structures of the brain are grossly unremarkable in appearance. Pituitary and sella structures and craniocervical junction are grossly unremarkable in their appearance. The ventricles, cisterns and sulci appear within normal limits. No acute   intracranial hemorrhage or mass effect is noted. Gray and white matter signal characteristics appear within normal limits. Major intracranial flow voids appear grossly patent. Please see CT angiogram same day for further evaluation. Mild mucosal   thickening of the paranasal sinuses noted. Globes and orbits appear grossly unremarkable in their appearance. Mastoid air cells appear to be grossly clear. Cerebral pontine angle structures and inner ear structures appear grossly unremarkable in   appearance.      Impression:      Impression:  No acute intracranial abnormality        Electronically Signed: Cristian Richards MD    11/8/2024 3:51 PM EST    Workstation ID: [id number]    CT Angiogram Neck [[id number]] Collected: 11/08/24 1319     Updated: 11/08/24 1332    Narrative:      CT ANGIOGRAM NECK, CT ANGIOGRAM HEAD    Date of Exam: 11/8/2024 12:54 PM EST    Indication: syncope.    Comparison: CT head from earlier today    Technique: CTA of the head and neck was performed before and after the uneventful intravenous administration of 75 mL Isovue-370. Reconstructed coronal and sagittal images were also obtained. In addition, a 3-D volume rendered image was created for   interpretation. Automated exposure control and iterative reconstruction methods were used.    Findings:  There is a three-vessel aortic arch. The right  common carotid artery is widely patent with mild plaque distally. The right carotid bifurcation is widely patent. The right internal carotid artery is widely patent. The left common carotid artery is widely   patent. The left carotid bifurcation is widely patent. The left internal carotid artery is widely patent. The right vertebral artery is widely patent. The left vertebral artery is widely patent. The left vertebral artery is dominant.    The bilateral middle cerebral, bilateral anterior cerebral, and anterior communicating arteries are widely patent. The basilar and bilateral posterior cerebral arteries are widely patent. There are patent bilateral posterior communicating arteries. No   intracranial aneurysm is identified. The visualized portions of the bilateral superior cerebellar, anteroinferior cerebellar, and posterior inferior cerebellar arteries are unremarkable.      Impression:      Impression:  Major arterial vasculature within head and neck appears widely patent, with no hemodynamically significant stenosis, dissection, thrombus, or aneurysm.      Electronically Signed: Luis Dennis MD    11/8/2024 1:29 PM EST    Workstation ID: [id number]    CT Angiogram Head [[id number]] Collected: 11/08/24 1319     Updated: 11/08/24 1332    Narrative:      CT ANGIOGRAM NECK, CT ANGIOGRAM HEAD    Date of Exam: 11/8/2024 12:54 PM EST    Indication: syncope.    Comparison: CT head from earlier today    Technique: CTA of the head and neck was performed before and after the uneventful intravenous administration of 75 mL Isovue-370. Reconstructed coronal and sagittal images were also obtained. In addition, a 3-D volume rendered image was created for   interpretation. Automated exposure control and iterative reconstruction methods were used.    Findings:  There is a three-vessel aortic arch. The right common carotid artery is widely patent with mild plaque distally. The right carotid bifurcation is widely patent. The  right internal carotid artery is widely patent. The left common carotid artery is widely   patent. The left carotid bifurcation is widely patent. The left internal carotid artery is widely patent. The right vertebral artery is widely patent. The left vertebral artery is widely patent. The left vertebral artery is dominant.    The bilateral middle cerebral, bilateral anterior cerebral, and anterior communicating arteries are widely patent. The basilar and bilateral posterior cerebral arteries are widely patent. There are patent bilateral posterior communicating arteries. No   intracranial aneurysm is identified. The visualized portions of the bilateral superior cerebellar, anteroinferior cerebellar, and posterior inferior cerebellar arteries are unremarkable.      Impression:      Impression:  Major arterial vasculature within head and neck appears widely patent, with no hemodynamically significant stenosis, dissection, thrombus, or aneurysm.      Electronically Signed: Luis Dennis MD    11/8/2024 1:29 PM EST    Workstation ID: [id number]    CT Head Without Contrast [[id number]] Collected: 11/08/24 1316     Updated: 11/08/24 1321    Narrative:      CT HEAD WO CONTRAST    Date of Exam: 11/8/2024 12:54 PM EST    Indication: syncope.    Comparison: None available.    Technique: Axial CT images were obtained of the head without contrast administration.  Automated exposure control and iterative construction methods were used.      Findings:  No acute intracranial hemorrhage or extra-axial collection is identified. The ventricles appear normal in caliber, with no evidence of mass effect or midline shift. The basal cisterns appear patent. The gray-white differentiation appears preserved.    The calvarium appear intact. The paranasal sinuses are clear. The mastoid air cells are well-aerated.      Impression:      Impression:  No acute intracranial process identified.        Electronically Signed: Luis Dennis MD     11/8/2024 1:18 PM EST    Workstation ID: [id number]                  Assessment:     Chronic cough  Layrngospasm  Laryngoesophageal Reflux  Vasovagal syncope    Plan:    - Flexible laryngoscopy performed today: no evidence of laryngeal lesion or mass present; no acute obstruction; evidence of mild erythema and edema of the arytenoids and base of epiglottis; no lesions or nodules of the vocal cords. There was mild-moderate collapse/spasm of laryngeal walls with deep coughing but no complete obstruction or closure of tracheal inlet.   - Etiology of cough is not completely identifiable at this time but there is evidence of mild reflux in general and semi-collapse of larynx with deep coughing. Suspect reversible vasovagal syncope from laryngospasm's.   - Continue with Duoneb treatments 4 x daily and Protonix 40mg QD until discharge  - Continue with speech pathology evaluation  - Pulmonary consultation ordered   - History and examination reviewed with my attending, Dr. Roberto Mehta, who is in agreement with the above POC. He will follow up with the patient in the morning and repeat laryngoscopy.       I discussed the patients findings and my recommendations with patient and family    Viktor Capellan, APRN  11/11/24  12:55 EST    Time: More than 50% of time spent in counseling and coordination of care:  Total face-to-face/floor time 30 min.  Time spent in counseling 15 min. Counseling included the following topics: purpose of laryngoscopy, finding of laryngoscopy, and further plan of care      I examined the patient as well and had a long discussion with his family.  He has had a very thorough workup to rule out atypical pneumonia, bronchitis, cardiac or vascular abnormalities.  It appears that he has multifactorial laryngospasm brought on by irritation from reflux, recent infection, and tobacco use.  Is causing a vocal cord dysfunction along with the laryngospasm that is contributing to the syncopal episodes.  I  appreciate speech pathologist working with him and seeing him as an outpatient in order to assist in managing this.  Also stressed the importance of discontinuing the smoking habit and all other factors that can contribute to his symptoms.    Roberto Mehta MD

## 2024-11-11 NOTE — NURSING NOTE
Yesika EEG tech, at bedside to check integrity of recording and electrodes.  The wrap was completely off the patient's head and the EEG was unreadable.  All the electrodes and wrap was reapplied and the recording was of good quality.  When the wrap was off, there was no skin issues noted.  Will recheck and/or await word for d/c.

## 2024-11-11 NOTE — PLAN OF CARE
"Goal Outcome Evaluation:  Plan of Care Reviewed With: patient        Progress: no change  Outcome Evaluation: Patient rested this shift. EEG in place. Vitals stable. Patient given prn cough medicine once this shift. Patient c/o \"tickle in back of throat\". Provider notified and prn chloraseptic spray ordered. Continues to have frequent non productive iincessant cough.                             "

## 2024-11-11 NOTE — PLAN OF CARE
"Goal Outcome Evaluation:Pt was able to shower this am without incident.   VSS on RA.  NSR on tele w no ectopy.  He did have one minor episode where he \"passed out\"  while in bed per his wife.     He did not fall and this seems to happen when he has severe coughing spell.     Consults today from SLP, Pulm,  and EENT appreciated.                                             "

## 2024-11-11 NOTE — PLAN OF CARE
Goal Outcome Evaluation:  Plan of Care Reviewed With: patient, spouse        Progress: improving          Symptoms consistent with diagnosis of PVFM

## 2024-11-11 NOTE — CASE MANAGEMENT/SOCIAL WORK
Discharge Planning Assessment  [facility]     Patient Name: Hussain Feliz  MRN: [id number]  Today's Date: 11/11/2024    Admit Date: 11/8/2024    Plan: Home   Discharge Needs Assessment       Row Name 11/11/24 1530       Living Environment    People in Home spouse    Current Living Arrangements home    Potentially Unsafe Housing Conditions unable to assess    In the past 12 months has the electric, gas, oil, or water company threatened to shut off services in your home? No    Primary Care Provided by self    Provides Primary Care For no one    Family Caregiver if Needed spouse    Family Caregiver Names Kamila Feliz, spouse [phone]    Quality of Family Relationships supportive;involved;helpful    Able to Return to Prior Arrangements yes       Resource/Environmental Concerns    Resource/Environmental Concerns none    Transportation Concerns none       Transportation Needs    In the past 12 months, has lack of transportation kept you from medical appointments or from getting medications? no    In the past 12 months, has lack of transportation kept you from meetings, work, or from getting things needed for daily living? No       Food Insecurity    Within the past 12 months, you worried that your food would run out before you got the money to buy more. Never true    Within the past 12 months, the food you bought just didn't last and you didn't have money to get more. Never true       Transition Planning    Patient/Family Anticipates Transition to home with family    Patient/Family Anticipated Services at Transition none    Transportation Anticipated family or friend will provide       Discharge Needs Assessment    Readmission Within the Last 30 Days no previous admission in last 30 days    Equipment Currently Used at Home none    Concerns to be Addressed no discharge needs identified    Do you want help finding or keeping work or a job? I do not need or want help    Do you want help with school or training?  For example, starting or completing job training or getting a high school diploma, GED or equivalent No    Anticipated Changes Related to Illness none    Equipment Needed After Discharge none    Discharge Coordination/Progress With verbal consent, I spoke with Pt and spouse, in room, to initiate discharge plan. Pt is admitted with syncope. He lives with spouse in [facility]. There is one flight of stairs in the home. He ambulates independently and is independent with ADLs, at baseline. His PCP is JEANNINE Marie. He has [employer] insurance which has Rx coverage. He uses [facility], [place]. He states prescriptions are affordable. He denies DME/HH/O2. His plan is to return home at discharge. Family will provide transportation. Pt requested CM change pharmacy in [facility] to meds-to-bed. CM will continue to follow hospital course.                   Discharge Plan       Row Name 11/11/24 1539       Plan    Plan Home    Final Discharge Disposition Code 01 - home or self-care                  Continued Care and Services - Admitted Since 11/8/2024    No active coordination exists for this encounter.       Selected Continued Care - Episodes Includes continued care and service providers with selected services from the active episodes listed below      Rising Risk Care Management Episode start date: 11/5/2024   There are no active outsourced providers for this episode.                 Expected Discharge Date and Time       Expected Discharge Date Expected Discharge Time    Nov 12, 2024            Demographic Summary       Row Name 11/11/24 1529       General Information    Arrived From home    Reason for Consult discharge planning    Preferred Language English    General Information Comments PCP JEANNINE Marie       Contact Information    Permission Granted to Share Info With     Contact Information Obtained for     Contact Information Comments Kamila Feliz, spouse [phone]                    Functional Status       Row Name 11/11/24 1530       Functional Status    Usual Activity Tolerance good    Current Activity Tolerance good       Functional Status, IADL    Medications independent    Meal Preparation independent    Housekeeping independent    Laundry independent    Shopping independent                   Psychosocial    No documentation.                  Abuse/Neglect    No documentation.                  Legal    No documentation.                  Substance Abuse    No documentation.                  Patient Forms    No documentation.                     Jo Castillo RN

## 2024-11-11 NOTE — THERAPY EVALUATION
Acute Care - Speech Language Pathology Initial Evaluation  [facility]  Paradoxical Vocal Fold Movement (PVFM) Evaluation       Patient Name: Hussain Feliz  : 1975  MRN: [id number]  Today's Date: 2024               Admit Date: 2024     Visit Dx:    ICD-10-CM ICD-9-CM   1. Syncope and collapse  R55 780.2   2. Vocal cord dysfunction  J38.3 478.5     Patient Active Problem List   Diagnosis    Abnormal blood level of iron    Syncope    Transient amnesia     Past Medical History:   Diagnosis Date    Heart burn      History reviewed. No pertinent surgical history.    SLP Recommendation and Plan        SLP Voice Recommended and Strategies: Vocal hygiene techniques, Reflux precautions, Other (see comments) (chronic cough precautions) (24 1400)  SLP Voice Therapy Frequency: 5 days per week (24)  Anticipated Discharge Disposition: home w/ OP services (24 1400)             Progress: improving (24 1509)    VOICE ASSESSMENT (Last 72 Hours)       Voice Assessment       Row Name 24                   Voice Assessment/Intervention    Document Type evaluation  -RS        Subjective Information no complaints  -RS        Patient Observations alert;cooperative;agree to therapy  -RS        Patient/Family/Caregiver Comments/Observations wife present  -RS        Care Plan Review, Other Participant(s) spouse  -RS        Patient Effort good  -RS        Symptoms Noted During/After Treatment none  -RS           General Information    Patient Profile Reviewed yes  -RS        Pertinent History Of Current Problem Pt is a 49 yoM w PMHx significant for GERD, childhood asthma, and reports developing bronchitis every fall/winter. He  -RS        Current Method of Nutrition regular textures;thin liquids  -RS        Precautions/Limitations, Vision WFL;for purposes of eval  -RS        Precautions/Limitations, Hearing WFL;for purposes of eval  -RS        Prior Level of Function-Communication  WFL  -RS        Prior Level of Function-Swallowing esophageal concerns  -RS        Plans/Goals Discussed with patient;spouse/S.O.;agreed upon  -RS        Barriers to Rehab none identified  -RS        Patient's Goals for Discharge return to all previous roles/activities  -RS        Family Goals for Discharge other (see comments)  reduce frequency of and stop syncopal episodes  -RS           Pain    Pretreatment Pain Rating 0/10 - no pain  -RS        Posttreatment Pain Rating 0/10 - no pain  -RS           Paradoxical Vocal Fold Movement History    Relevant Medical History Patient has reflux;Patient is diagnosed with asthma;History of ER Visits and/or Hospitalizations (see comments);Other (see comments)  childhood asthma  -RS        Reported Symptoms Characteristic of Paradoxical Vocal Fold Movement Cannot get enough air;Breathing problems during the day;Breathing problems at night;Chest tightness;Throat tightness;Feels like going to suffocate;Nothing that has been tried has helped breathing  -RS        Triggers Stress;Eating/Drinking  -RS        Symptoms Sudden onset of attacks;Chronic coughing;Hoarseness;Shortness of breath;Wheezing  -RS           Vocal Hygiene    Daily Caffeine Intake Other (see comments)  Redbull and energy drinks  -RS        Smoking History Current Smoker  -RS        Smoking Behaviors Began Smoking;Cigarettes  -RS        Cigarettes Per Day 3  -RS        Vocal Abuses Excessive talking;Talking over backgrouond noise;Yelling/Screaming;Throat clearing;Coughing;Smoking;Unmanaged reflux  -RS        Environmental Issues Exposure to smoke  -RS        Pulmonary Status Asthma;Other (see comments)  childhood asthma, currently asymptomatic and has not followed w pulmonologist in decades  -RS        Reflux History Patient reports significant reflux;Other (see comments)  wife has encouraged use of PPI but pt has declined  -RS        Description of Reflux Heartburn/Indigestion;Wake up at night with trouble  breathing/coughing/choking  -RS        Typical Voice Usage/Activities Social voice use (see comments);Other (see comments)  sports lana  -RS           Voice Assessment/Intervention    Quality and Resonance (Voice) Hoarse;Rough  -RS           SLP Clinical Impression    Paradoxical Vocal Fold Movement Impression Symptoms consistent with diagnosis of PVFM  -RS        Rehab Potential/Prognosis good  -RS        SLP Criteria for Skilled Therapy Intervention yes  -RS           SLP Voice Recommendations    SLP Voice Therapy Frequency 5 days per week  -RS        Predicted Duration Therapy Intervention (Days) other (see comments)  while inpatient and then continued as OP  -RS        SLP Voice Recommended and Strategies Vocal hygiene techniques;Reflux precautions;Other (see comments)  chronic cough precautions  -RS        Anticipated Discharge Disposition home w/ OP services  -RS                  User Key  (r) = Recorded By, (t) = Taken By, (c) = Cosigned By      Initials Name Effective Dates    RS Valente Echevarria MS CCC-SLP 09/14/23 -                        EDUCATION  The patient has been educated in the following areas:     PVFM .         SLP GOALS       Row Name 11/11/24 1400             SLP VOICE GOALS    SLP PVFM SHORT TERM GOAL Pt will demonstrate improved breathing pattern in order to reduce tension in the laryngeal area by using diaphragmatic breathing at rest/during phonation;Pt will demonstrate improved breathing pattern to reduce tension in laryngeal area by using slow exhalation;Pt will demonstrate improved breathing pattern to reduce tension in laryngeal area by using sniff inhalation;Pt will demonstrate improved breathing pattern to reduce tension in laryngeal area by using open throat inhalation;Pt will perform laryngeal and cervical relaxation exercises to improve muscle awareness and reduce cervical and laryngeal muscle tension;Pt will utilize techniques to improve airflow during breathing and speech  tasks;Pt will perform relaxed throat breathing exercises and/or facilitative techniques to inhibit laryngeal spasm and maintain an open airway  -RS      SLP PVFM LONG TERM GOAL Pt will be able to participate in activities of daily living/physical activity without experiencing restricted breathing due to PVFM  -RS         PVFM Long Term Goals Participate without experiencing restricted breathing    Time Frame (PVFM Participate in activities of Daily Living) 1 week  -RS      Progress/Outcomes (PVFM ability to participate in activities of daily living/physical activity) new goal  -RS         PVFM Short Term Goal Improve breathing pattern    Time Frame (PVFM I using diaphragmatic breathing at rest/during phonation) 1 week  -RS      Progress/Outcomes (PVFM toward using diaphragmatic breathing at rest/during phonation) new goal  -RS         PVFM Short Term Goal Pt to improve breathing by using slow exhalation    Time Frame (PVFM I using slow exhalation) 1 week  -RS      Progress/Outcomes (PVFM toward using slow exhalation) new goal  -RS         PVFM Short Term Goal Pt to improve breathing by using sniff inhalation    Time Frame (PVFM I using sniff inhalation) 1 week  -RS      Progress/Outcomes (PVFM toward using sniff inhalation) new goal  -RS         PVFM Short Term Goal Pt to improve breathing by using open throat inhalation    Time Frame (PVFM I using open throat inhalation) 1 week  -RS      Progress/Outcomes (PVFM toward using open throat inhalation) new goal  -RS         PVFM Short Term Goal Pt to perform laryngeal and cervical relaxation exercises    Time Frame (PVFM using laryngeal and cervical relaxation exercises) 1 week  -RS      Progress/Outcomes (PVFM toward using laryngeal and cervical relaxation exercises) new goal  -RS         PVFM Short Term Goal Pt to improve breathing by using techniques to improve airflow during breathing and speech tasks    Time Frame (PVFM I using techniques to improve airflow) 1 week   -RS      Progress/Outcomes (PVFM toward using techniques to improve airflow during breathing and speech tasks) new goal  -RS         PVFM Short Term Goal Pt to inhibit laryngeal spasm and maintain an open airway    Time Frame (PVFM using techniques using relaxed throat breathing exercises) 1 week  -RS      Progress/Outcomes (PVFM toward performing relaxed throat breathing exercises) new goal  -RS                User Key  (r) = Recorded By, (t) = Taken By, (c) = Cosigned By      Initials Name Provider Type    Valente Stafford MS CCC-SLP Speech and Language Pathologist                              Time Calculation:      Time Calculation- SLP       Row Name 11/11/24 1506             Time Calculation- SLP    SLP Start Time 1340  -RS      SLP Received On 11/11/24  -RS         Untimed Charges    54181-OE Eval Speech and Production w/ Language Minutes 87  -RS         Total Minutes    Untimed Charges Total Minutes 87  -RS       Total Minutes 87  -RS                User Key  (r) = Recorded By, (t) = Taken By, (c) = Cosigned By      Initials Name Provider Type    Valente Stafford MS CCC-SLP Speech and Language Pathologist                    Therapy Charges for Today       Code Description Service Date Service Provider Modifiers Qty    [id number]  [facility] 6 11/11/2024 Valente Echevarria MS CCC-SLP GN 1                       Valente Echevarria MS CCC-CHERYL  11/11/2024

## 2024-11-11 NOTE — CONSULTS
Pulmonary Consult     LOS: 0 days   Patient Care Team:  Hilda Beckett PA as PCP - General (Physician Assistant)  Kareen Singh RN as Ambulatory  ([facility])    Chief Complaint: syncope and dyspnea    Subjective     49 y.o. active smoker with history of GERD admitted to the hospital for intermittent episodes of difficulty breathing associated with some apparent syncopal events.  Part of history is relayed by the patient's wife who works in the emergency department at [employer].  Patient apparently has had URI symptoms for about 2 weeks and has had several episodes of inability to move any air at all associated with brief syncopal events.  Some of these events have been witnessed by the patient's wife.  She reports his face turns red and he is unable to move any air in or out.  Veins become distended and he eventually either passes out or starts breathing again.  In one instance, the patient apparently became cyanotic briefly.  He has had significant cough and symptoms are precipitated by cough.  Patient was prescribed a Z-Chalo and a Medrol Dosepak without significant improvement.    Patient was seen by ENT today who felt this patient had some evidence of reflux and was concerned about episodes of laryngospasm.    Patient had a CT angiogram of the chest on admission which I reviewed and shows evidence of some prior granulomatous disease but no acute abnormality.  It does appear that he has a small hiatal hernia.    Patient does snore and has frequent napping/daytime somnolence.    History taken from: Patient and spouse, chart    PMH/FH/Social History were reviewed and updated appropriately in the electronic medical record.     Past Medical History:   Diagnosis Date    Heart burn      History reviewed. No pertinent surgical history.    Family History   Problem Relation Age of Onset    Hypertension Mother     Obesity Mother     Arthritis Mother      Social History     Socioeconomic History    Marital  status:    Tobacco Use    Smoking status: Some Days     Current packs/day: 1.00     Types: Cigarettes    Smokeless tobacco: Never    Tobacco comments:     1 pack per week   Vaping Use    Vaping status: Never Used   Substance and Sexual Activity    Alcohol use: Yes     Comment: maybe one drink week    Drug use: Never    Sexual activity: Defer         Review of Systems:    Review of 14 systems was completed with positives and pertinent negatives noted in the subjective section.  All other systems reviewed and are negative.       Objective     Vital Signs  Temp:  [97.8 °F (36.6 °C)-98.7 °F (37.1 °C)] 98.7 °F (37.1 °C)  Heart Rate:  [54-79] 65  Resp:  [16-18] 18  BP: (110-146)/(64-93) 120/80  11/10 0701 - 11/11 0700  In: -   Out: 500 [Urine:500]  Body mass index is 34.46 kg/m².     IV drips:      Physical Exam:     Constitutional:   Alert, in no acute distress   Head:   Normocephalic, atraumatic   Eyes:           Lids and lashes normal, conjunctivae and sclerae normal.  PER   ENMT:  Ears appear intact with no abnormalities noted     Lips normal.  Mallampati 4.   Neck:  Trachea midline, no JVD   Lungs/Resp:    Normal effort, symmetric chest rise, no crepitus, clear to      auscultation bilaterally.               Heart/CV:   Regular rhythm and normal rate, no murmur   Abdomen/GI:    Soft, nontender, nondistended   :    Deferred   Extremities/MSK:  No clubbing or cyanosis.  No edema.     Pulses:  Pulses palpable and equal bilaterally   Skin:  No bleeding, bruising or rash   Heme/Lymph:  No cervical or supraclavicular adenopathy.   Neurologic:    Psychiatric:    Moves all extremities with no obvious focal motor deficit.  Cranial nerves 2 - 12 grossly intact  Non-agitated, normal affect.    The above physical exam findings were reviewed and reflect my exam findings as of today's exam.   Electronically signed by:  Alexander Portillo MD  11/11/24  16:22 EST      Results Review:     I reviewed the patient's new clinical  results.   Results from last 7 days   Lab Units 11/09/24  0505 11/08/24  1034   SODIUM mmol/L 143 143   POTASSIUM mmol/L 3.8 4.2   CHLORIDE mmol/L 110* 106   CO2 mmol/L 22.0 30.3*   BUN mg/dL 13 13   CREATININE mg/dL 0.97 1.12   CALCIUM mg/dL 8.3* 8.9   BILIRUBIN mg/dL 0.7 0.8   ALK PHOS U/L 75 79   ALT (SGPT) U/L 38 41   AST (SGOT) U/L 26 32   GLUCOSE mg/dL 106* 103*     Results from last 7 days   Lab Units 11/09/24  0505 11/08/24  1034   WBC 10*3/mm3 7.25 7.04   HEMOGLOBIN g/dL 16.7 18.0*   HEMATOCRIT % 47.4 51.5*   PLATELETS 10*3/mm3 232 253     Results from last 7 days   Lab Units 11/08/24  1519   PH, ARTERIAL pH units 7.408   PO2 ART mm Hg 62.9*   PCO2, ARTERIAL mm Hg 40.1   HCO3 ART mmol/L 25.3     Results from last 7 days   Lab Units 11/08/24  1034   MAGNESIUM mg/dL 2.2       I reviewed the patient's new imaging including images and reports.    CTA of the chest reviewed with findings as noted in HPI.    Medication Review:   ipratropium-albuterol, 3 mL, Nebulization, 4x Daily - RT  nicotine, 1 patch, Transdermal, Q24H  oxymetazoline, 2 spray, Each Nare, Once  pantoprazole, 40 mg, Oral, Q AM  sodium chloride, 10 mL, Intravenous, Q12H           Assessment & Plan         Syncope    Transient amnesia    GERD (gastroesophageal reflux disease)    Vocal cord dysfunction    GREGOR (obstructive sleep apnea)    Cigarette nicotine dependence without complication    49 y.o. active smoker with history of GERD admitted to the hospital for intermittent episodes of difficulty breathing associated with some apparent syncopal events.      Patient was seen by ENT today who felt this patient had some evidence of reflux and was concerned about episodes of laryngospasm.    Patient had a CT angiogram of the chest on admission which I reviewed and shows evidence of some prior granulomatous disease but no acute abnormality.  It does appear that he has a small hiatal hernia.    Patient does snore and has frequent napping/daytime  somnolence.    Patient's history is highly suggestive of vocal cord dysfunction with resultant vasovagal syncope.  I agree with speech therapy for vocal cord dysfunction.    Counseled patient on smoking cessation.    Recommended outpatient sleep study.  Counseled against driving while drowsy or symptomatic from his cough.    Recommended PPI.  I also discussed nonpharmacologic management strategies for gastroesophageal reflux disease. These include avoiding spicy foods, fatty foods, alcohol, and other foods that cause reflux symptoms. Avoiding eating or drinking within 4 hours of going to bed, and raising the head of the bed 6-8 inches on blocks.    Discussed with primary attending.    Call with any questions.    Electronically signed by:    Alexander Portillo MD  11/11/24  16:22 EST      *. Please note that portions of this note were completed with US[employer] - a voice recognition program.

## 2024-11-11 NOTE — PROGRESS NOTES
[facility] Neurology    Progress Note    Patient Name: Hussain Feliz  : 1975  MRN: [id number]  Primary Care Physician:  Hilda Beckett PA  Date of admission: 2024    Subjective     Reason for consult: Syncope     History of Present Illness   Patient had another episode this morning.  He is to be evaluated by ENT    Review of Systems   General: Negative for fever, nausea, or vomiting.   Neurological: Negative for headache, pain, or weakness.     Objective     Vitals:   Temp:  [97.8 °F (36.6 °C)-98.6 °F (37 °C)] 98.5 °F (36.9 °C)  Heart Rate:  [54-80] 76  Resp:  [16-18] 18  BP: (110-146)/(64-93) 146/93    Neurologic Exam   Mental status/Cognition: Awake and alert  Speech/language: fluent; follows commands    Cranial nerves: Tracks examiner.  Face appears symmetric. No dysarthria.      Motor: No drift in any extremities, able to raise all to antigravity.      Current Medications    Current Facility-Administered Medications:     acetaminophen (TYLENOL) tablet 650 mg, 650 mg, Oral, Q6H PRN, Zack Cornelius DO, 650 mg at 24 2337    benzonatate (TESSALON) capsule 100 mg, 100 mg, Oral, TID PRN, Maryuri Mccoy MD    ipratropium-albuterol (DUO-NEB) nebulizer solution 3 mL, 3 mL, Nebulization, 4x Daily - RT, Maryuri Mccoy MD, 3 mL at 24 1306    nicotine (NICODERM CQ) 14 MG/24HR patch 1 patch, 1 patch, Transdermal, Q24H, Nat Singletary PA-C    oxymetazoline (AFRIN) nasal spray 2 spray, 2 spray, Each Nare, Once, Roberto Mehta MD    pantoprazole (PROTONIX) EC tablet 40 mg, 40 mg, Oral, Q AM, Nat Singletary PA-C, 40 mg at 24 0747    phenol (CHLORASEPTIC) 1.4 % liquid 1 spray, 1 spray, Mouth/Throat, Q2H PRN, Acacia Cisneros APRN, 1 spray at 24 0547    promethazine-codeine (PHENERGAN with CODEINE) 6.25-10 MG/5ML syrup 5 mL, 5 mL, Oral, Q6H PRN, Maryuri Mccoy MD, 5 mL at 11/10/24 2323    sodium chloride 0.9 % flush 10 mL, 10 mL, Intravenous, PRN,  "Nat Singletary PA-C    sodium chloride 0.9 % flush 10 mL, 10 mL, Intravenous, Q12H, Nat Singletary PA-C, 10 mL at 11/10/24 0815    sodium chloride 0.9 % flush 10 mL, 10 mL, Intravenous, PRN, Nat Singletary PA-MARGARET, 10 mL at 11/11/24 0746    sodium chloride 0.9 % infusion 40 mL, 40 mL, Intravenous, PRN, Nat Singletary PA-C    Laboratory Results:   Lab Results   Component Value Date    GLUCOSE 106 (H) 11/09/2024    CALCIUM 8.3 (L) 11/09/2024     11/09/2024    K 3.8 11/09/2024    CO2 22.0 11/09/2024     (H) 11/09/2024    BUN 13 11/09/2024    CREATININE 0.97 11/09/2024    BCR 13.4 11/09/2024    ANIONGAP 11.0 11/09/2024     Lab Results   Component Value Date    WBC 7.25 11/09/2024    HGB 16.7 11/09/2024    HCT 47.4 11/09/2024    MCV 92.0 11/09/2024     11/09/2024     No results found for: \"CHOL\"  Lab Results   Component Value Date    HDL 28 (L) 08/29/2023     Lab Results   Component Value Date     (H) 08/29/2023     Lab Results   Component Value Date    TRIG 100 08/29/2023     Lab Results   Component Value Date    HGBA1C 4.9 08/29/2023     Lab Results   Component Value Date    FOLATE 3.9 08/29/2023     Lab Results   Component Value Date    QCFSFNHB75 582 11/09/2024     Ammonia 49  CRP <0.30  Lactate 1.0  UA unremarkable  RVP negative    Images/Procedures   CT head 11/8/2024  No acute findings     CTA brain and neck 11/8/2024  No LVO or significant stenosis     MRI brain without contrast 11/8/2024  No acute findings    Continuous EEG 11/9 through 11/10/2024  Normal EEG in the awake and asleep states.  No focal features or epileptiform activity.  There was 1 event captured at 11:55 that had no EEG correlate for slowing.  Assessment / Plan   Active Hospital Problems:  Episodes of loss of consciousness  GERD    Brief Patient Summary:  Hussain Feliz is a 49 y.o. male with history of no significant medical history who is presenting with episodes of altered consciousness.    Wife " describes multiple episodes of loss of consciousness, typically associated with coughing.  However he had 2 episodes that were unwitnessed and he does not remember coughing preceding the event.      His exam is intact.  One episode was captured on EEG that had no EEG correlate.  Episodes likely not epileptic in nature.  He is to be evaluated by ENT later today.    Further neurologic workup.  Patient is okay to discharge when medically ready    Plan:   -Discontinue EEG  -No need for antiseizure medications  -No need for neurologic follow-up  -He is okay to discharge when medically ready from a neurologic perspective    General Neurology will see upon request.  Please reach out with any questions      I have discussed the above with the patient, family, bedside RN, hospitalist  Time spent with patient: 35 minutes in face-to-face evaluation and management of the patient.      Aidan Gallardo DO, MS

## 2024-11-11 NOTE — PROGRESS NOTES
[facility] Medicine Services  PROGRESS NOTE    Patient Name: Hussain Feliz  : 1975  MRN: [id number]    Date of Admission: 2024  Primary Care Physician: Hilda Beckett PA    Subjective   Subjective     CC:  syncope    HPI:  Patient still with coughing episodes.  Otherwise has remained stable.  Diet is going well.  No other pain or shortness of breath.      Objective   Objective     Vital Signs:   Temp:  [97.8 °F (36.6 °C)-98.7 °F (37.1 °C)] 98.7 °F (37.1 °C)  Heart Rate:  [54-79] 65  Resp:  [16-18] 18  BP: (110-146)/(64-93) 120/80     Physical Exam:  Constitutional: No acute distress, awake, alert  HENT: NCAT, mucous membranes moist  Respiratory: Clear to auscultation bilaterally, respiratory effort normal   Cardiovascular: bradycardic, no murmurs, rubs, or gallops  Gastrointestinal: Positive bowel sounds, soft, nontender, nondistended  Musculoskeletal: No bilateral ankle edema  Psychiatric: Appropriate affect, cooperative  Neurologic: Oriented x 3, strength symmetric in all extremities, Cranial Nerves grossly intact to confrontation, speech clear  Skin: No rashes    Bradycardic, sinus on tele      Results Reviewed:  LAB RESULTS:      Lab 24  0505 24  1803 24  1436 24  1034   WBC 7.25  --   --  7.04   HEMOGLOBIN 16.7  --   --  18.0*   HEMATOCRIT 47.4  --   --  51.5*   PLATELETS 232  --   --  253   NEUTROS ABS  --   --   --  3.54   IMMATURE GRANS (ABS)  --   --   --  0.04   LYMPHS ABS  --   --   --  2.54   MONOS ABS  --   --   --  0.53   EOS ABS  --   --   --  0.35   MCV 92.0  --   --  91.5   CRP  --   --   --  <0.30   LACTATE  --   --   --  1.0   HSTROP T 10 9 8 10         Lab 24  0505 24  1034   SODIUM 143 143   POTASSIUM 3.8 4.2   CHLORIDE 110* 106   CO2 22.0 30.3*   ANION GAP 11.0 6.7   BUN 13 13   CREATININE 0.97 1.12   EGFR 96.3 81.0   GLUCOSE 106* 103*   CALCIUM 8.3* 8.9   MAGNESIUM  --  2.2         Lab 24  6399  11/08/24  1034   TOTAL PROTEIN 6.2 6.8   ALBUMIN 3.8 4.3   GLOBULIN 2.4 2.5   ALT (SGPT) 38 41   AST (SGOT) 26 32   BILIRUBIN 0.7 0.8   ALK PHOS 75 79         Lab 11/09/24  0505 11/08/24  1803 11/08/24  1436 11/08/24  1034   PROBNP  --   --   --  <36.0   HSTROP T 10 9 8 10             Lab 11/09/24  0505   VITAMIN B 12 582         Lab 11/08/24  1519   PH, ARTERIAL 7.408   PCO2, ARTERIAL 40.1   PO2 ART 62.9*   FIO2 21   HCO3 ART 25.3   BASE EXCESS ART 0.6   CARBOXYHEMOGLOBIN 0.9     Brief Urine Lab Results  (Last result in the past 365 days)        Color   Clarity   Blood   Leuk Est   Nitrite   Protein   CREAT   Urine HCG        11/08/24 1555 Yellow   Clear   Negative   Negative   Negative   Negative                   Microbiology Results Abnormal       None            EEG Continuous Monitoring With Video    Result Date: 11/11/2024  Date of Procedure: 11-10 at 11 AM through 11-11 at 7:45 AM Reason for referral: 49 y.o.male with recurrent episodes of loss of consciousness, consideration of seizures, seizure classification Technical summary: A 19 channel digital EEG is performed using the international 10-20 placement system, including eye leads and EKG leads.  Video is available, and split screen technology with video/EEG correlation is used as needed.   A patient event button is offered. Review of the relevant video-EEG data was performed throughout the day.  Detailed review of the EEG and relevant video was performed using multiple montages, including a variety of referential and bipolar montages.  Results of the monitoring related to the treatment team frequently during the study, at least once a day.  Where available, seizure detection software was used for the detection of ictal and interictal discharges. Findings: At the beginning of the study, the patient is awake and resting comfortably in bed.  Diffuse low amplitude alpha activity is present symmetrically over both hemispheres.  EMG and eye blink artifact are  noted anteriorly.  At times a well-regulated 9 Hz posterior rhythm is evident symmetrically over the occipital leads.  Shortly after study onset, around 11:30 in the morning, the patient has an episode of coughing.  He is observed to be sitting up in bed and there is movement artifact on the EEG.  He then by report has a brief episode of loss of consciousness or altered mental status.  During this time, no changes in the EEG are seen.  No rhythmic or periodic discharges are noted.  No focal slowing is appreciated and no epileptiform activity is seen.  Over the course of the day, although the patient has additional coughing spells, there are no further associated episodes of altered mental status.  Overnight, sleep is seen with slowing of the background and vertex waves.  Around 3 AM, significant electrode artifact becomes apparent and the study becomes temporarily unreadable.  The head is rewrapped around 5:45 in the morning and the study quality is then again excellent.  No additional episodes are reported through the end of the study.     Impression:   EEG background is normal in the awake and asleep states No focal features or epileptiform activity are seen A single episode of coughing followed by brief loss of consciousness has no EEG correlate No electrographic seizures are seen Summary: Over the entirety of this recording, no evidence for epilepsy is seen Findings communicated to the consultant neurologist This report is transcribed using the Dragon dictation system.     EEG Continuous Monitoring With Video    Result Date: 11/10/2024  Date of Procedure: 11-9 at 6:30 PM through 11-10 at 11 AM Reason for referral: 49 y.o.male with recurrent episodes of syncope, altered mental status, consideration of seizures Technical summary: A 19 channel digital EEG is performed using the international 10-20 placement system, including eye leads and EKG leads.  Video is available, and split screen technology with video/EEG  correlation is used as needed.   A patient event button is offered. Review of the relevant video-EEG data was performed throughout the day.  Detailed review of the EEG and relevant video was performed using multiple montages, including a variety of referential and bipolar montages.  Results of the monitoring related to the treatment team frequently during the study, at least once a day.  Where available, seizure detection software was used for the detection of ictal and interictal discharges. Findings: The patient is awake at the beginning of the study.  The background shows diffuse low amplitude theta and alpha activity present symmetrically over both hemispheres.  At times a 10 Hz posterior rhythm is evident over the occipital leads.  EMG and eye blink artifact are present anteriorly.  Overnight, sleep is seen with slowing of the background, and sleep spindles.  In the early morning hours, electrode artifact is present in the occipital leads and this becomes quite prominent in the last hour of the recording.  The patient's head is rewrapped shortly before 11 AM, and the study is again of good technical quality.  Over the course of the study.  No focal features or epileptiform activity are seen.  The patient event button is not pushed.  No clinical or electrographic seizures are seen.     Impression:   Normal EEG in the awake and asleep states No focal features or epileptiform activity are seen No clinical or electrographic seizures are captured This report is transcribed using the Dragon dictation system.     EEG    Result Date: 11/9/2024  Reason for referral: 49 y.o.male recurrent syncope, consideration of seizures Technical Summary:  A 19 channel digital EEG was performed using the international 10-20 placement system, including eye leads and EKG leads. Duration: 20 minutes Findings: The patient is awake.  Diffuse low amplitude intermixed theta and alpha activity is present symmetrically over both hemispheres.   At times the tenderness posterior rhythm is evident.  Photic stimulation is a symmetric driving response.  Hyperventilation is not performed.  Stage II sleep is not seen.  No focal features or epileptiform activity are noted Video: Available Technical quality: Good EKG: Regular, 60 bpm SUMMARY: Normal EEG in the awake state No focal features or epileptiform activity are seen     Impression: Normal study This report is transcribed using the Dragon dictation system.       Results for orders placed during the hospital encounter of 11/08/24    Adult Transthoracic Echo Complete w/ Color, Spectral and Contrast (If Necessary Per Protocol)    Interpretation Summary    Left ventricular systolic function is normal. Calculated left ventricular 3D EF = 63% Left ventricular ejection fraction appears to be 61 - 65%.    Trace mitral valve regurgitation is present.    Normal left atrial size and volume noted.      Current medications:  Scheduled Meds:ipratropium-albuterol, 3 mL, Nebulization, 4x Daily - RT  nicotine, 1 patch, Transdermal, Q24H  oxymetazoline, 2 spray, Each Nare, Once  pantoprazole, 40 mg, Oral, Q AM  sodium chloride, 10 mL, Intravenous, Q12H      Continuous Infusions:   PRN Meds:.  acetaminophen    benzonatate    phenol    promethazine-codeine    sodium chloride    sodium chloride    sodium chloride    Assessment & Plan   Assessment & Plan     Active Hospital Problems    Diagnosis  POA    **Syncope [R55]  Yes    Transient amnesia [R41.3]  Yes      Resolved Hospital Problems   No resolved problems to display.        Brief Hospital Course to date:  Hussain Feliz is a 49 y.o. male with no significant PMHx presents with episodes of syncope and disorientation in the setting of a viral infection/bronchitis being treated with abx and steroids    Syncope  --seems to be possible vasovagal or laryngeal spasm.  --cardiology recommending 14 day monitor with cardiology follow up  --Neurology is signing off with no evidence  of seizures  --ENT consult today-asking for SLP to eval as well.    Expected Discharge Location and Transportation: home  Expected Discharge   Expected Discharge Date: 11/12/2024; Expected Discharge Time:      VTE Prophylaxis:  Mechanical VTE prophylaxis orders are present.         AM-PAC 6 Clicks Score (PT): 24 (11/10/24 2130)    CODE STATUS:   Code Status and Medical Interventions: CPR (Attempt to Resuscitate); Full Support   Ordered at: 11/08/24 1219     Level Of Support Discussed With:    Patient     Code Status (Patient has no pulse and is not breathing):    CPR (Attempt to Resuscitate)     Medical Interventions (Patient has pulse or is breathing):    Full Support       Maryuri Mccoy MD  11/11/24

## 2024-11-12 ENCOUNTER — ANCILLARY PROCEDURE (OUTPATIENT)
Dept: SPEECH THERAPY | Facility: HOSPITAL | Age: 49
End: 2024-11-12
Payer: COMMERCIAL

## 2024-11-12 ENCOUNTER — READMISSION MANAGEMENT (OUTPATIENT)
Dept: CALL CENTER | Facility: HOSPITAL | Age: 49
End: 2024-11-12
Payer: COMMERCIAL

## 2024-11-12 VITALS
HEIGHT: 71 IN | SYSTOLIC BLOOD PRESSURE: 120 MMHG | OXYGEN SATURATION: 95 % | DIASTOLIC BLOOD PRESSURE: 80 MMHG | WEIGHT: 247.1 LBS | TEMPERATURE: 98.1 F | RESPIRATION RATE: 16 BRPM | BODY MASS INDEX: 34.59 KG/M2 | HEART RATE: 85 BPM

## 2024-11-12 DIAGNOSIS — R55 SYNCOPE AND COLLAPSE: Primary | ICD-10-CM

## 2024-11-12 PROBLEM — R41.3 TRANSIENT AMNESIA: Status: RESOLVED | Noted: 2024-11-08 | Resolved: 2024-11-12

## 2024-11-12 LAB — PYRIDOXAL PHOS SERPL-MCNC: 31.8 UG/L (ref 3.4–65.2)

## 2024-11-12 PROCEDURE — 94799 UNLISTED PULMONARY SVC/PX: CPT

## 2024-11-12 PROCEDURE — 94664 DEMO&/EVAL PT USE INHALER: CPT

## 2024-11-12 PROCEDURE — 94761 N-INVAS EAR/PLS OXIMETRY MLT: CPT

## 2024-11-12 PROCEDURE — 92507 TX SP LANG VOICE COMM INDIV: CPT

## 2024-11-12 PROCEDURE — 92610 EVALUATE SWALLOWING FUNCTION: CPT

## 2024-11-12 PROCEDURE — G0378 HOSPITAL OBSERVATION PER HR: HCPCS

## 2024-11-12 PROCEDURE — 92612 ENDOSCOPY SWALLOW (FEES) VID: CPT

## 2024-11-12 PROCEDURE — 99239 HOSP IP/OBS DSCHRG MGMT >30: CPT | Performed by: INTERNAL MEDICINE

## 2024-11-12 RX ORDER — CLOTRIMAZOLE 10 MG/1
10 LOZENGE ORAL
Qty: 35 TABLET | Refills: 0 | Status: SHIPPED | OUTPATIENT
Start: 2024-11-12 | End: 2024-11-19

## 2024-11-12 RX ORDER — PSEUDOEPHEDRINE HCL 120 MG/1
120 TABLET, FILM COATED, EXTENDED RELEASE ORAL 2 TIMES DAILY
Qty: 10 TABLET | Refills: 0 | Status: SHIPPED | OUTPATIENT
Start: 2024-11-12 | End: 2024-11-17

## 2024-11-12 RX ORDER — BENZONATATE 100 MG/1
200 CAPSULE ORAL 3 TIMES DAILY PRN
Qty: 30 CAPSULE | Refills: 0 | Status: SHIPPED | OUTPATIENT
Start: 2024-11-12 | End: 2024-12-04

## 2024-11-12 RX ORDER — PANTOPRAZOLE SODIUM 40 MG/1
40 TABLET, DELAYED RELEASE ORAL DAILY
Qty: 30 TABLET | Refills: 0 | Status: SHIPPED | OUTPATIENT
Start: 2024-11-12 | End: 2024-11-14 | Stop reason: SDUPTHER

## 2024-11-12 RX ORDER — CLOTRIMAZOLE 10 MG/1
10 LOZENGE ORAL
Status: DISCONTINUED | OUTPATIENT
Start: 2024-11-12 | End: 2024-11-12 | Stop reason: HOSPADM

## 2024-11-12 RX ORDER — PROMETHAZINE HYDROCHLORIDE AND CODEINE PHOSPHATE 6.25; 1 MG/5ML; MG/5ML
5 SYRUP ORAL EVERY 4 HOURS PRN
Qty: 180 ML | Refills: 0 | Status: SHIPPED | OUTPATIENT
Start: 2024-11-12 | End: 2024-11-19

## 2024-11-12 RX ORDER — PANTOPRAZOLE SODIUM 40 MG/1
40 TABLET, DELAYED RELEASE ORAL
Status: DISCONTINUED | OUTPATIENT
Start: 2024-11-12 | End: 2024-11-12 | Stop reason: HOSPADM

## 2024-11-12 RX ORDER — PANTOPRAZOLE SODIUM 40 MG/1
40 TABLET, DELAYED RELEASE ORAL
Status: DISCONTINUED | OUTPATIENT
Start: 2024-11-13 | End: 2024-11-12

## 2024-11-12 RX ORDER — PSEUDOEPHEDRINE HCL 120 MG/1
120 TABLET, FILM COATED, EXTENDED RELEASE ORAL 2 TIMES DAILY
Status: DISCONTINUED | OUTPATIENT
Start: 2024-11-12 | End: 2024-11-12 | Stop reason: HOSPADM

## 2024-11-12 RX ADMIN — IPRATROPIUM BROMIDE AND ALBUTEROL SULFATE 3 ML: 2.5; .5 SOLUTION RESPIRATORY (INHALATION) at 09:43

## 2024-11-12 RX ADMIN — PANTOPRAZOLE SODIUM 40 MG: 40 TABLET, DELAYED RELEASE ORAL at 05:27

## 2024-11-12 NOTE — MBS/VFSS/FEES
Acute Care - Speech Language Pathology   Swallow Initial Evaluation [facility]  Clinical Swallow Evaluation  Fiberoptic Endoscopic Evaluation of Swallowing (FEES)       Patient Name: Hussain Feliz  : 1975  MRN: [id number]  Today's Date: 2024               Admit Date: 2024    Visit Dx:     ICD-10-CM ICD-9-CM   1. Syncope and collapse  R55 780.2   2. Vocal cord dysfunction  J38.3 478.5   3. GREGOR (obstructive sleep apnea)  G47.33 327.23   4. Gastroesophageal reflux disease, unspecified whether esophagitis present  K21.9 530.81     Patient Active Problem List   Diagnosis    Abnormal blood level of iron    GERD (gastroesophageal reflux disease)    Vocal cord dysfunction    GREGOR (obstructive sleep apnea)    Cigarette nicotine dependence without complication     Past Medical History:   Diagnosis Date    Heart burn      History reviewed. No pertinent surgical history.    SLP Recommendation and Plan  SLP Swallowing Diagnosis: functional oral phase, functional pharyngeal phase, esophageal dysphagia (24)  SLP Diet Recommendation: regular textures, thin liquids (24)  Recommended Precautions and Strategies: upright posture during/after eating, small bites of food and sips of liquid, reflux precautions (24)  SLP Rec. for Method of Medication Administration: meds whole, as tolerated (24)     Monitor for Signs of Aspiration: yes, notify SLP if any concerns (24)     Swallow Criteria for Skilled Therapeutic Interventions Met: baseline status (24)  Anticipated Discharge Disposition (SLP): home (24)  Rehab Potential/Prognosis, Swallowing: good, to achieve stated therapy goals (24)  Therapy Frequency (Swallow): evaluation only (24)     Oral Care Recommendations: Oral Care BID/PRN (24)                                               SWALLOW EVALUATION (Last 72 Hours)       SLP Adult Swallow Evaluation        Row Name 11/12/24 1100                   Rehab Evaluation    Document Type re-evaluation  -AV        Subjective Information complains of;weakness;fatigue  -AV        Patient Observations alert;cooperative  -AV        Patient/Family/Caregiver Comments/Observations wife present  -AV        Patient Effort good  -AV           Pain    Pretreatment Pain Rating 0/10 - no pain  -AV        Posttreatment Pain Rating 0/10 - no pain  -AV           Oral Motor Structure and Function    Dentition Assessment natural, present and adequate  -AV        Secretion Management WNL/WFL  -AV        Mucosal Quality moist, healthy  -AV        Gag Response WFL  -AV        Volitional Swallow WFL  -AV        Volitional Cough WFL  -AV           Oral Musculature and Cranial Nerve Assessment    Oral Motor General Assessment WFL  -AV           General Eating/Swallowing Observations    Respiratory Support Currently in Use room air  -AV        Eating/Swallowing Skills fed by SLP;self-fed  -AV        Positioning During Eating upright 90 degree  -AV        Utensils Used spoon;cup;straw  -AV        Consistencies Trialed regular textures;pureed;thin liquids  -AV           Clinical Swallow Eval    Oral Prep Phase WFL  -AV        Oral Transit WFL  -AV        Oral Residue WFL  -AV        Pharyngeal Phase suspected pharyngeal impairment  -AV        Esophageal Phase suspected esophageal impairment  -AV        Clinical Swallow Evaluation Summary pt coughed so much with milk,, passed out. Wife would like FEES  -AV           Fiberoptic Endoscopic Evaluation of Swallowing (FEES)    Risks/Benefits Reviewed risks/benefits explained;patient;family;agreed to eval  -AV        Nasal Entry left:  -AV        Scope serial number/identification [id number]  -AV           Anatomy and Physiology    Anatomic Considerations edema;erythema  -AV        Velopharyngeal WFL  -AV        Base of Tongue symmetrical;range adequate  -AV        Epiglottis omega shape;rests posteriorly  -AV         Laryngeal Function Breathing symmetrical  -AV        Laryngeal Function Phonation symmetrical  -AV        Laryngeal Function to Breath Hold sustained closure;other (see comments)  over sustaining closure at times (unable to reopen cords)  -AV        Secretion Rating Scale (Cas et al. 1996) 0- normal, no visible secretions  -AV        Secretion Description thin;clear  -AV        Ice Chips DNA  -AV        Spontaneous Swallow frequency reduced;cleared secretions  -AV        Sensory sensed scope  -AV        Utensils Used Spoon;Cup;Straw  -AV        Consistencies Trialed thin liquids;pudding/puree;regular textures  -AV           FEES Interpretation    Oral Phase WFL  -AV           Initiation of Pharyngeal Swallow    Initiation of Pharyngeal Swallow WFL  -AV        Pharyngeal Phase functional pharyngeal phase of swallow  -AV        Pharyngeal Phase, Comment pt did have some residue after but cleared after 2-3 subsequent swallows (uncued every time).  -AV        FEES Summary continuous reflux after each trial throughout study.  Risk of penetration with refluxate.  coughing noted during study with no pen/asp material noted. Significant edema/erythema during study.  Patient would benefit from outpatient SLP Services for PVFM  -AV           SLP Evaluation Clinical Impression    SLP Swallowing Diagnosis functional oral phase;functional pharyngeal phase;esophageal dysphagia  -AV        Functional Impact risk of aspiration/pneumonia  -AV        Rehab Potential/Prognosis, Swallowing good, to achieve stated therapy goals  -AV        Swallow Criteria for Skilled Therapeutic Interventions Met baseline status  -AV           Recommendations    Therapy Frequency (Swallow) evaluation only  -AV        SLP Diet Recommendation regular textures;thin liquids  -AV        Recommended Precautions and Strategies upright posture during/after eating;small bites of food and sips of liquid;reflux precautions  -AV        Oral Care Recommendations  Oral Care BID/PRN  -AV        SLP Rec. for Method of Medication Administration meds whole;as tolerated  -AV        Monitor for Signs of Aspiration yes;notify SLP if any concerns  -AV        Anticipated Discharge Disposition (SLP) home  -AV                  User Key  (r) = Recorded By, (t) = Taken By, (c) = Cosigned By      Initials Name Effective Dates    AV Odom Gilbertisela Raya, MS CCC-SLP 06/16/21 -                     EDUCATION  The patient has been educated in the following areas:   Voice Disorder Dysphagia (Swallowing Impairment) Oral Care/Hydration.        SLP GOALS       Row Name 11/12/24 1100 11/11/24 1400          SLP VOICE GOALS    SLP PVFM SHORT TERM GOAL Pt will demonstrate improved breathing pattern in order to reduce tension in the laryngeal area by using diaphragmatic breathing at rest/during phonation;Pt will demonstrate improved breathing pattern to reduce tension in laryngeal area by using slow exhalation;Pt will demonstrate improved breathing pattern to reduce tension in laryngeal area by using sniff inhalation;Pt will demonstrate improved breathing pattern to reduce tension in laryngeal area by using open throat inhalation;Pt will perform laryngeal and cervical relaxation exercises to improve muscle awareness and reduce cervical and laryngeal muscle tension;Pt will utilize techniques to improve airflow during breathing and speech tasks;Pt will perform relaxed throat breathing exercises and/or facilitative techniques to inhibit laryngeal spasm and maintain an open airway  -AV Pt will demonstrate improved breathing pattern in order to reduce tension in the laryngeal area by using diaphragmatic breathing at rest/during phonation;Pt will demonstrate improved breathing pattern to reduce tension in laryngeal area by using slow exhalation;Pt will demonstrate improved breathing pattern to reduce tension in laryngeal area by using sniff inhalation;Pt will demonstrate improved breathing pattern to  reduce tension in laryngeal area by using open throat inhalation;Pt will perform laryngeal and cervical relaxation exercises to improve muscle awareness and reduce cervical and laryngeal muscle tension;Pt will utilize techniques to improve airflow during breathing and speech tasks;Pt will perform relaxed throat breathing exercises and/or facilitative techniques to inhibit laryngeal spasm and maintain an open airway  -RS     SLP PVFM LONG TERM GOAL Pt will be able to participate in activities of daily living/physical activity without experiencing restricted breathing due to PVFM  -AV Pt will be able to participate in activities of daily living/physical activity without experiencing restricted breathing due to PVFM  -RS        PVFM Long Term Goals Participate without experiencing restricted breathing    Time Frame (PVFM Participate in activities of Daily Living) 1 week  -AV 1 week  -RS     Progress/Outcomes (PVFM ability to participate in activities of daily living/physical activity) continuing progress toward goal  -AV new goal  -RS        PVFM Short Term Goal Improve breathing pattern    Time Frame (PVFM I using diaphragmatic breathing at rest/during phonation) 1 week  -AV 1 week  -RS     Progress/Outcomes (PVFM toward using diaphragmatic breathing at rest/during phonation) continuing progress toward goal  -AV new goal  -RS        PVFM Short Term Goal Pt to improve breathing by using slow exhalation    Time Frame (PVFM I using slow exhalation) 1 week  -AV 1 week  -RS     Progress/Outcomes (PVFM toward using slow exhalation) continuing progress toward goal  -AV new goal  -RS        PVFM Short Term Goal Pt to improve breathing by using sniff inhalation    Time Frame (PVFM I using sniff inhalation) 1 week  -AV 1 week  -RS     Progress/Outcomes (PVFM toward using sniff inhalation) continuing progress toward goal  -AV new goal  -RS        PVFM Short Term Goal Pt to improve breathing by using open throat inhalation     Time Frame (PVFM I using open throat inhalation) 1 week  -AV 1 week  -RS     Progress/Outcomes (PVFM toward using open throat inhalation) continuing progress toward goal  -AV new goal  -RS        PVFM Short Term Goal Pt to perform laryngeal and cervical relaxation exercises    Time Frame (PVFM using laryngeal and cervical relaxation exercises) 1 week  -AV 1 week  -RS     Progress/Outcomes (PVFM toward using laryngeal and cervical relaxation exercises) continuing progress toward goal  -AV new goal  -RS        PVFM Short Term Goal Pt to improve breathing by using techniques to improve airflow during breathing and speech tasks    Time Frame (PVFM I using techniques to improve airflow) 1 week  -AV 1 week  -RS     Progress/Outcomes (PVFM toward using techniques to improve airflow during breathing and speech tasks) continuing progress toward goal  -AV new goal  -RS        PVFM Short Term Goal Pt to inhibit laryngeal spasm and maintain an open airway    Time Frame (PVFM using techniques using relaxed throat breathing exercises) 1 week  -AV 1 week  -RS     Progress/Outcomes (PVFM toward performing relaxed throat breathing exercises) continuing progress toward goal  -AV new goal  -RS               User Key  (r) = Recorded By, (t) = Taken By, (c) = Cosigned By      Initials Name Provider Type    AV Raya Stevens, MS CCC-SLP Speech and Language Pathologist    Valente Stafford MS CCC-SLP Speech and Language Pathologist                         Time Calculation:    Time Calculation- SLP       Row Name 11/12/24 8207             Time Calculation- SLP    SLP Start Time 1100  -AV      SLP Received On 11/12/24  -AV         Untimed Charges    SLP Eval/Re-eval  ST Eval Oral Pharyng Swallow - 62357;ST Fiberoptic Endo Eval Swallow - 87142  -AV      87080-GC Eval Oral Pharyng Swallow Minutes 30  -AV      34624-VQ Fiberoptic Endo Eval Swallow Minutes 45  -AV      22097-YG Treatment/ST Modification Prosth Aug Alter  45  -AV          Total Minutes    Untimed Charges Total Minutes 120  -AV       Total Minutes 120  -AV                User Key  (r) = Recorded By, (t) = Taken By, (c) = Cosigned By      Initials Name Provider Type    AV Raya Stevens, MS CCC-SLP Speech and Language Pathologist                    Therapy Charges for Today       Code Description Service Date Service Provider Modifiers Qty    01444898546 HC ST EVAL ORAL PHARYNG SWALLOW 2 11/12/2024 Raya Stevens MS CCC-SLP GN 1    34866383821 HC ST FIBEROPTIC ENDO EVAL SWALL 3 11/12/2024 Raya Stevens, MS CCC-SLP GN 1    [id number] HC ST TREATMENT SPEECH 3 11/12/2024 Raya Stevens, MS MCCARTNEY-SLP GN 1                 MS IRINA Ferreira  11/12/2024

## 2024-11-12 NOTE — DISCHARGE SUMMARY
[facility] Medicine Services  DISCHARGE SUMMARY    Patient Name: Hussain Feliz  : 1975  MRN: [id number]    Date of Admission: 2024 10:29 AM  Date of Discharge:  2024  Primary Care Physician: Hilda Beckett PA    Consults       Date and Time Order Name Status Description    2024  1:45 PM Inpatient Pulmonology Consult Completed     2024  7:20 AM Inpatient ENT Consult      2024 11:28 PM Inpatient Neurology Consult General Completed     2024 11:28 PM Inpatient Cardiology Consult Completed             Hospital Course     Presenting Problem: syncope    Active Hospital Problems    Diagnosis  POA    GERD (gastroesophageal reflux disease) [K21.9]  Yes    Vocal cord dysfunction [J38.3]  Yes    GREGOR (obstructive sleep apnea) [G47.33]  Yes    Cigarette nicotine dependence without complication [F17.210]  Yes      Resolved Hospital Problems    Diagnosis Date Resolved POA    **Syncope [R55] 2024 Yes    Transient amnesia [R41.3] 2024 Yes          Hospital Course:  Hussain Feliz is a 49 y.o. male with no significant PMHx presents with episodes of syncope and disorientation in the setting of a viral infection/bronchitis being treated with abx and steroids     Vocal cord dysfunction w/ syncope  Chronic cough  Tobacco use  --Patient underwent extensive evaluation upon arrival with CTA chest, stress test, echocardiogram, telemetry monitoring, EEG - all negative for etiology of symptoms.   --ENT and pulmonary were then consulted - flexible laryngoscopy was performed showing mild erythema/edema and moderate collapse/spasm of laryngeal walls. Symptoms ultimately felt to be due to vocal cord dysfunction. Treatment of underlying etiology was recommended.  --Add PPI at d/c and discussed nonmedical treatments of GERD with patient/family. Refer to [facility] as outpt for surveillance endoscopies.  --Home with decongestant, cough suppression. No driving while  still having these events.  --Holter monitor to be applied prior to d/c.    Discharge Follow Up Recommendations for outpatient labs/diagnostics:   PCP 1 week   Referral to [facility] and [facility] medicine at d/c.    Day of Discharge     HPI: Up in bed with wife in room. Had 1 episode yesterday am but none since and none this far today. Feels like cough is improving. Asking to go home.    Review of Systems  Gen- No fevers, chills  CV- No chest pain, palpitations  Resp- No cough, dyspnea  GI- No N/V/D, abd pain     Vital Signs:   Temp:  [97.8 °F (36.6 °C)-98.7 °F (37.1 °C)] 97.9 °F (36.6 °C)  Heart Rate:  [53-79] 68  Resp:  [16-18] 16  BP: (110-146)/(64-93) 117/84      Physical Exam:  Constitutional: No acute distress, awake, alert  HENT: NCAT, mucous membranes moist  Respiratory: Clear to auscultation bilaterally, respiratory effort normal   Cardiovascular: RRR, no murmurs, rubs, or gallops  Gastrointestinal: Positive bowel sounds, soft, nontender, nondistended  Musculoskeletal: No bilateral ankle edema  Psychiatric: Appropriate affect, cooperative  Neurologic: Oriented x 3, strength symmetric in all extremities, Cranial Nerves grossly intact to confrontation, speech clear  Skin: No rashes     Pertinent  and/or Most Recent Results     LAB RESULTS:      Lab 11/09/24  0505 11/08/24  1034   WBC 7.25 7.04   HEMOGLOBIN 16.7 18.0*   HEMATOCRIT 47.4 51.5*   PLATELETS 232 253   NEUTROS ABS  --  3.54   IMMATURE GRANS (ABS)  --  0.04   LYMPHS ABS  --  2.54   MONOS ABS  --  0.53   EOS ABS  --  0.35   MCV 92.0 91.5   CRP  --  <0.30   LACTATE  --  1.0         Lab 11/09/24  0505 11/08/24  1034   SODIUM 143 143   POTASSIUM 3.8 4.2   CHLORIDE 110* 106   CO2 22.0 30.3*   ANION GAP 11.0 6.7   BUN 13 13   CREATININE 0.97 1.12   EGFR 96.3 81.0   GLUCOSE 106* 103*   CALCIUM 8.3* 8.9   MAGNESIUM  --  2.2         Lab 11/09/24  0505 11/08/24  1034   TOTAL PROTEIN 6.2 6.8   ALBUMIN 3.8 4.3   GLOBULIN 2.4 2.5   ALT (SGPT) 38 41   AST (SGOT) 26  32   BILIRUBIN 0.7 0.8   ALK PHOS 75 79         Lab 11/09/24  0505 11/08/24  1803 11/08/24  1436 11/08/24  1034   PROBNP  --   --   --  <36.0   HSTROP T 10 9 8 10             Lab 11/09/24  0505   VITAMIN B 12 582         Lab 11/08/24  1519   PH, ARTERIAL 7.408   PCO2, ARTERIAL 40.1   PO2 ART 62.9*   FIO2 21   HCO3 ART 25.3   BASE EXCESS ART 0.6   CARBOXYHEMOGLOBIN 0.9     Brief Urine Lab Results  (Last result in the past 365 days)        Color   Clarity   Blood   Leuk Est   Nitrite   Protein   CREAT   Urine HCG        11/08/24 1555 Yellow   Clear   Negative   Negative   Negative   Negative                 Microbiology Results (last 10 days)       Procedure Component Value - Date/Time    Respiratory Panel PCR w/COVID-19(SARS-CoV-2) NATANAEL/YAA/LUCIE/PAD/COR/JOSE In-House, NP Swab in UTM/VTM, 2 HR TAT - Swab, Nasopharynx [[id number]]  (Normal) Collected: 11/08/24 1436    Lab Status: Final result Specimen: Swab from Nasopharynx Updated: 11/08/24 1532     ADENOVIRUS, PCR Not Detected     Coronavirus 229E Not Detected     Coronavirus HKU1 Not Detected     Coronavirus NL63 Not Detected     Coronavirus OC43 Not Detected     COVID19 Not Detected     Human Metapneumovirus Not Detected     Human Rhinovirus/Enterovirus Not Detected     Influenza A PCR Not Detected     Influenza B PCR Not Detected     Parainfluenza Virus 1 Not Detected     Parainfluenza Virus 2 Not Detected     Parainfluenza Virus 3 Not Detected     Parainfluenza Virus 4 Not Detected     RSV, PCR Not Detected     Bordetella pertussis pcr Not Detected     Bordetella parapertussis PCR Not Detected     Chlamydophila pneumoniae PCR Not Detected     Mycoplasma pneumo by PCR Not Detected    Narrative:      In the setting of a positive respiratory panel with a viral infection PLUS a negative procalcitonin without other underlying concern for bacterial infection, consider observing off antibiotics or discontinuation of antibiotics and continue supportive care. If the  respiratory panel is positive for atypical bacterial infection (Bordetella pertussis, Chlamydophila pneumoniae, or Mycoplasma pneumoniae), consider antibiotic de-escalation to target atypical bacterial infection.    Respiratory Panel PCR w/COVID-19(SARS-CoV-2) NATANAEL/YAA/LUCIE/PAD/COR/JOSE In-House, NP Swab in UTM/VTM, 2 HR TAT - Swab, Nasopharynx [[id number]]  (Normal) Collected: 11/04/24 1052    Lab Status: Final result Specimen: Swab from Nasopharynx Updated: 11/04/24 1145     ADENOVIRUS, PCR Not Detected     Coronavirus 229E Not Detected     Coronavirus HKU1 Not Detected     Coronavirus NL63 Not Detected     Coronavirus OC43 Not Detected     COVID19 Not Detected     Human Metapneumovirus Not Detected     Human Rhinovirus/Enterovirus Not Detected     Influenza A PCR Not Detected     Influenza B PCR Not Detected     Parainfluenza Virus 1 Not Detected     Parainfluenza Virus 2 Not Detected     Parainfluenza Virus 3 Not Detected     Parainfluenza Virus 4 Not Detected     RSV, PCR Not Detected     Bordetella pertussis pcr Not Detected     Bordetella parapertussis PCR Not Detected     Chlamydophila pneumoniae PCR Not Detected     Mycoplasma pneumo by PCR Not Detected    Narrative:      In the setting of a positive respiratory panel with a viral infection PLUS a negative procalcitonin without other underlying concern for bacterial infection, consider observing off antibiotics or discontinuation of antibiotics and continue supportive care. If the respiratory panel is positive for atypical bacterial infection (Bordetella pertussis, Chlamydophila pneumoniae, or Mycoplasma pneumoniae), consider antibiotic de-escalation to target atypical bacterial infection.            EEG Continuous Monitoring With Video    Result Date: 11/11/2024  Date of Procedure: 11-10 at 11 AM through 11-11 at 7:45 AM Reason for referral: 49 y.o.male with recurrent episodes of loss of consciousness, consideration of seizures, seizure classification  Technical summary: A 19 channel digital EEG is performed using the international 10-20 placement system, including eye leads and EKG leads.  Video is available, and split screen technology with video/EEG correlation is used as needed.   A patient event button is offered. Review of the relevant video-EEG data was performed throughout the day.  Detailed review of the EEG and relevant video was performed using multiple montages, including a variety of referential and bipolar montages.  Results of the monitoring related to the treatment team frequently during the study, at least once a day.  Where available, seizure detection software was used for the detection of ictal and interictal discharges. Findings: At the beginning of the study, the patient is awake and resting comfortably in bed.  Diffuse low amplitude alpha activity is present symmetrically over both hemispheres.  EMG and eye blink artifact are noted anteriorly.  At times a well-regulated 9 Hz posterior rhythm is evident symmetrically over the occipital leads.  Shortly after study onset, around 11:30 in the morning, the patient has an episode of coughing.  He is observed to be sitting up in bed and there is movement artifact on the EEG.  He then by report has a brief episode of loss of consciousness or altered mental status.  During this time, no changes in the EEG are seen.  No rhythmic or periodic discharges are noted.  No focal slowing is appreciated and no epileptiform activity is seen.  Over the course of the day, although the patient has additional coughing spells, there are no further associated episodes of altered mental status.  Overnight, sleep is seen with slowing of the background and vertex waves.  Around 3 AM, significant electrode artifact becomes apparent and the study becomes temporarily unreadable.  The head is rewrapped around 5:45 in the morning and the study quality is then again excellent.  No additional episodes are reported through the  end of the study.       EEG background is normal in the awake and asleep states No focal features or epileptiform activity are seen A single episode of coughing followed by brief loss of consciousness has no EEG correlate No electrographic seizures are seen Summary: Over the entirety of this recording, no evidence for epilepsy is seen Findings communicated to the consultant neurologist This report is transcribed using the Dragon dictation system.     EEG Continuous Monitoring With Video    Result Date: 11/10/2024  Date of Procedure: 11-9 at 6:30 PM through 11-10 at 11 AM Reason for referral: 49 y.o.male with recurrent episodes of syncope, altered mental status, consideration of seizures Technical summary: A 19 channel digital EEG is performed using the international 10-20 placement system, including eye leads and EKG leads.  Video is available, and split screen technology with video/EEG correlation is used as needed.   A patient event button is offered. Review of the relevant video-EEG data was performed throughout the day.  Detailed review of the EEG and relevant video was performed using multiple montages, including a variety of referential and bipolar montages.  Results of the monitoring related to the treatment team frequently during the study, at least once a day.  Where available, seizure detection software was used for the detection of ictal and interictal discharges. Findings: The patient is awake at the beginning of the study.  The background shows diffuse low amplitude theta and alpha activity present symmetrically over both hemispheres.  At times a 10 Hz posterior rhythm is evident over the occipital leads.  EMG and eye blink artifact are present anteriorly.  Overnight, sleep is seen with slowing of the background, and sleep spindles.  In the early morning hours, electrode artifact is present in the occipital leads and this becomes quite prominent in the last hour of the recording.  The patient's head is  rewrapped shortly before 11 AM, and the study is again of good technical quality.  Over the course of the study.  No focal features or epileptiform activity are seen.  The patient event button is not pushed.  No clinical or electrographic seizures are seen.       Normal EEG in the awake and asleep states No focal features or epileptiform activity are seen No clinical or electrographic seizures are captured This report is transcribed using the Dragon dictation system.     EEG    Result Date: 11/9/2024  Reason for referral: 49 y.o.male recurrent syncope, consideration of seizures Technical Summary:  A 19 channel digital EEG was performed using the international 10-20 placement system, including eye leads and EKG leads. Duration: 20 minutes Findings: The patient is awake.  Diffuse low amplitude intermixed theta and alpha activity is present symmetrically over both hemispheres.  At times the tenderness posterior rhythm is evident.  Photic stimulation is a symmetric driving response.  Hyperventilation is not performed.  Stage II sleep is not seen.  No focal features or epileptiform activity are noted Video: Available Technical quality: Good EKG: Regular, 60 bpm SUMMARY: Normal EEG in the awake state No focal features or epileptiform activity are seen     Normal study This report is transcribed using the Dragon dictation system.      MRI Brain Without Contrast    Result Date: 11/8/2024  MRI BRAIN WO CONTRAST Date of Exam: 11/8/2024 3:13 PM EST Indication: syncope.  Comparison: CT same day Technique:  Routine multiplanar/multisequence sequence images of the brain were obtained without contrast administration. Findings: Diffusion-weighted imaging demonstrates no acute restriction abnormality. Midline structures of the brain are grossly unremarkable in appearance. Pituitary and sella structures and craniocervical junction are grossly unremarkable in their appearance. The ventricles, cisterns and sulci appear within normal  limits. No acute intracranial hemorrhage or mass effect is noted. Gray and white matter signal characteristics appear within normal limits. Major intracranial flow voids appear grossly patent. Please see CT angiogram same day for further evaluation. Mild mucosal thickening of the paranasal sinuses noted. Globes and orbits appear grossly unremarkable in their appearance. Mastoid air cells appear to be grossly clear. Cerebral pontine angle structures and inner ear structures appear grossly unremarkable in appearance.     Impression: No acute intracranial abnormality Electronically Signed: Cristian Richards MD  11/8/2024 3:51 PM EST  Workstation ID: [id number]    Treadmill Stress Test    Result Date: 11/8/2024    Pt denied chest pain/discomfort during exercise. Pt did c/o SOA (SP02 WNL on RA) that resolved in recovery.   THR of 146 achieved at 8:40.   Expected exercise duration = 10:40. Actual exercise duration = 8:50. Pt stopped d/t leg fatigue.   DTS of 15.8.   SR-ST with rare PVC in recovery.   No ST-T wave changes noted.   No ECG evidence of myocardial ischemia.   Negative clinical evidence of myocardial ischemia.   Findings consistent with a normal ECG stress test.     CT Angiogram Neck    Result Date: 11/8/2024  CT ANGIOGRAM NECK, CT ANGIOGRAM HEAD Date of Exam: 11/8/2024 12:54 PM EST Indication: syncope. Comparison: CT head from earlier today Technique: CTA of the head and neck was performed before and after the uneventful intravenous administration of 75 mL Isovue-370. Reconstructed coronal and sagittal images were also obtained. In addition, a 3-D volume rendered image was created for interpretation. Automated exposure control and iterative reconstruction methods were used. Findings: There is a three-vessel aortic arch. The right common carotid artery is widely patent with mild plaque distally. The right carotid bifurcation is widely patent. The right internal carotid artery is widely patent. The left common  carotid artery is widely patent. The left carotid bifurcation is widely patent. The left internal carotid artery is widely patent. The right vertebral artery is widely patent. The left vertebral artery is widely patent. The left vertebral artery is dominant. The bilateral middle cerebral, bilateral anterior cerebral, and anterior communicating arteries are widely patent. The basilar and bilateral posterior cerebral arteries are widely patent. There are patent bilateral posterior communicating arteries. No intracranial aneurysm is identified. The visualized portions of the bilateral superior cerebellar, anteroinferior cerebellar, and posterior inferior cerebellar arteries are unremarkable.     Impression: Major arterial vasculature within head and neck appears widely patent, with no hemodynamically significant stenosis, dissection, thrombus, or aneurysm. Electronically Signed: Luis Dennis MD  11/8/2024 1:29 PM EST  Workstation ID: [id number]    CT Angiogram Head    Result Date: 11/8/2024  CT ANGIOGRAM NECK, CT ANGIOGRAM HEAD Date of Exam: 11/8/2024 12:54 PM EST Indication: syncope. Comparison: CT head from earlier today Technique: CTA of the head and neck was performed before and after the uneventful intravenous administration of 75 mL Isovue-370. Reconstructed coronal and sagittal images were also obtained. In addition, a 3-D volume rendered image was created for interpretation. Automated exposure control and iterative reconstruction methods were used. Findings: There is a three-vessel aortic arch. The right common carotid artery is widely patent with mild plaque distally. The right carotid bifurcation is widely patent. The right internal carotid artery is widely patent. The left common carotid artery is widely patent. The left carotid bifurcation is widely patent. The left internal carotid artery is widely patent. The right vertebral artery is widely patent. The left vertebral artery is widely patent. The left  vertebral artery is dominant. The bilateral middle cerebral, bilateral anterior cerebral, and anterior communicating arteries are widely patent. The basilar and bilateral posterior cerebral arteries are widely patent. There are patent bilateral posterior communicating arteries. No intracranial aneurysm is identified. The visualized portions of the bilateral superior cerebellar, anteroinferior cerebellar, and posterior inferior cerebellar arteries are unremarkable.     Impression: Major arterial vasculature within head and neck appears widely patent, with no hemodynamically significant stenosis, dissection, thrombus, or aneurysm. Electronically Signed: Luis Dennis MD  11/8/2024 1:29 PM EST  Workstation ID: [id number]    CT Head Without Contrast    Result Date: 11/8/2024  CT HEAD WO CONTRAST Date of Exam: 11/8/2024 12:54 PM EST Indication: syncope. Comparison: None available. Technique: Axial CT images were obtained of the head without contrast administration.  Automated exposure control and iterative construction methods were used. Findings: No acute intracranial hemorrhage or extra-axial collection is identified. The ventricles appear normal in caliber, with no evidence of mass effect or midline shift. The basal cisterns appear patent. The gray-white differentiation appears preserved. The calvarium appear intact. The paranasal sinuses are clear. The mastoid air cells are well-aerated.     Impression: No acute intracranial process identified. Electronically Signed: Luis Dennis MD  11/8/2024 1:18 PM EST  Workstation ID: [id number]    CT Angiogram Chest    Result Date: 11/8/2024  CT ANGIOGRAM CHEST Date of Exam: 11/8/2024 11:15 AM EST Indication: syncope, concern for PE. Comparison: Chest x-ray from today Technique: CTA of the chest was performed after the uneventful intravenous administration of 85 mL Isovue-370. Reconstructed coronal and sagittal images were also obtained. In addition, a 3-D volume rendered  image was created for interpretation. Automated exposure control and iterative reconstruction methods were used. FINDINGS: Thoracic inlet: Unremarkable. Pulmonary arteries: No filling defects are identified within the pulmonary arteries to suggest acute pulmonary embolism. Great vessels: The thoracic aorta and proximal arch vessels appear unremarkable. Mediastinum/Katie: No pathologically enlarged mediastinal lymph nodes are seen. The esophagus is unremarkable. Lung parenchyma: Left upper lobe granuloma. Lungs appear otherwise adequately aerated. No acute infiltrate is seen. No suspicious pulmonary nodules are seen. Trachea and airways: The trachea and central airways appear unremarkable. Pleural space: No significant pleural effusion or pneumothorax. Heart and pericardium: The heart and pericardium appear unremarkable. Chest wall: No acute or suspicious osseous or soft tissue lesion is identified. Upper abdomen: No acute abnormality is identified within the visualized upper abdomen. There are several subcentimeter liver hypodensities which are too small to characterize.     1.No acute abnormality is identified within the thorax. Specifically, there is no evidence of acute pulmonary embolism. 2.Please see above for additional details. Electronically Signed: Cas Orozco MD  11/8/2024 11:35 AM EST  Workstation ID: [id number]    XR Chest 1 View    Result Date: 11/8/2024  XR CHEST 1 VW Date of Exam: 11/8/2024 10:54 AM EST Indication: SOA triage protocol Comparison: 11/4/2024 Findings: Low lung volumes. Heart size and pulmonary vasculature are within normal limits. Mild atelectasis and vascular crowding in the lung bases due to low lung volumes. No suspicious infiltrate or edema. Calcified granuloma in the peripheral left midlung. Costophrenic angle sharp     Impression: Hypoinspiratory film demonstrating no acute cardiopulmonary process Electronically Signed: Ranjith Garcia  11/8/2024 11:08 AM EST  Workstation ID:  [id number]    XR Chest 2 View    Result Date: 11/4/2024  XR CHEST 2 VW Date of Exam: 11/4/2024 11:16 AM EST Indication: pna? Comparison: None available. Findings: Heart mediastinum and pulmonary vasculature appear within normal limits. There is generalized pattern of coarsened and indistinct pulmonary interstitial markings and peribronchial thickening, suggesting bronchial inflammation. These changes are little denser in the left midlung than elsewhere, but appear to be diffuse on both images. No lung consolidation, effusion or pneumothorax is appreciated. A few calcified granulomas are noted.     Impression: Peribronchial thickening which may be chronic, as from asthma, or acute as from bronchitis or early bronchopneumonia. Changes are little greater in the left perihilar region than elsewhere. These correlate with patient's symptoms. Electronically Signed: Jaret Farooq MD  11/4/2024 1:08 PM EST  Workstation ID: [id number]             Results for orders placed during the hospital encounter of 11/08/24    Adult Transthoracic Echo Complete w/ Color, Spectral and Contrast (If Necessary Per Protocol)    Interpretation Summary    Left ventricular systolic function is normal. Calculated left ventricular 3D EF = 63% Left ventricular ejection fraction appears to be 61 - 65%.    Trace mitral valve regurgitation is present.    Normal left atrial size and volume noted.      Plan for Follow-up of Pending Labs/Results:   Pending Labs       Order Current Status    Vitamin B1, Whole Blood In process    Vitamin B6 In process          Discharge Details        Discharge Medications        New Medications        Instructions Start Date   benzonatate 100 MG capsule  Commonly known as: TESSALON   200 mg, Oral, 3 Times Daily PRN      pantoprazole 40 MG EC tablet  Commonly known as: PROTONIX  Replaces: omeprazole 20 MG capsule   40 mg, Oral, Daily      phenol 1.4 % liquid liquid  Commonly known as: CHLORASEPTIC   1 spray, Mouth/Throat, Every 2  Hours PRN      promethazine-codeine 6.25-10 MG/5ML syrup  Commonly known as: PHENERGAN with CODEINE   5 mL, Oral, Every 4 Hours PRN      pseudoephedrine 120 MG 12 hr tablet  Commonly known as: SUDAFED   120 mg, Oral, 2 Times Daily             Continue These Medications        Instructions Start Date   albuterol sulfate  (90 Base) MCG/ACT inhaler  Commonly known as: PROVENTIL HFA;VENTOLIN HFA;PROAIR HFA   2 puffs, Inhalation, Every 4 Hours PRN             Stop These Medications      azithromycin 250 MG tablet  Commonly known as: Zithromax Z-Chalo     methylPREDNISolone 4 MG dose pack  Commonly known as: MEDROL     omeprazole 20 MG capsule  Commonly known as: priLOSEC  Replaced by: pantoprazole 40 MG EC tablet            ASK your doctor about these medications        Instructions Start Date   amoxicillin-clavulanate 875-125 MG per tablet  Commonly known as: AUGMENTIN  Ask about: Should I take this medication?   1 tablet, Oral, 2 Times Daily               No Known Allergies      Discharge Disposition:  Home or Self Care    Diet:  Hospital:  Diet Order   Procedures    Diet: Cardiac; Healthy Heart (2-3 Na+); Fluid Consistency: Thin (IDDSI 0)            Activity:      Restrictions or Other Recommendations:         CODE STATUS:    Code Status and Medical Interventions: CPR (Attempt to Resuscitate); Full Support   Ordered at: 11/08/24 1219     Level Of Support Discussed With:    Patient     Code Status (Patient has no pulse and is not breathing):    CPR (Attempt to Resuscitate)     Medical Interventions (Patient has pulse or is breathing):    Full Support       Future Appointments   Date Time Provider Department Center   1/7/2025  8:45 AM Daron Mccarthy MD [facility]       Additional Instructions for the Follow-ups that You Need to Schedule       Ambulatory Referral to Sleep Medicine   As directed      Follow-up needed: Yes        Discharge Follow-up with PCP   As directed       Currently Documented  PCP:    Hilda Beckett PA    PCP Phone Number:    [phone]     Follow Up Details: 1 week hospital follow up                      Sarah Bernardo II,   11/12/24      Time Spent on Discharge:  I spent  33  minutes on this discharge activity which included: face-to-face encounter with the patient, reviewing the data in the system, coordination of the care with the nursing staff as well as consultants, documentation, and entering orders.

## 2024-11-12 NOTE — PLAN OF CARE
Goal Outcome Evaluation:  Plan of Care Reviewed With: patient        Progress: improving  Outcome Evaluation: A&Ox4. RA. SR->SB on tele. PRN cough syrup given. IV site changed per pt request. Pt slept between care.

## 2024-11-12 NOTE — DISCHARGE INSTR - OTHER ORDERS
A referral for speech therapy has been faxed to [facility] @ [phone]. The office address is [address] [place], [place] [address], [facility]. Dany from their office will call to schedule your appointment with Brissa Hill.

## 2024-11-12 NOTE — OUTREACH NOTE
Prep Survey      Flowsheet Row Responses   [facility] patient discharged from? [facility]   Is LACE score < 7 ? Yes   Eligibility [facility]   Date of Admission 11/08/24   Date of Discharge 11/12/24   Discharge Disposition Home or Self Care   Discharge diagnosis Syncope   Does the patient have one of the following disease processes/diagnoses(primary or secondary)? Other   Does the patient have Home health ordered? No   Is there a DME ordered? No   Comments regarding appointments Ambulatory Referral to Gastroenterology,  Ambulatory Referral to Sleep Medicine,  Ambulatory Referral to Speech Therapy for Evaluation & Treatment   Medication alerts for this patient see AVS   Prep survey completed? Yes            Tootie FENTON - Registered Nurse

## 2024-11-13 ENCOUNTER — TRANSITIONAL CARE MANAGEMENT TELEPHONE ENCOUNTER (OUTPATIENT)
Dept: CALL CENTER | Facility: HOSPITAL | Age: 49
End: 2024-11-13
Payer: COMMERCIAL

## 2024-11-13 LAB — VIT B1 BLD-SCNC: 135.7 NMOL/L (ref 66.5–200)

## 2024-11-13 NOTE — OUTREACH NOTE
Call Center TCM Note      Flowsheet Row Responses   [facility] patient discharged from? [place]   Does the patient have one of the following disease processes/diagnoses(primary or secondary)? Other   TCM attempt successful? Yes   Call start time 0907   Call end time 0908   Discharge diagnosis Syncope   Person spoke with today (if not patient) and relationship wife   Meds reviewed with patient/caregiver? Yes   Is the patient having any side effects they believe may be caused by any medication additions or changes? No   Does the patient have all medications ordered at discharge? Yes   Is the patient taking all medications as directed (includes completed medication regime)? Yes   Comments HOSP DC FU appt 11/19/24 12 pm.   Does the patient have an appointment with their PCP within 7-14 days of discharge? Yes   Has home health visited the patient within 72 hours of discharge? N/A   Psychosocial issues? No   Did the patient receive a copy of their discharge instructions? Yes   Nursing interventions Reviewed instructions with patient   What is the patient's perception of their health status since discharge? Same   Is the patient/caregiver able to teach back signs and symptoms related to disease process for when to call PCP? Yes   Is the patient/caregiver able to teach back signs and symptoms related to disease process for when to call 911? Yes   Is the patient/caregiver able to teach back the hierarchy of who to call/visit for symptoms/problems? PCP, Specialist, Home health nurse, Urgent Care, ED, 911 Yes   TCM call completed? Yes   Wrap up additional comments Wife reports Pt is doing ok. about the same. States he is wearing holter monitor and taking meds as ordered.   Call end time 0908            Katheryn Ferreira RN    11/13/2024, 09:09 EST

## 2024-11-13 NOTE — DISCHARGE PLACEMENT REQUEST
Hussain Feliz (49 y.o. Male)    To [facility] Speech Therapy  From Jo Castillo RN Case Mgr [phone]  [phone]         [facility] [address]  [address]  [facility] [address]  Phone:  [phone]  Fax:  [phone] Date: 2024      Ambulatory Referral to Speech Therapy for Evaluation & Treatment     Patient:  Hussain Feliz MRN:  [id number]   [address]  [facility] [address] :  1975  SSN:    Phone: [phone] Sex:  M      INSURANCE PAYOR PLAN GROUP # SUBSCRIBER ID   Primary:    CIRILO JAY [id number]      Referring Provider Information:  SARAH BERNARDO II Phone: [phone] Fax: [phone]       Referral Information:   # Visits:  1 Referral Type: Speech Pathology [AF1]   Urgency:  Routine Referral Reason: Specialty Services Required   Start Date: 2024 End Date:  To be determined by Insurer   Diagnosis: Syncope and collapse (R55 [ICD-10-CM] 780.2 [ICD-9-CM])  Vocal cord dysfunction (J38.3 [ICD-10-CM] 478.5 [ICD-9-CM])  GREGOR (obstructive sleep apnea) (G47.33 [ICD-10-CM] 327.23 [ICD-9-CM])  Gastroesophageal reflux disease, unspecified whether esophagitis present (K21.9 [ICD-10-CM] 530.81 [ICD-9-CM])      Refer to Dept:   Refer to Provider:   Refer to Provider Phone:   Refer to Facility:       Follow-up needed: Yes     This document serves as a request of services and does not constitute Insurance authorization or approval of services.  To determine eligibility, please contact the members Insurance carrier to verify and review coverage.     If you have medical questions regarding this request for services. Please contact [address] at [phone] during normal business hours.        Verbal Order Mode: Telephone with readback   Authorizing Provider: Sarah Bernardo II, DO  Authorizing Provider's NPI: [id number]     Order Entered By: Jo Castillo RN 2024 12:31 PM     Electronically  "signed by: Sarah Bernardo II, DO 11/12/2024  4:16 PM        Date of Birth   1975    Social Security Number       Address   [address] [place] [address]    Home Phone   [phone]    MRN   [id number]       Catholic   None    Marital Status                               Admission Date   11/8/24    Admission Type   Emergency    Admitting Provider   Sarah Bernardo II, DO    Attending Provider       Department, Room/Bed   [facility] 6[facility], N629/1       Discharge Date   11/12/2024    Discharge Disposition   Home or Self Care    Discharge Destination                                 Attending Provider: (none)   Allergies: No Known Allergies    Isolation: None   Infection: None   Code Status: Prior    Ht: 180.3 cm (71\")   Wt: 112 kg (247 lb 1.6 oz)    Admission Cmt: None   Principal Problem: Syncope [R55]                   Active Insurance as of 11/8/2024       Primary Coverage       Payor Plan Insurance Group Employer/Plan Group    [employer] [facility] [id number]       Payor Plan Address Payor Plan Phone Number Payor Plan Fax Number Effective Dates    [address] [phone]  1/1/2017 - None Entered    [place] [address]         Subscriber Name Subscriber Birth Date Member ID       KAMILA FELIZ 11/29/1978 [id number]                     Emergency Contacts        (Rel.) Home Phone Work Phone Mobile Phone    Kamila Feliz (Spouse) [phone] -- [phone]              Insurance Information                  [employer] EMPLOYEE Phone: [phone]    Subscriber: Kamila Feliz Subscriber#: [id number]    Group#: [id number] Precert#: --    Authorization#: -- Effective Date: --             Discharge Summary        Nat Singletary PA-C at 11/08/24 1809       Attestation signed by Shay Allen MD at 11/09/24 0709    I reviewed the case and agree with the plan                [facility] " CDU  TRANsFER SUMMARY      Patient Name: Hussain Feliz  : 1975  MRN: [id number]    Date of Admission: 2024  Date of Discharge:  24   Primary Care Physician: Hilda Beckett PA    Presenting Problem:   Syncope [R55]    Active and Resolved Hospital Problems:  Active Hospital Problems    Diagnosis POA    **Syncope [R55] Yes    Transient amnesia [R41.3] Yes      Resolved Hospital Problems   No resolved problems to display.     Hospital Course: A 49-year-old male presented to our CDU for further evaluation after a syncopal episode that occurred this morning.  Imaging including MRI of the brain, CTA head and neck, and CT of the head without were found to be unremarkable.  History from both the patient and his spouse are concerning for possible seizures.  We will therefore send the patient to [facility] for further evaluation by neurology and cardiology.    Nurses Notes reviewed and agree, including vitals, allergies, social history and prior medical history.     REVIEW OF SYSTEMS: All systems reviewed and not pertinent unless noted.  Review of Systems   Constitutional:  Negative for chills and fever.   HENT:  Negative for ear pain and sore throat.    Respiratory:  Positive for cough and shortness of breath.    Cardiovascular:  Negative for chest pain.   Gastrointestinal:  Negative for abdominal pain, diarrhea, nausea and vomiting.   Genitourinary:  Negative for dysuria and frequency.   Neurological:  Positive for syncope and headaches. Negative for dizziness.   All other systems reviewed and are negative.    Past Medical History:   Diagnosis Date    Heart burn      Allergies:    Patient has no known allergies.      History reviewed. No pertinent surgical history.    Social History     Socioeconomic History    Marital status:    Tobacco Use    Smoking status: Some Days     Current packs/day: 1.00     Types: Cigarettes    Smokeless tobacco: Never    Tobacco comments:     1 pack  "per week   Vaping Use    Vaping status: Never Used   Substance and Sexual Activity    Alcohol use: Yes     Comment: maybe one drink week    Drug use: Never    Sexual activity: Defer     Family History   Problem Relation Age of Onset    Hypertension Mother     Obesity Mother     Arthritis Mother      Objective  Physical Exam:  /78 (BP Location: Left arm, Patient Position: Lying)   Pulse 71   Temp 98.4 °F (36.9 °C) (Oral)   Resp 22   Ht 180.3 cm (70.98\")   Wt 111 kg (244 lb 11.4 oz)   SpO2 94%   BMI 34.15 kg/m²      Physical Exam  Vitals and nursing note reviewed.   Constitutional:       Appearance: Normal appearance.   HENT:      Head: Normocephalic and atraumatic.   Eyes:      Extraocular Movements: Extraocular movements intact.      Pupils: Pupils are equal, round, and reactive to light.   Cardiovascular:      Rate and Rhythm: Normal rate and regular rhythm.      Pulses: Normal pulses.   Pulmonary:      Effort: Pulmonary effort is normal.      Breath sounds: Wheezing present.   Abdominal:      General: Abdomen is flat. Bowel sounds are normal.      Palpations: Abdomen is soft.   Musculoskeletal:         General: Normal range of motion.   Skin:     General: Skin is warm and dry.   Neurological:      General: No focal deficit present.      Mental Status: He is alert and oriented to person, place, and time.   Psychiatric:         Mood and Affect: Mood normal.         Behavior: Behavior normal.       Procedures:none    MRI Brain Without Contrast    Result Date: 11/8/2024  MRI BRAIN WO CONTRAST Date of Exam: 11/8/2024 3:13 PM EST Indication: syncope.  Comparison: CT same day Technique:  Routine multiplanar/multisequence sequence images of the brain were obtained without contrast administration. Findings: Diffusion-weighted imaging demonstrates no acute restriction abnormality. Midline structures of the brain are grossly unremarkable in appearance. Pituitary and sella structures and craniocervical junction " are grossly unremarkable in their appearance. The ventricles, cisterns and sulci appear within normal limits. No acute intracranial hemorrhage or mass effect is noted. Gray and white matter signal characteristics appear within normal limits. Major intracranial flow voids appear grossly patent. Please see CT angiogram same day for further evaluation. Mild mucosal thickening of the paranasal sinuses noted. Globes and orbits appear grossly unremarkable in their appearance. Mastoid air cells appear to be grossly clear. Cerebral pontine angle structures and inner ear structures appear grossly unremarkable in appearance.     Impression: Impression: No acute intracranial abnormality Electronically Signed: Cristian Richards MD  11/8/2024 3:51 PM EST  Workstation ID: [id number]    Treadmill Stress Test    Result Date: 11/8/2024    Pt denied chest pain/discomfort during exercise. Pt did c/o SOA (SP02 WNL on RA) that resolved in recovery.   THR of 146 achieved at 8:40.   Expected exercise duration = 10:40. Actual exercise duration = 8:50. Pt stopped d/t leg fatigue.   DTS of 15.8.   SR-ST with rare PVC in recovery.   No ST-T wave changes noted.   No ECG evidence of myocardial ischemia.   Negative clinical evidence of myocardial ischemia.   Findings consistent with a normal ECG stress test.     CT Angiogram Neck    Result Date: 11/8/2024  CT ANGIOGRAM NECK, CT ANGIOGRAM HEAD Date of Exam: 11/8/2024 12:54 PM EST Indication: syncope. Comparison: CT head from earlier today Technique: CTA of the head and neck was performed before and after the uneventful intravenous administration of 75 mL Isovue-370. Reconstructed coronal and sagittal images were also obtained. In addition, a 3-D volume rendered image was created for interpretation. Automated exposure control and iterative reconstruction methods were used. Findings: There is a three-vessel aortic arch. The right common carotid artery is widely patent with mild plaque distally. The  right carotid bifurcation is widely patent. The right internal carotid artery is widely patent. The left common carotid artery is widely patent. The left carotid bifurcation is widely patent. The left internal carotid artery is widely patent. The right vertebral artery is widely patent. The left vertebral artery is widely patent. The left vertebral artery is dominant. The bilateral middle cerebral, bilateral anterior cerebral, and anterior communicating arteries are widely patent. The basilar and bilateral posterior cerebral arteries are widely patent. There are patent bilateral posterior communicating arteries. No intracranial aneurysm is identified. The visualized portions of the bilateral superior cerebellar, anteroinferior cerebellar, and posterior inferior cerebellar arteries are unremarkable.     Impression: Impression: Major arterial vasculature within head and neck appears widely patent, with no hemodynamically significant stenosis, dissection, thrombus, or aneurysm. Electronically Signed: Luis Dennis MD  11/8/2024 1:29 PM EST  Workstation ID: [id number]    CT Angiogram Head    Result Date: 11/8/2024  CT ANGIOGRAM NECK, CT ANGIOGRAM HEAD Date of Exam: 11/8/2024 12:54 PM EST Indication: syncope. Comparison: CT head from earlier today Technique: CTA of the head and neck was performed before and after the uneventful intravenous administration of 75 mL Isovue-370. Reconstructed coronal and sagittal images were also obtained. In addition, a 3-D volume rendered image was created for interpretation. Automated exposure control and iterative reconstruction methods were used. Findings: There is a three-vessel aortic arch. The right common carotid artery is widely patent with mild plaque distally. The right carotid bifurcation is widely patent. The right internal carotid artery is widely patent. The left common carotid artery is widely patent. The left carotid bifurcation is widely patent. The left internal carotid  artery is widely patent. The right vertebral artery is widely patent. The left vertebral artery is widely patent. The left vertebral artery is dominant. The bilateral middle cerebral, bilateral anterior cerebral, and anterior communicating arteries are widely patent. The basilar and bilateral posterior cerebral arteries are widely patent. There are patent bilateral posterior communicating arteries. No intracranial aneurysm is identified. The visualized portions of the bilateral superior cerebellar, anteroinferior cerebellar, and posterior inferior cerebellar arteries are unremarkable.     Impression: Impression: Major arterial vasculature within head and neck appears widely patent, with no hemodynamically significant stenosis, dissection, thrombus, or aneurysm. Electronically Signed: Luis Dennis MD  11/8/2024 1:29 PM EST  Workstation ID: [id number]    CT Head Without Contrast    Result Date: 11/8/2024  CT HEAD WO CONTRAST Date of Exam: 11/8/2024 12:54 PM EST Indication: syncope. Comparison: None available. Technique: Axial CT images were obtained of the head without contrast administration.  Automated exposure control and iterative construction methods were used. Findings: No acute intracranial hemorrhage or extra-axial collection is identified. The ventricles appear normal in caliber, with no evidence of mass effect or midline shift. The basal cisterns appear patent. The gray-white differentiation appears preserved. The calvarium appear intact. The paranasal sinuses are clear. The mastoid air cells are well-aerated.     Impression: Impression: No acute intracranial process identified. Electronically Signed: Luis Dennis MD  11/8/2024 1:18 PM EST  Workstation ID: [id number]    CT Angiogram Chest    Result Date: 11/8/2024  CT ANGIOGRAM CHEST Date of Exam: 11/8/2024 11:15 AM EST Indication: syncope, concern for PE. Comparison: Chest x-ray from today Technique: CTA of the chest was performed after the uneventful  intravenous administration of 85 mL Isovue-370. Reconstructed coronal and sagittal images were also obtained. In addition, a 3-D volume rendered image was created for interpretation. Automated exposure control and iterative reconstruction methods were used. FINDINGS: Thoracic inlet: Unremarkable. Pulmonary arteries: No filling defects are identified within the pulmonary arteries to suggest acute pulmonary embolism. Great vessels: The thoracic aorta and proximal arch vessels appear unremarkable. Mediastinum/Katie: No pathologically enlarged mediastinal lymph nodes are seen. The esophagus is unremarkable. Lung parenchyma: Left upper lobe granuloma. Lungs appear otherwise adequately aerated. No acute infiltrate is seen. No suspicious pulmonary nodules are seen. Trachea and airways: The trachea and central airways appear unremarkable. Pleural space: No significant pleural effusion or pneumothorax. Heart and pericardium: The heart and pericardium appear unremarkable. Chest wall: No acute or suspicious osseous or soft tissue lesion is identified. Upper abdomen: No acute abnormality is identified within the visualized upper abdomen. There are several subcentimeter liver hypodensities which are too small to characterize.     Impression: 1.No acute abnormality is identified within the thorax. Specifically, there is no evidence of acute pulmonary embolism. 2.Please see above for additional details. Electronically Signed: Cas Orozco MD  11/8/2024 11:35 AM EST  Workstation ID: [id number]    XR Chest 1 View    Result Date: 11/8/2024  XR CHEST 1 VW Date of Exam: 11/8/2024 10:54 AM EST Indication: SOA triage protocol Comparison: 11/4/2024 Findings: Low lung volumes. Heart size and pulmonary vasculature are within normal limits. Mild atelectasis and vascular crowding in the lung bases due to low lung volumes. No suspicious infiltrate or edema. Calcified granuloma in the peripheral left midlung. Costophrenic angle sharp      Impression: Impression: Hypoinspiratory film demonstrating no acute cardiopulmonary process Electronically Signed: Ranjith Garcia  11/8/2024 11:08 AM EST  Workstation ID: [id number]          Lab 11/08/24  1034   LACTATE 1.0       Site   Date Value Ref Range Status   11/08/2024 Right Radial  Final     Grady's Test   Date Value Ref Range Status   11/08/2024 N/A  Final     pH, Arterial   Date Value Ref Range Status   11/08/2024 7.408 7.350 - 7.450 pH units Final     pCO2, Arterial   Date Value Ref Range Status   11/08/2024 40.1 35.0 - 45.0 mm Hg Final     pO2, Arterial   Date Value Ref Range Status   11/08/2024 62.9 (L) 83.0 - 108.0 mm Hg Final     Comment:     84 Value below reference range     HCO3, Arterial   Date Value Ref Range Status   11/08/2024 25.3 20.0 - 26.0 mmol/L Final     Base Excess, Arterial   Date Value Ref Range Status   11/08/2024 0.6 0.0 - 2.0 mmol/L Final     Hemoglobin, Blood Gas   Date Value Ref Range Status   11/08/2024 18.0 14 - 18 g/dL Final     Comment:     83 Value above reference range     Hematocrit, Blood Gas   Date Value Ref Range Status   11/08/2024 55.2 % Final     Oxyhemoglobin   Date Value Ref Range Status   11/08/2024 91.5 (L) 94 - 99 % Final     Comment:     84 Value below reference range     Methemoglobin   Date Value Ref Range Status   11/08/2024 0.00 0.00 - 1.50 % Final     Carboxyhemoglobin   Date Value Ref Range Status   11/08/2024 0.9 0 - 2 % Final     CO2 Content   Date Value Ref Range Status   11/08/2024 26.5 22 - 33 mmol/L Final     Barometric Pressure for Blood Gas   Date Value Ref Range Status   11/08/2024   Final     Comment:     N/A     Modality   Date Value Ref Range Status   11/08/2024 Room Air  Final     FIO2   Date Value Ref Range Status   11/08/2024 21 % Final       Results from last 7 days   Lab Units 11/08/24  1436 11/08/24  1034 11/04/24  1017   HSTROP T ng/L 8 10 <6       Results from last 7 days   Lab Units 11/08/24  1034 11/04/24  1017   WBC 10*3/mm3 7.04  7.65   HEMOGLOBIN g/dL 18.0* 16.3   HEMATOCRIT % 51.5* 46.8   PLATELETS 10*3/mm3 253 211       Results from last 7 days   Lab Units 11/08/24  1034 11/04/24  1017   SODIUM mmol/L 143 142   POTASSIUM mmol/L 4.2 3.8   CHLORIDE mmol/L 106 109*   CO2 mmol/L 30.3* 26.4   BUN mg/dL 13 13   CREATININE mg/dL 1.12 0.97   CALCIUM mg/dL 8.9 8.3*   BILIRUBIN mg/dL 0.8 0.6   ALK PHOS U/L 79 72   ALT (SGPT) U/L 41 29   AST (SGOT) U/L 32 30   GLUCOSE mg/dL 103* 97         Diagnosis: Syncope, Transient Amnesia    MDM: Patient will be transferred to [facility] for further treatment evaluation.  I did discuss this case with Dr. Kerley (Hospitalist) he agreed further workup for seizures is warranted.  He states he will do a continuous EEG.  Patient stable at the time of transfer.  No seizure-like activity here in the CDU.    Medication List:      Your medication list        CONTINUE taking these medications        Instructions Last Dose Given Next Dose Due   albuterol sulfate  (90 Base) MCG/ACT inhaler  Commonly known as: PROVENTIL HFA;VENTOLIN HFA;PROAIR HFA      Inhale 2 puffs Every 4 (Four) Hours As Needed for Wheezing.       amoxicillin-clavulanate 875-125 MG per tablet  Commonly known as: AUGMENTIN      Take 1 tablet by mouth 2 (Two) Times a Day for 5 days.       azithromycin 250 MG tablet  Commonly known as: Zithromax Z-Chalo      Take 2 tablets by mouth on day 1, then 1 tablet daily on days 2-5       methylPREDNISolone 4 MG dose pack  Commonly known as: MEDROL      Take as directed on package instructions.               Nat Singletary PA-C   11/08/24   18:15 EST     Time Spent on Discharge:  I spent  30  minutes on this discharge activity which included: face-to-face encounter with the patient, reviewing the data in the system, coordination of the care with the nursing staff as well as consultants, documentation, and entering orders.     Electronically signed by Shay Allen MD at 11/09/24 0709        Sarah Bernardo II, DO at 24 0725              [facility] Medicine Services  DISCHARGE SUMMARY    Patient Name: Hussain Feliz  : 1975  MRN: [id number]    Date of Admission: 2024 10:29 AM  Date of Discharge:  2024  Primary Care Physician: Hilda Beckett PA    Consults       Date and Time Order Name Status Description    2024  1:45 PM Inpatient Pulmonology Consult Completed     2024  7:20 AM Inpatient ENT Consult      2024 11:28 PM Inpatient Neurology Consult General Completed     2024 11:28 PM Inpatient Cardiology Consult Completed             Hospital Course     Presenting Problem: syncope    Active Hospital Problems    Diagnosis  POA    GERD (gastroesophageal reflux disease) [K21.9]  Yes    Vocal cord dysfunction [J38.3]  Yes    GREGOR (obstructive sleep apnea) [G47.33]  Yes    Cigarette nicotine dependence without complication [F17.210]  Yes      Resolved Hospital Problems    Diagnosis Date Resolved POA    **Syncope [R55] 2024 Yes    Transient amnesia [R41.3] 2024 Yes          Hospital Course:  Hussain Feliz is a 49 y.o. male with no significant PMHx presents with episodes of syncope and disorientation in the setting of a viral infection/bronchitis being treated with abx and steroids     Vocal cord dysfunction w/ syncope  Chronic cough  Tobacco use  --Patient underwent extensive evaluation upon arrival with CTA chest, stress test, echocardiogram, telemetry monitoring, EEG - all negative for etiology of symptoms.   --ENT and pulmonary were then consulted - flexible laryngoscopy was performed showing mild erythema/edema and moderate collapse/spasm of laryngeal walls. Symptoms ultimately felt to be due to vocal cord dysfunction. Treatment of underlying etiology was recommended.  --Add PPI at d/c and discussed nonmedical treatments of GERD with patient/family. Refer to [facility] as outpt for surveillance endoscopies.  --Home  with decongestant, cough suppression. No driving while still having these events.  --Holter monitor to be applied prior to d/c.    Discharge Follow Up Recommendations for outpatient labs/diagnostics:   PCP 1 week   Referral to [facility] and [facility] at d/c.    Day of Discharge     HPI: Up in bed with wife in room. Had 1 episode yesterday am but none since and none this far today. Feels like cough is improving. Asking to go home.    Review of Systems  Gen- No fevers, chills  CV- No chest pain, palpitations  Resp- No cough, dyspnea  GI- No N/V/D, abd pain     Vital Signs:   Temp:  [97.8 °F (36.6 °C)-98.7 °F (37.1 °C)] 97.9 °F (36.6 °C)  Heart Rate:  [53-79] 68  Resp:  [16-18] 16  BP: (110-146)/(64-93) 117/84      Physical Exam:  Constitutional: No acute distress, awake, alert  HENT: NCAT, mucous membranes moist  Respiratory: Clear to auscultation bilaterally, respiratory effort normal   Cardiovascular: RRR, no murmurs, rubs, or gallops  Gastrointestinal: Positive bowel sounds, soft, nontender, nondistended  Musculoskeletal: No bilateral ankle edema  Psychiatric: Appropriate affect, cooperative  Neurologic: Oriented x 3, strength symmetric in all extremities, Cranial Nerves grossly intact to confrontation, speech clear  Skin: No rashes     Pertinent  and/or Most Recent Results     LAB RESULTS:      Lab 11/09/24  0505 11/08/24  1034   WBC 7.25 7.04   HEMOGLOBIN 16.7 18.0*   HEMATOCRIT 47.4 51.5*   PLATELETS 232 253   NEUTROS ABS  --  3.54   IMMATURE GRANS (ABS)  --  0.04   LYMPHS ABS  --  2.54   MONOS ABS  --  0.53   EOS ABS  --  0.35   MCV 92.0 91.5   CRP  --  <0.30   LACTATE  --  1.0         Lab 11/09/24  0505 11/08/24  1034   SODIUM 143 143   POTASSIUM 3.8 4.2   CHLORIDE 110* 106   CO2 22.0 30.3*   ANION GAP 11.0 6.7   BUN 13 13   CREATININE 0.97 1.12   EGFR 96.3 81.0   GLUCOSE 106* 103*   CALCIUM 8.3* 8.9   MAGNESIUM  --  2.2         Lab 11/09/24  0505 11/08/24  1034   TOTAL PROTEIN 6.2 6.8   ALBUMIN 3.8  4.3   GLOBULIN 2.4 2.5   ALT (SGPT) 38 41   AST (SGOT) 26 32   BILIRUBIN 0.7 0.8   ALK PHOS 75 79         Lab 11/09/24  0505 11/08/24  1803 11/08/24  1436 11/08/24  1034   PROBNP  --   --   --  <36.0   HSTROP T 10 9 8 10             Lab 11/09/24  0505   VITAMIN B 12 582         Lab 11/08/24  1519   PH, ARTERIAL 7.408   PCO2, ARTERIAL 40.1   PO2 ART 62.9*   FIO2 21   HCO3 ART 25.3   BASE EXCESS ART 0.6   CARBOXYHEMOGLOBIN 0.9     Brief Urine Lab Results  (Last result in the past 365 days)        Color   Clarity   Blood   Leuk Est   Nitrite   Protein   CREAT   Urine HCG        11/08/24 1555 Yellow   Clear   Negative   Negative   Negative   Negative                 Microbiology Results (last 10 days)       Procedure Component Value - Date/Time    Respiratory Panel PCR w/COVID-19(SARS-CoV-2) [facility] In-House, NP Swab in UTM/VTM, 2 HR TAT - Swab, Nasopharynx [[id number]]  (Normal) Collected: 11/08/24 1436    Lab Status: Final result Specimen: Swab from Nasopharynx Updated: 11/08/24 1532     ADENOVIRUS, PCR Not Detected     Coronavirus 229E Not Detected     Coronavirus HKU1 Not Detected     Coronavirus NL63 Not Detected     Coronavirus OC43 Not Detected     COVID19 Not Detected     Human Metapneumovirus Not Detected     Human Rhinovirus/Enterovirus Not Detected     Influenza A PCR Not Detected     Influenza B PCR Not Detected     Parainfluenza Virus 1 Not Detected     Parainfluenza Virus 2 Not Detected     Parainfluenza Virus 3 Not Detected     Parainfluenza Virus 4 Not Detected     RSV, PCR Not Detected     Bordetella pertussis pcr Not Detected     Bordetella parapertussis PCR Not Detected     Chlamydophila pneumoniae PCR Not Detected     Mycoplasma pneumo by PCR Not Detected    Narrative:      In the setting of a positive respiratory panel with a viral infection PLUS a negative procalcitonin without other underlying concern for bacterial infection, consider observing off antibiotics or discontinuation of  antibiotics and continue supportive care. If the respiratory panel is positive for atypical bacterial infection (Bordetella pertussis, Chlamydophila pneumoniae, or Mycoplasma pneumoniae), consider antibiotic de-escalation to target atypical bacterial infection.    Respiratory Panel PCR w/COVID-19(SARS-CoV-2) [facility] In-House, NP Swab in UTM/VTM, 2 HR TAT - Swab, Nasopharynx [[id number]]  (Normal) Collected: 11/04/24 1052    Lab Status: Final result Specimen: Swab from Nasopharynx Updated: 11/04/24 1145     ADENOVIRUS, PCR Not Detected     Coronavirus 229E Not Detected     Coronavirus HKU1 Not Detected     Coronavirus NL63 Not Detected     Coronavirus OC43 Not Detected     COVID19 Not Detected     Human Metapneumovirus Not Detected     Human Rhinovirus/Enterovirus Not Detected     Influenza A PCR Not Detected     Influenza B PCR Not Detected     Parainfluenza Virus 1 Not Detected     Parainfluenza Virus 2 Not Detected     Parainfluenza Virus 3 Not Detected     Parainfluenza Virus 4 Not Detected     RSV, PCR Not Detected     Bordetella pertussis pcr Not Detected     Bordetella parapertussis PCR Not Detected     Chlamydophila pneumoniae PCR Not Detected     Mycoplasma pneumo by PCR Not Detected    Narrative:      In the setting of a positive respiratory panel with a viral infection PLUS a negative procalcitonin without other underlying concern for bacterial infection, consider observing off antibiotics or discontinuation of antibiotics and continue supportive care. If the respiratory panel is positive for atypical bacterial infection (Bordetella pertussis, Chlamydophila pneumoniae, or Mycoplasma pneumoniae), consider antibiotic de-escalation to target atypical bacterial infection.            EEG Continuous Monitoring With Video    Result Date: 11/11/2024  Date of Procedure: 11-10 at 11 AM through 11-11 at 7:45 AM Reason for referral: 49 y.o.male with recurrent episodes of loss of consciousness,  consideration of seizures, seizure classification Technical summary: A 19 channel digital EEG is performed using the international 10-20 placement system, including eye leads and EKG leads.  Video is available, and split screen technology with video/EEG correlation is used as needed.   A patient event button is offered. Review of the relevant video-EEG data was performed throughout the day.  Detailed review of the EEG and relevant video was performed using multiple montages, including a variety of referential and bipolar montages.  Results of the monitoring related to the treatment team frequently during the study, at least once a day.  Where available, seizure detection software was used for the detection of ictal and interictal discharges. Findings: At the beginning of the study, the patient is awake and resting comfortably in bed.  Diffuse low amplitude alpha activity is present symmetrically over both hemispheres.  EMG and eye blink artifact are noted anteriorly.  At times a well-regulated 9 Hz posterior rhythm is evident symmetrically over the occipital leads.  Shortly after study onset, around 11:30 in the morning, the patient has an episode of coughing.  He is observed to be sitting up in bed and there is movement artifact on the EEG.  He then by report has a brief episode of loss of consciousness or altered mental status.  During this time, no changes in the EEG are seen.  No rhythmic or periodic discharges are noted.  No focal slowing is appreciated and no epileptiform activity is seen.  Over the course of the day, although the patient has additional coughing spells, there are no further associated episodes of altered mental status.  Overnight, sleep is seen with slowing of the background and vertex waves.  Around 3 AM, significant electrode artifact becomes apparent and the study becomes temporarily unreadable.  The head is rewrapped around 5:45 in the morning and the study quality is then again excellent.   No additional episodes are reported through the end of the study.       EEG background is normal in the awake and asleep states No focal features or epileptiform activity are seen A single episode of coughing followed by brief loss of consciousness has no EEG correlate No electrographic seizures are seen Summary: Over the entirety of this recording, no evidence for epilepsy is seen Findings communicated to the consultant neurologist This report is transcribed using the Dragon dictation system.     EEG Continuous Monitoring With Video    Result Date: 11/10/2024  Date of Procedure: 11-9 at 6:30 PM through 11-10 at 11 AM Reason for referral: 49 y.o.male with recurrent episodes of syncope, altered mental status, consideration of seizures Technical summary: A 19 channel digital EEG is performed using the international 10-20 placement system, including eye leads and EKG leads.  Video is available, and split screen technology with video/EEG correlation is used as needed.   A patient event button is offered. Review of the relevant video-EEG data was performed throughout the day.  Detailed review of the EEG and relevant video was performed using multiple montages, including a variety of referential and bipolar montages.  Results of the monitoring related to the treatment team frequently during the study, at least once a day.  Where available, seizure detection software was used for the detection of ictal and interictal discharges. Findings: The patient is awake at the beginning of the study.  The background shows diffuse low amplitude theta and alpha activity present symmetrically over both hemispheres.  At times a 10 Hz posterior rhythm is evident over the occipital leads.  EMG and eye blink artifact are present anteriorly.  Overnight, sleep is seen with slowing of the background, and sleep spindles.  In the early morning hours, electrode artifact is present in the occipital leads and this becomes quite prominent in the  last hour of the recording.  The patient's head is rewrapped shortly before 11 AM, and the study is again of good technical quality.  Over the course of the study.  No focal features or epileptiform activity are seen.  The patient event button is not pushed.  No clinical or electrographic seizures are seen.       Normal EEG in the awake and asleep states No focal features or epileptiform activity are seen No clinical or electrographic seizures are captured This report is transcribed using the Dragon dictation system.     EEG    Result Date: 11/9/2024  Reason for referral: 49 y.o.male recurrent syncope, consideration of seizures Technical Summary:  A 19 channel digital EEG was performed using the international 10-20 placement system, including eye leads and EKG leads. Duration: 20 minutes Findings: The patient is awake.  Diffuse low amplitude intermixed theta and alpha activity is present symmetrically over both hemispheres.  At times the tenderness posterior rhythm is evident.  Photic stimulation is a symmetric driving response.  Hyperventilation is not performed.  Stage II sleep is not seen.  No focal features or epileptiform activity are noted Video: Available Technical quality: Good EKG: Regular, 60 bpm SUMMARY: Normal EEG in the awake state No focal features or epileptiform activity are seen     Normal study This report is transcribed using the Dragon dictation system.      MRI Brain Without Contrast    Result Date: 11/8/2024  MRI BRAIN WO CONTRAST Date of Exam: 11/8/2024 3:13 PM EST Indication: syncope.  Comparison: CT same day Technique:  Routine multiplanar/multisequence sequence images of the brain were obtained without contrast administration. Findings: Diffusion-weighted imaging demonstrates no acute restriction abnormality. Midline structures of the brain are grossly unremarkable in appearance. Pituitary and sella structures and craniocervical junction are grossly unremarkable in their appearance. The  ventricles, cisterns and sulci appear within normal limits. No acute intracranial hemorrhage or mass effect is noted. Gray and white matter signal characteristics appear within normal limits. Major intracranial flow voids appear grossly patent. Please see CT angiogram same day for further evaluation. Mild mucosal thickening of the paranasal sinuses noted. Globes and orbits appear grossly unremarkable in their appearance. Mastoid air cells appear to be grossly clear. Cerebral pontine angle structures and inner ear structures appear grossly unremarkable in appearance.     Impression: No acute intracranial abnormality Electronically Signed: Cristian Richards MD  11/8/2024 3:51 PM EST  Workstation ID: [id number]    Treadmill Stress Test    Result Date: 11/8/2024    Pt denied chest pain/discomfort during exercise. Pt did c/o SOA (SP02 WNL on RA) that resolved in recovery.   THR of 146 achieved at 8:40.   Expected exercise duration = 10:40. Actual exercise duration = 8:50. Pt stopped d/t leg fatigue.   DTS of 15.8.   SR-ST with rare PVC in recovery.   No ST-T wave changes noted.   No ECG evidence of myocardial ischemia.   Negative clinical evidence of myocardial ischemia.   Findings consistent with a normal ECG stress test.     CT Angiogram Neck    Result Date: 11/8/2024  CT ANGIOGRAM NECK, CT ANGIOGRAM HEAD Date of Exam: 11/8/2024 12:54 PM EST Indication: syncope. Comparison: CT head from earlier today Technique: CTA of the head and neck was performed before and after the uneventful intravenous administration of 75 mL Isovue-370. Reconstructed coronal and sagittal images were also obtained. In addition, a 3-D volume rendered image was created for interpretation. Automated exposure control and iterative reconstruction methods were used. Findings: There is a three-vessel aortic arch. The right common carotid artery is widely patent with mild plaque distally. The right carotid bifurcation is widely patent. The right internal  carotid artery is widely patent. The left common carotid artery is widely patent. The left carotid bifurcation is widely patent. The left internal carotid artery is widely patent. The right vertebral artery is widely patent. The left vertebral artery is widely patent. The left vertebral artery is dominant. The bilateral middle cerebral, bilateral anterior cerebral, and anterior communicating arteries are widely patent. The basilar and bilateral posterior cerebral arteries are widely patent. There are patent bilateral posterior communicating arteries. No intracranial aneurysm is identified. The visualized portions of the bilateral superior cerebellar, anteroinferior cerebellar, and posterior inferior cerebellar arteries are unremarkable.     Impression: Major arterial vasculature within head and neck appears widely patent, with no hemodynamically significant stenosis, dissection, thrombus, or aneurysm. Electronically Signed: Luis Dennis MD  11/8/2024 1:29 PM EST  Workstation ID: [id number]    CT Angiogram Head    Result Date: 11/8/2024  CT ANGIOGRAM NECK, CT ANGIOGRAM HEAD Date of Exam: 11/8/2024 12:54 PM EST Indication: syncope. Comparison: CT head from earlier today Technique: CTA of the head and neck was performed before and after the uneventful intravenous administration of 75 mL Isovue-370. Reconstructed coronal and sagittal images were also obtained. In addition, a 3-D volume rendered image was created for interpretation. Automated exposure control and iterative reconstruction methods were used. Findings: There is a three-vessel aortic arch. The right common carotid artery is widely patent with mild plaque distally. The right carotid bifurcation is widely patent. The right internal carotid artery is widely patent. The left common carotid artery is widely patent. The left carotid bifurcation is widely patent. The left internal carotid artery is widely patent. The right vertebral artery is widely patent. The  left vertebral artery is widely patent. The left vertebral artery is dominant. The bilateral middle cerebral, bilateral anterior cerebral, and anterior communicating arteries are widely patent. The basilar and bilateral posterior cerebral arteries are widely patent. There are patent bilateral posterior communicating arteries. No intracranial aneurysm is identified. The visualized portions of the bilateral superior cerebellar, anteroinferior cerebellar, and posterior inferior cerebellar arteries are unremarkable.     Impression: Major arterial vasculature within head and neck appears widely patent, with no hemodynamically significant stenosis, dissection, thrombus, or aneurysm. Electronically Signed: Luis Dennis MD  11/8/2024 1:29 PM EST  Workstation ID: [id number]    CT Head Without Contrast    Result Date: 11/8/2024  CT HEAD WO CONTRAST Date of Exam: 11/8/2024 12:54 PM EST Indication: syncope. Comparison: None available. Technique: Axial CT images were obtained of the head without contrast administration.  Automated exposure control and iterative construction methods were used. Findings: No acute intracranial hemorrhage or extra-axial collection is identified. The ventricles appear normal in caliber, with no evidence of mass effect or midline shift. The basal cisterns appear patent. The gray-white differentiation appears preserved. The calvarium appear intact. The paranasal sinuses are clear. The mastoid air cells are well-aerated.     Impression: No acute intracranial process identified. Electronically Signed: Luis Dennis MD  11/8/2024 1:18 PM EST  Workstation ID: [id number]    CT Angiogram Chest    Result Date: 11/8/2024  CT ANGIOGRAM CHEST Date of Exam: 11/8/2024 11:15 AM EST Indication: syncope, concern for PE. Comparison: Chest x-ray from today Technique: CTA of the chest was performed after the uneventful intravenous administration of 85 mL Isovue-370. Reconstructed coronal and sagittal images were also  obtained. In addition, a 3-D volume rendered image was created for interpretation. Automated exposure control and iterative reconstruction methods were used. FINDINGS: Thoracic inlet: Unremarkable. Pulmonary arteries: No filling defects are identified within the pulmonary arteries to suggest acute pulmonary embolism. Great vessels: The thoracic aorta and proximal arch vessels appear unremarkable. Mediastinum/Katie: No pathologically enlarged mediastinal lymph nodes are seen. The esophagus is unremarkable. Lung parenchyma: Left upper lobe granuloma. Lungs appear otherwise adequately aerated. No acute infiltrate is seen. No suspicious pulmonary nodules are seen. Trachea and airways: The trachea and central airways appear unremarkable. Pleural space: No significant pleural effusion or pneumothorax. Heart and pericardium: The heart and pericardium appear unremarkable. Chest wall: No acute or suspicious osseous or soft tissue lesion is identified. Upper abdomen: No acute abnormality is identified within the visualized upper abdomen. There are several subcentimeter liver hypodensities which are too small to characterize.     1.No acute abnormality is identified within the thorax. Specifically, there is no evidence of acute pulmonary embolism. 2.Please see above for additional details. Electronically Signed: Cas Orozco MD  11/8/2024 11:35 AM EST  Workstation ID: [id number]    XR Chest 1 View    Result Date: 11/8/2024  XR CHEST 1 VW Date of Exam: 11/8/2024 10:54 AM EST Indication: SOA triage protocol Comparison: 11/4/2024 Findings: Low lung volumes. Heart size and pulmonary vasculature are within normal limits. Mild atelectasis and vascular crowding in the lung bases due to low lung volumes. No suspicious infiltrate or edema. Calcified granuloma in the peripheral left midlung. Costophrenic angle sharp     Impression: Hypoinspiratory film demonstrating no acute cardiopulmonary process Electronically Signed: Ranjith Garcia   11/8/2024 11:08 AM EST  Workstation ID: [id number]    XR Chest 2 View    Result Date: 11/4/2024  XR CHEST 2 VW Date of Exam: 11/4/2024 11:16 AM EST Indication: pna? Comparison: None available. Findings: Heart mediastinum and pulmonary vasculature appear within normal limits. There is generalized pattern of coarsened and indistinct pulmonary interstitial markings and peribronchial thickening, suggesting bronchial inflammation. These changes are little denser in the left midlung than elsewhere, but appear to be diffuse on both images. No lung consolidation, effusion or pneumothorax is appreciated. A few calcified granulomas are noted.     Impression: Peribronchial thickening which may be chronic, as from asthma, or acute as from bronchitis or early bronchopneumonia. Changes are little greater in the left perihilar region than elsewhere. These correlate with patient's symptoms. Electronically Signed: Jaret Farooq MD  11/4/2024 1:08 PM EST  Workstation ID: [id number]             Results for orders placed during the hospital encounter of 11/08/24    Adult Transthoracic Echo Complete w/ Color, Spectral and Contrast (If Necessary Per Protocol)    Interpretation Summary    Left ventricular systolic function is normal. Calculated left ventricular 3D EF = 63% Left ventricular ejection fraction appears to be 61 - 65%.    Trace mitral valve regurgitation is present.    Normal left atrial size and volume noted.      Plan for Follow-up of Pending Labs/Results:   Pending Labs       Order Current Status    Vitamin B1, Whole Blood In process    Vitamin B6 In process          Discharge Details        Discharge Medications        New Medications        Instructions Start Date   benzonatate 100 MG capsule  Commonly known as: TESSALON   200 mg, Oral, 3 Times Daily PRN      pantoprazole 40 MG EC tablet  Commonly known as: PROTONIX  Replaces: omeprazole 20 MG capsule   40 mg, Oral, Daily      phenol 1.4 % liquid liquid  Commonly known as:  CHLORASEPTIC   1 spray, Mouth/Throat, Every 2 Hours PRN      promethazine-codeine 6.25-10 MG/5ML syrup  Commonly known as: PHENERGAN with CODEINE   5 mL, Oral, Every 4 Hours PRN      pseudoephedrine 120 MG 12 hr tablet  Commonly known as: SUDAFED   120 mg, Oral, 2 Times Daily             Continue These Medications        Instructions Start Date   albuterol sulfate  (90 Base) MCG/ACT inhaler  Commonly known as: PROVENTIL HFA;VENTOLIN HFA;PROAIR HFA   2 puffs, Inhalation, Every 4 Hours PRN             Stop These Medications      azithromycin 250 MG tablet  Commonly known as: Zithromax Z-Chalo     methylPREDNISolone 4 MG dose pack  Commonly known as: MEDROL     omeprazole 20 MG capsule  Commonly known as: priLOSEC  Replaced by: pantoprazole 40 MG EC tablet            ASK your doctor about these medications        Instructions Start Date   amoxicillin-clavulanate 875-125 MG per tablet  Commonly known as: AUGMENTIN  Ask about: Should I take this medication?   1 tablet, Oral, 2 Times Daily               No Known Allergies      Discharge Disposition:  Home or Self Care    Diet:  Hospital:  Diet Order   Procedures    Diet: Cardiac; Healthy Heart (2-3 Na+); Fluid Consistency: Thin (IDDSI 0)            Activity:      Restrictions or Other Recommendations:         CODE STATUS:    Code Status and Medical Interventions: CPR (Attempt to Resuscitate); Full Support   Ordered at: 11/08/24 1219     Level Of Support Discussed With:    Patient     Code Status (Patient has no pulse and is not breathing):    CPR (Attempt to Resuscitate)     Medical Interventions (Patient has pulse or is breathing):    Full Support       Future Appointments   Date Time Provider Department Center   1/7/2025  8:45 AM Daron Mccarthy MD [facility]       Additional Instructions for the Follow-ups that You Need to Schedule       Ambulatory Referral to Sleep Medicine   As directed      Follow-up needed: Yes        Discharge Follow-up with  PCP   As directed       Currently Documented PCP:    Hilda Beckett PA    PCP Phone Number:    [phone]     Follow Up Details: 1 week hospital follow up                      Sarah Bernardo II, DO  11/12/24      Time Spent on Discharge:  I spent  33  minutes on this discharge activity which included: face-to-face encounter with the patient, reviewing the data in the system, coordination of the care with the nursing staff as well as consultants, documentation, and entering orders.           Electronically signed by Sarah Bernardo II, DO at 11/12/24 8001

## 2024-11-13 NOTE — CASE MANAGEMENT/SOCIAL WORK
"Case Management Discharge Note      Final Note: Pt is discharging home. He is on room air. A referral for outpatient speech therapy has been faxed to [facility] at [phone] for Pt to follow-up with Brissa Hill & information is included on AVS under \"instructions\". Their office will contact the Pt to arrange appointments. Spouse will provide transportation via private vehicle. Pt and spouse aware and agreeable to plan. No other discharge needs identified.         Selected Continued Care - Discharged on 11/12/2024 Admission date: 11/8/2024 - Discharge disposition: Home or Self Care      Destination    No services have been selected for the patient.                Durable Medical Equipment    No services have been selected for the patient.                Dialysis/Infusion    No services have been selected for the patient.                Home Medical Care    No services have been selected for the patient.                Therapy    No services have been selected for the patient.                Community Resources    No services have been selected for the patient.                Community & DME    No services have been selected for the patient.                    Selected Continued Care - Episodes Includes continued care and service providers with selected services from the active episodes listed below      Rising Risk Care Management Episode start date: 11/5/2024   There are no active outsourced providers for this episode.                 Transportation Services  Private: Car    Final Discharge Disposition Code: 01 - home or self-care  "

## 2024-11-14 ENCOUNTER — TELEMEDICINE (OUTPATIENT)
Dept: GASTROENTEROLOGY | Facility: CLINIC | Age: 49
End: 2024-11-14
Payer: COMMERCIAL

## 2024-11-14 DIAGNOSIS — K21.9 GASTROESOPHAGEAL REFLUX DISEASE, UNSPECIFIED WHETHER ESOPHAGITIS PRESENT: ICD-10-CM

## 2024-11-14 DIAGNOSIS — Z12.11 SCREEN FOR COLON CANCER: ICD-10-CM

## 2024-11-14 DIAGNOSIS — R55 SYNCOPE, UNSPECIFIED SYNCOPE TYPE: ICD-10-CM

## 2024-11-14 DIAGNOSIS — B37.81 CANDIDAL ESOPHAGITIS: ICD-10-CM

## 2024-11-14 DIAGNOSIS — R05.9 COUGH, UNSPECIFIED TYPE: ICD-10-CM

## 2024-11-14 DIAGNOSIS — K44.9 HIATAL HERNIA: Primary | ICD-10-CM

## 2024-11-14 DIAGNOSIS — R13.10 DYSPHAGIA, UNSPECIFIED TYPE: ICD-10-CM

## 2024-11-14 PROBLEM — J34.2 DEVIATED SEPTUM: Status: ACTIVE | Noted: 2024-11-14

## 2024-11-14 RX ORDER — PANTOPRAZOLE SODIUM 40 MG/1
40 TABLET, DELAYED RELEASE ORAL 2 TIMES DAILY
Qty: 180 TABLET | Refills: 3 | Status: SHIPPED | OUTPATIENT
Start: 2024-11-14

## 2024-11-14 RX ORDER — FLUCONAZOLE 200 MG/1
200 TABLET ORAL DAILY
Qty: 15 TABLET | Refills: 0 | Status: SHIPPED | OUTPATIENT
Start: 2024-11-14

## 2024-11-14 NOTE — PROGRESS NOTES
GASTROENTEROLOGY TELEHEALTH NOTE  Mode of Visit: Video  Location of patient: -HOME- [facility]  Location of provider: +[facility] [place]  You have chosen to receive care through a telehealth visit.  The patient has signed the video visit consent form.  The visit included audio and video interaction. No technical issues occurred during this visit.      Hussain Feliz  [id number]  1975    CARE TEAM  Patient Care Team:  Hilda Beckett PA as PCP - General (Physician Assistant)  Kareen Singh RN as Ambulatory  ([facility])    Referring Provider: Sarah Bernardo II, DO    HISTORY OF PRESENT ILLNESS:   Hussain Feliz is a 49 y.o. male who presents via video-conference for hospital follow-up.      He was admitted 11/8/2024 to 11/12/2024 with review of documentation on discharge summary that reveals he has been experiencing episodes of syncope and disorientation in setting of respiratory infection, pneumonia with antibiotic and steroids prescribed.  During hospital admission he underwent evaluation including CTA chest, stress test, echocardiogram, telemetry monitoring, EEG all negative for etiology of symptoms.  ENT performed flexible laryngoscopy showing mild erythema/edema and moderate collapse/spasm of laryngeal walls symptoms felt to be due to vocal cord dysfunction for which treatment was recommended.  Review of documentation by ENT revealed multifactorial laryngeal spasm brought on by irritation from reflux, recent infection and tobacco use that is causing vocal cord dysfunction along with laryngeal spasm contributing to syncopal episodes with recommendation for speech-language pathologist as an outpatient, smoking cessation recommended.    During hospital admission evaluation by speech-language pathology with FEES completed that revealed some residue after swallow but cleared after 2-3 subsequent swallows, continues reflux after each trial throughout with risk of penetration with  refluxate, coughing noted during study without aspiration, significant edema/erythema during study.  Reflux precautions recommended.  Interventions for paroxysmal vocal fold motion recommended     Proton pump inhibitor daily, lifestyle modifications for reflux, outpatient GI follow-up recommended.  Decongestant, cough suppressant also recommended.  Holter monitor placed at time of discharge.  Small hiatal hernia was identified on prior imaging.    He was prescribed clotrimazole for oral candidiasis.    He has productive cough with description of yellow color sputum.    He is taking benzonatate as prescribed, promethazine with codeine as needed, pantoprazole twice daily, clotrimazole as prescribed for treatment of oral candidiasis.    He had coughing episode around 5 AM that again led to episode of syncope/being unresponsive.  He subsequently became responsive and asked his wife (Kamila, ED nurse at [facility]) about what happened.     He has hoarseness of voice.    He has longstanding history of cough that is aggravated by postnasal drainage, respiratory illness.    He is scheduled for evaluation by sleep medicine provider in February for evaluation regarding suspected sleep apnea. He has not had prior sleep study.     Per his wife, he has deviated septum and Dr. Mehta provided information for provider (possibly Dr. Guadarrama) to consider evaluation regarding deviated septum.       Past Medical History:   Diagnosis Date    Deviated septum 11/14/2024    Heart burn     Hiatal hernia     Laryngospasm     Paradoxical vocal cord motion disorder     Pneumonia         Past Surgical History:   Procedure Laterality Date    COLONOSCOPY      HEMORRHOIDECTOMY  2013    Dr. Stahl        Current Outpatient Medications on File Prior to Visit   Medication Sig    albuterol sulfate  (90 Base) MCG/ACT inhaler Inhale 2 puffs Every 4 (Four) Hours As Needed for Wheezing.    benzonatate (TESSALON) 100 MG capsule Take  2 capsules by mouth 3 (Three) Times a Day As Needed for Cough.    clotrimazole (MYCELEX) 10 MG alma delia Take 1 tablet by mouth 5 (Five) Times a Day for 35 doses.    phenol (CHLORASEPTIC) 1.4 % liquid liquid Apply 1 spray to the mouth or throat Every 2 (Two) Hours As Needed (sore throat).    promethazine-codeine (PHENERGAN with CODEINE) 6.25-10 MG/5ML syrup Take 5 mL by mouth Every 4 (Four) Hours As Needed for Cough for up to 5 days.    pseudoephedrine (SUDAFED) 120 MG 12 hr tablet Take 1 tablet by mouth 2 (Two) Times a Day for 5 days.    [DISCONTINUED] pantoprazole (PROTONIX) 40 MG EC tablet Take 1 tablet by mouth Daily.     No current facility-administered medications on file prior to visit.       No Known Allergies    Family History   Problem Relation Age of Onset    Hypertension Mother     Obesity Mother     Arthritis Mother        Social History     Socioeconomic History    Marital status:     Number of children: 3   Tobacco Use    Smoking status: Some Days     Current packs/day: 1.00     Types: Cigarettes    Smokeless tobacco: Never    Tobacco comments:     1 pack per week   Vaping Use    Vaping status: Never Used   Substance and Sexual Activity    Alcohol use: Yes     Comment: maybe one drink week    Drug use: Never    Sexual activity: Defer       OBJECTIVE  Vital signs available: No    Physical Exam  Constitutional:       Comments: Limited exam by video  + hoarseness of voice   HENT:      Head: Normocephalic and atraumatic. No contusion.   Eyes:      General: Lids are normal.      Extraocular Movements: Extraocular movements intact.   Neck:      Trachea: Trachea normal.   Pulmonary:      Effort: No respiratory distress.      Comments: Symmetrical expansion  + cough     Musculoskeletal:      Comments: Symmetrical movement of upper extremities  Symmetrical movement of lower extremities  No visible deformities   Skin:     Coloration: Skin is not jaundiced.   Neurological:      Mental Status: He is alert.    Psychiatric:         Mood and Affect: Mood normal.         Behavior: Behavior normal.         Thought Content: Thought content normal.     Results Review  11/12/2024 FEES reviewed    CMP          11/4/2024    10:17 11/8/2024    10:34 11/9/2024    05:05   CMP   Glucose 97  103  106    BUN 13  13  13    Creatinine 0.97  1.12  0.97    EGFR 96.3  81.0  96.3    Sodium 142  143  143    Potassium 3.8  4.2  3.8    Chloride 109  106  110    Calcium 8.3  8.9  8.3    Total Protein 6.4  6.8  6.2    Albumin 4.1  4.3  3.8    Globulin 2.3  2.5  2.4    Total Bilirubin 0.6  0.8  0.7    Alkaline Phosphatase 72  79  75    AST (SGOT) 30  32  26    ALT (SGPT) 29  41  38    Albumin/Globulin Ratio 1.8  1.7  1.6    BUN/Creatinine Ratio 13.4  11.6  13.4    Anion Gap 6.6  6.7  11.0      CBC          11/4/2024    10:17 11/8/2024    10:34 11/9/2024    05:05   CBC   WBC 7.65  7.04  7.25    RBC 5.00  5.63  5.15    Hemoglobin 16.3  18.0  16.7    Hematocrit 46.8  51.5  47.4    MCV 93.6  91.5  92.0    MCH 32.6  32.0  32.4    MCHC 34.8  35.0  35.2    RDW 12.5  12.6  12.9    Platelets 211  253  232          ASSESSMENT / PLAN  1. Hiatal hernia  2. Gastroesophageal reflux disease, unspecified whether esophagitis present  3. Dysphagia, unspecified type  4. Cough, unspecified type  5. Candidal esophagitis  - plan for EGD for additional evaluation  - he had difficulty swallowing pudding at time of evaluation during hospital admission (planning for outpatient treatment with [facility])  - recommend pantoprazole twice daily, 30 minutes prior to first meal of the day and 30 minutes prior to last meal of the day  - he recently took steroid and antibiotic and was found to have oral candidiasis during hospital admission for which he is taking 7 day course of clotrimazole. After he completes 7 day course of clotrimazole, recommend fluconazole 400 mg on day 1 then 200 mg daily days 2 to 14 for possible candida esophagitis  - he is working on lifestyle modifications  for reflux  - pantoprazole (PROTONIX) 40 MG EC tablet; Take 1 tablet by mouth 2 (Two) Times a Day.  Dispense: 180 tablet; Refill: 3  - fluconazole (DIFLUCAN) 200 MG tablet; Take 1 tablet by mouth Daily. Take 2 tablets (400 mg) on day one then one tablet (200 mg) on days 2 to 14.  Dispense: 15 tablet; Refill: 0  6. Syncope, unspecified syncope type  - based off hospital evaluation and tests, it seems as though he is experiencing laryngospasm, vocal cord dysfunction, cough contributing to syncopal episodes. He should be contacted by [facility] for outpatient treatment per review of medical record/referral by hospitalist at time of admisison  - he is also wearing a heart monitor and awaiting evaluation for possible sleep apnea.     7. Screen for colon cancer  - plan for screening colonoscopy  - Ambulatory Referral For Screening Colonoscopy      Return if symptoms worsen or fail to improve. Will determine follow up plan after EGD and colonoscopy.     Laury Gusman, APRN  11/14/2024

## 2024-11-14 NOTE — Clinical Note
Hi! Will you please schedule Chuy for EGD due to cough, dysphagia, hiatal hernia and screening colonoscopy with Dr. Varela? He is wearing a heart monitor for 2 weeks so It would likely be best after 2 weeks but before year end if able. Thanks!

## 2024-11-15 ENCOUNTER — APPOINTMENT (OUTPATIENT)
Dept: CT IMAGING | Facility: HOSPITAL | Age: 49
End: 2024-11-15
Payer: COMMERCIAL

## 2024-11-15 ENCOUNTER — HOSPITAL ENCOUNTER (OUTPATIENT)
Facility: HOSPITAL | Age: 49
Discharge: HOME | End: 2024-11-17
Attending: EMERGENCY MEDICINE | Admitting: INTERNAL MEDICINE
Payer: COMMERCIAL

## 2024-11-15 DIAGNOSIS — K22.10 ESOPHAGEAL ULCER: ICD-10-CM

## 2024-11-15 DIAGNOSIS — J38.5 LARYNGEAL SPASM: ICD-10-CM

## 2024-11-15 DIAGNOSIS — S02.2XXA CLOSED FRACTURE OF NASAL BONE, INITIAL ENCOUNTER: ICD-10-CM

## 2024-11-15 DIAGNOSIS — R55 SYNCOPE AND COLLAPSE: Primary | ICD-10-CM

## 2024-11-15 DIAGNOSIS — S09.90XA INJURY OF HEAD, INITIAL ENCOUNTER: ICD-10-CM

## 2024-11-15 DIAGNOSIS — K21.9 GASTROESOPHAGEAL REFLUX DISEASE, UNSPECIFIED WHETHER ESOPHAGITIS PRESENT: ICD-10-CM

## 2024-11-15 DIAGNOSIS — S01.81XA FACIAL LACERATION, INITIAL ENCOUNTER: ICD-10-CM

## 2024-11-15 LAB
ALBUMIN SERPL-MCNC: 4.1 G/DL (ref 3.5–5.2)
ALBUMIN/GLOB SERPL: 1.5 G/DL
ALP SERPL-CCNC: 82 U/L (ref 39–117)
ALT SERPL W P-5'-P-CCNC: 38 U/L (ref 1–41)
ANION GAP SERPL CALCULATED.3IONS-SCNC: 8 MMOL/L (ref 5–15)
AST SERPL-CCNC: 30 U/L (ref 1–40)
BASOPHILS # BLD AUTO: 0.03 10*3/MM3 (ref 0–0.2)
BASOPHILS NFR BLD AUTO: 0.4 % (ref 0–1.5)
BILIRUB SERPL-MCNC: 1.1 MG/DL (ref 0–1.2)
BUN SERPL-MCNC: 9 MG/DL (ref 6–20)
BUN/CREAT SERPL: 8.3 (ref 7–25)
CALCIUM SPEC-SCNC: 8.7 MG/DL (ref 8.6–10.5)
CHLORIDE SERPL-SCNC: 105 MMOL/L (ref 98–107)
CO2 SERPL-SCNC: 27 MMOL/L (ref 22–29)
CREAT SERPL-MCNC: 1.09 MG/DL (ref 0.76–1.27)
DEPRECATED RDW RBC AUTO: 43.1 FL (ref 37–54)
EGFRCR SERPLBLD CKD-EPI 2021: 83.7 ML/MIN/1.73
EOSINOPHIL # BLD AUTO: 0.18 10*3/MM3 (ref 0–0.4)
EOSINOPHIL NFR BLD AUTO: 2.6 % (ref 0.3–6.2)
ERYTHROCYTE [DISTWIDTH] IN BLOOD BY AUTOMATED COUNT: 13 % (ref 12.3–15.4)
GLOBULIN UR ELPH-MCNC: 2.7 GM/DL
GLUCOSE SERPL-MCNC: 103 MG/DL (ref 65–99)
HCT VFR BLD AUTO: 52.7 % (ref 37.5–51)
HGB BLD-MCNC: 18.5 G/DL (ref 13–17.7)
IMM GRANULOCYTES # BLD AUTO: 0.07 10*3/MM3 (ref 0–0.05)
IMM GRANULOCYTES NFR BLD AUTO: 1 % (ref 0–0.5)
LYMPHOCYTES # BLD AUTO: 1.61 10*3/MM3 (ref 0.7–3.1)
LYMPHOCYTES NFR BLD AUTO: 22.8 % (ref 19.6–45.3)
MCH RBC QN AUTO: 32.4 PG (ref 26.6–33)
MCHC RBC AUTO-ENTMCNC: 35.1 G/DL (ref 31.5–35.7)
MCV RBC AUTO: 92.3 FL (ref 79–97)
MONOCYTES # BLD AUTO: 0.45 10*3/MM3 (ref 0.1–0.9)
MONOCYTES NFR BLD AUTO: 6.4 % (ref 5–12)
NEUTROPHILS NFR BLD AUTO: 4.71 10*3/MM3 (ref 1.7–7)
NEUTROPHILS NFR BLD AUTO: 66.8 % (ref 42.7–76)
NRBC BLD AUTO-RTO: 0 /100 WBC (ref 0–0.2)
PLATELET # BLD AUTO: 226 10*3/MM3 (ref 140–450)
PMV BLD AUTO: 9.2 FL (ref 6–12)
POTASSIUM SERPL-SCNC: 4.2 MMOL/L (ref 3.5–5.2)
PROT SERPL-MCNC: 6.8 G/DL (ref 6–8.5)
QT INTERVAL: 368 MS
QTC INTERVAL: 397 MS
RBC # BLD AUTO: 5.71 10*6/MM3 (ref 4.14–5.8)
SODIUM SERPL-SCNC: 140 MMOL/L (ref 136–145)
TROPONIN T SERPL HS-MCNC: 9 NG/L
WBC NRBC COR # BLD AUTO: 7.05 10*3/MM3 (ref 3.4–10.8)

## 2024-11-15 PROCEDURE — G0378 HOSPITAL OBSERVATION PER HR: HCPCS

## 2024-11-15 PROCEDURE — 25810000003 SODIUM CHLORIDE 0.9 % SOLUTION: Performed by: INTERNAL MEDICINE

## 2024-11-15 PROCEDURE — 99285 EMERGENCY DEPT VISIT HI MDM: CPT

## 2024-11-15 PROCEDURE — 85025 COMPLETE CBC W/AUTO DIFF WBC: CPT | Performed by: NURSE PRACTITIONER

## 2024-11-15 PROCEDURE — 96361 HYDRATE IV INFUSION ADD-ON: CPT

## 2024-11-15 PROCEDURE — 96360 HYDRATION IV INFUSION INIT: CPT

## 2024-11-15 PROCEDURE — 70450 CT HEAD/BRAIN W/O DYE: CPT

## 2024-11-15 PROCEDURE — 93005 ELECTROCARDIOGRAM TRACING: CPT | Performed by: NURSE PRACTITIONER

## 2024-11-15 PROCEDURE — 99212 OFFICE O/P EST SF 10 MIN: CPT | Performed by: INTERNAL MEDICINE

## 2024-11-15 PROCEDURE — 70486 CT MAXILLOFACIAL W/O DYE: CPT

## 2024-11-15 PROCEDURE — 80053 COMPREHEN METABOLIC PANEL: CPT | Performed by: NURSE PRACTITIONER

## 2024-11-15 PROCEDURE — 99223 1ST HOSP IP/OBS HIGH 75: CPT | Performed by: INTERNAL MEDICINE

## 2024-11-15 PROCEDURE — 84484 ASSAY OF TROPONIN QUANT: CPT | Performed by: NURSE PRACTITIONER

## 2024-11-15 RX ORDER — SODIUM CHLORIDE 0.9 % (FLUSH) 0.9 %
10 SYRINGE (ML) INJECTION AS NEEDED
Status: DISCONTINUED | OUTPATIENT
Start: 2024-11-15 | End: 2024-11-17 | Stop reason: HOSPADM

## 2024-11-15 RX ORDER — DIAZEPAM 5 MG/1
5 TABLET ORAL EVERY 6 HOURS PRN
Status: DISCONTINUED | OUTPATIENT
Start: 2024-11-15 | End: 2024-11-16

## 2024-11-15 RX ORDER — SODIUM CHLORIDE 0.9 % (FLUSH) 0.9 %
10 SYRINGE (ML) INJECTION EVERY 12 HOURS SCHEDULED
Status: DISCONTINUED | OUTPATIENT
Start: 2024-11-15 | End: 2024-11-17 | Stop reason: HOSPADM

## 2024-11-15 RX ORDER — POLYETHYLENE GLYCOL 3350 17 G/17G
17 POWDER, FOR SOLUTION ORAL DAILY PRN
Status: DISCONTINUED | OUTPATIENT
Start: 2024-11-15 | End: 2024-11-17 | Stop reason: HOSPADM

## 2024-11-15 RX ORDER — PANTOPRAZOLE SODIUM 40 MG/1
40 TABLET, DELAYED RELEASE ORAL 2 TIMES DAILY
Status: DISCONTINUED | OUTPATIENT
Start: 2024-11-15 | End: 2024-11-17 | Stop reason: HOSPADM

## 2024-11-15 RX ORDER — SODIUM CHLORIDE 9 MG/ML
100 INJECTION, SOLUTION INTRAVENOUS CONTINUOUS
Status: ACTIVE | OUTPATIENT
Start: 2024-11-15 | End: 2024-11-16

## 2024-11-15 RX ORDER — BISACODYL 5 MG/1
5 TABLET, DELAYED RELEASE ORAL DAILY PRN
Status: DISCONTINUED | OUTPATIENT
Start: 2024-11-15 | End: 2024-11-17 | Stop reason: HOSPADM

## 2024-11-15 RX ORDER — AMOXICILLIN 250 MG
2 CAPSULE ORAL 2 TIMES DAILY PRN
Status: DISCONTINUED | OUTPATIENT
Start: 2024-11-15 | End: 2024-11-17 | Stop reason: HOSPADM

## 2024-11-15 RX ORDER — DIAZEPAM 5 MG/1
5 TABLET ORAL ONCE
Status: COMPLETED | OUTPATIENT
Start: 2024-11-15 | End: 2024-11-15

## 2024-11-15 RX ORDER — BENZONATATE 100 MG/1
200 CAPSULE ORAL 3 TIMES DAILY PRN
Status: DISCONTINUED | OUTPATIENT
Start: 2024-11-15 | End: 2024-11-16

## 2024-11-15 RX ORDER — LIDOCAINE HYDROCHLORIDE 40 MG/ML
SOLUTION TOPICAL ONCE
Status: DISCONTINUED | OUTPATIENT
Start: 2024-11-15 | End: 2024-11-15

## 2024-11-15 RX ORDER — ONDANSETRON 2 MG/ML
4 INJECTION INTRAMUSCULAR; INTRAVENOUS EVERY 6 HOURS PRN
Status: DISCONTINUED | OUTPATIENT
Start: 2024-11-15 | End: 2024-11-17 | Stop reason: HOSPADM

## 2024-11-15 RX ORDER — SODIUM CHLORIDE 9 MG/ML
40 INJECTION, SOLUTION INTRAVENOUS AS NEEDED
Status: DISCONTINUED | OUTPATIENT
Start: 2024-11-15 | End: 2024-11-17 | Stop reason: HOSPADM

## 2024-11-15 RX ORDER — BISACODYL 10 MG
10 SUPPOSITORY, RECTAL RECTAL DAILY PRN
Status: DISCONTINUED | OUTPATIENT
Start: 2024-11-15 | End: 2024-11-17 | Stop reason: HOSPADM

## 2024-11-15 RX ORDER — HYDROCODONE BITARTRATE AND ACETAMINOPHEN 5; 325 MG/1; MG/1
1 TABLET ORAL EVERY 6 HOURS PRN
Status: DISCONTINUED | OUTPATIENT
Start: 2024-11-15 | End: 2024-11-17 | Stop reason: HOSPADM

## 2024-11-15 RX ADMIN — BENZONATATE 200 MG: 100 CAPSULE ORAL at 22:06

## 2024-11-15 RX ADMIN — Medication 3 ML: at 10:49

## 2024-11-15 RX ADMIN — SODIUM CHLORIDE 100 ML/HR: 9 INJECTION, SOLUTION INTRAVENOUS at 15:44

## 2024-11-15 RX ADMIN — DIAZEPAM 5 MG: 5 TABLET ORAL at 22:06

## 2024-11-15 RX ADMIN — Medication 10 ML: at 20:05

## 2024-11-15 RX ADMIN — DIAZEPAM 5 MG: 5 TABLET ORAL at 11:43

## 2024-11-15 RX ADMIN — PANTOPRAZOLE SODIUM 40 MG: 40 TABLET, DELAYED RELEASE ORAL at 20:04

## 2024-11-15 NOTE — CONSULTS
Referring Provider: Hospitalist  Reason for Consultation: Syncope    Patient Care Team:  Hilda Beckett PA as PCP - General (Physician Assistant)  Kareen Singh, RN as Ambulatory  ([facility])    Chief complaint syncope    Subjective .     History of present illness: This is a 49-year-old male that was seen recently for syncope.  Patient had episodes then where he would just lose track of where he was at more so than anything and also episodes of syncope.  Here patient has been admitted again for syncope.  Patient does have a monitor in place that was put on this past week.  Patient denies any chest pain pressure discomfort.  No shortness of breath.  Patient underwent echocardiogram EKG and stress testing this month without significant abnormality seen.  Patient thought was part to have syncope due to acute illness.  This time patient is being evaluated for dysphagia/visual spasms.  Patient is drowsy at the time of evaluation.  EKG and telemetry personally viewed here with sinus rhythm seen.    Review of Systems   Pertinent items are noted in HPI, all other systems reviewed and negative    History  Past Medical History:   Diagnosis Date    Deviated septum 11/14/2024    Heart burn     Hiatal hernia     Laryngospasm     Paradoxical vocal cord motion disorder     Pneumonia    ,   Past Surgical History:   Procedure Laterality Date    COLONOSCOPY      HEMORRHOIDECTOMY  2013    Dr. Stahl   ,   Family History   Problem Relation Age of Onset    Hypertension Mother     Obesity Mother     Arthritis Mother    ,   Social History     Socioeconomic History    Marital status:     Number of children: 3   Tobacco Use    Smoking status: Some Days     Current packs/day: 1.00     Types: Cigarettes    Smokeless tobacco: Never    Tobacco comments:     1 pack per week   Vaping Use    Vaping status: Never Used   Substance and Sexual Activity    Alcohol use: Yes     Comment: maybe one drink week    Drug use:  Never    Sexual activity: Defer     E-cigarette/Vaping    E-cigarette/Vaping Use Never User      E-cigarette/Vaping Substances     E-cigarette/Vaping Devices         ,   Medications Prior to Admission   Medication Sig Dispense Refill Last Dose/Taking    albuterol sulfate  (90 Base) MCG/ACT inhaler Inhale 2 puffs Every 4 (Four) Hours As Needed for Wheezing. (Patient taking differently: Inhale 2 puffs Every 4 (Four) Hours As Needed for Wheezing or Shortness of Air.) 8.5 g 0 Past Week    benzonatate (TESSALON) 100 MG capsule Take 2 capsules by mouth 3 (Three) Times a Day As Needed for Cough. 30 capsule 0 11/14/2024    clotrimazole (MYCELEX) 10 MG alma delia Take 1 tablet by mouth 5 (Five) Times a Day for 35 doses. 35 tablet 0 11/14/2024    fluconazole (DIFLUCAN) 200 MG tablet Take 1 tablet by mouth Daily. Take 2 tablets (400 mg) on day one then one tablet (200 mg) on days 2 to 14. 15 tablet 0 11/14/2024    pantoprazole (PROTONIX) 40 MG EC tablet Take 1 tablet by mouth 2 (Two) Times a Day. 180 tablet 3 11/14/2024    phenol (CHLORASEPTIC) 1.4 % liquid liquid Apply 1 spray to the mouth or throat Every 2 (Two) Hours As Needed (sore throat). 177 mL 0 Past Week    promethazine-codeine (PHENERGAN with CODEINE) 6.25-10 MG/5ML syrup Take 5 mL by mouth Every 4 (Four) Hours As Needed for Cough for up to 5 days. 180 mL 0 11/14/2024    pseudoephedrine (SUDAFED) 120 MG 12 hr tablet Take 1 tablet by mouth 2 (Two) Times a Day for 5 days. 10 tablet 0 11/14/2024   , Scheduled Meds:  pantoprazole, 40 mg, Oral, BID  sodium chloride, 10 mL, Intravenous, Q12H   , Continuous Infusions:  sodium chloride, 100 mL/hr   , PRN Meds:    senna-docusate sodium **AND** polyethylene glycol **AND** bisacodyl **AND** bisacodyl    Calcium Replacement - Follow Nurse / BPA Driven Protocol    Magnesium Standard Dose Replacement - Follow Nurse / BPA Driven Protocol    ondansetron    Phosphorus Replacement - Follow Nurse / BPA Driven Protocol    Potassium  Replacement - Follow Nurse / BPA Driven Protocol    [COMPLETED] Insert Peripheral IV **AND** sodium chloride    sodium chloride    sodium chloride, and Allergies:  Patient has no known allergies.    Objective     Vital Signs  Temp:  [98.2 °F (36.8 °C)-98.3 °F (36.8 °C)] 98.2 °F (36.8 °C)  Heart Rate:  [66-78] 75  Resp:  [14-16] 16  BP: (117-135)/(83-93) 135/88  Body mass index is 33.47 kg/m².  No intake or output data in the 24 hours ending 11/15/24 1422  No intake/output data recorded.    Physical Exam:      General Appearance:  Alert, cooperative, in no acute distress   Head:  Normocephalic, without obvious abnormality, atraumatic   Eyes:  Lids and lashes normal, conjunctivae and sclerae normal, no icterus, no pallor, corneas clear, PERRLA   Ears:  Ears appear intact with no abnormalities noted   Throat:  No oral lesions, no thrush, oral mucosa moist   Neck:  No adenopathy, supple, trachea midline, no thyromegaly, no carotid bruit, no JVD   Back:  No kyphosis present, no scoliosis present, no skin lesions, erythema or scars, no tenderness to percussion or palpation, range of motion normal   Lungs:  Clear to auscultation, respirations regular, even and unlabored    Heart:  Regular rhythm and normal rate, normal S1 and S2, no murmur, no gallop, no rub, no click   Chest Wall:  No abnormalities observed   Abdomen:  Normal bowel sounds, no masses, no organomegaly, soft non-tender, non-distended, no guarding, no rebound tenderness   Rectal:  Deferred   Extremities:  Moves all extremities well, no edema, no cyanosis, no redness   Pulses:  Pulses palpable and equal bilaterally   Skin:  No bleeding, bruising or rash   Lymph nodes:  No palpable adenopathy   Neurologic:  Cranial nerves 2 - 12 grossly intact, sensation intact, DTR present and equal bilaterally                                                                               Results Review:    I reviewed the patient's new clinical results.  I reviewed the  "patient's new imaging results and agree with the interpretation.  I reviewed the patient's other test results and agree with the interpretation  I personally viewed and interpreted the patient's EKG/Telemetry data       WBC WBC   Date Value Ref Range Status   11/15/2024 7.05 3.40 - 10.80 10*3/mm3 Final      HGB Hemoglobin   Date Value Ref Range Status   11/15/2024 18.5 (H) 13.0 - 17.7 g/dL Final      HCT Hematocrit   Date Value Ref Range Status   11/15/2024 52.7 (H) 37.5 - 51.0 % Final      Platlets No results found for: \"LABPLAT\"     PT/INR:    No results found for: \"PROTIME\"/No results found for: \"INR\"    Sodium Sodium   Date Value Ref Range Status   11/15/2024 140 136 - 145 mmol/L Final      Potassium Potassium   Date Value Ref Range Status   11/15/2024 4.2 3.5 - 5.2 mmol/L Final      Chloride Chloride   Date Value Ref Range Status   11/15/2024 105 98 - 107 mmol/L Final      Bicarbonate No results found for: \"PLASMABICARB\"   BUN BUN   Date Value Ref Range Status   11/15/2024 9 6 - 20 mg/dL Final      Creatinine Creatinine   Date Value Ref Range Status   11/15/2024 1.09 0.76 - 1.27 mg/dL Final      Calcium Calcium   Date Value Ref Range Status   11/15/2024 8.7 8.6 - 10.5 mg/dL Final      Magnesium No results found for: \"MG\"     pH No results found for: \"PHART\"   pO2 No results found for: \"PO2ART\"   pCO2 No results found for: \"NJK5WLU\"   HCO3 No results found for: \"BUB6OAA\"     Assessment & Plan     Patient Active Problem List   Diagnosis Code    Abnormal blood level of iron R79.0    GERD (gastroesophageal reflux disease) K21.9    Vocal cord dysfunction J38.3    GREGOR (obstructive sleep apnea) G47.33    Cigarette nicotine dependence without complication F17.210    Deviated septum J34.2    Syncope R55       Recurrent syncope  Dysphagia  Transient amnesia    Plan: Monitor cannot be interrogated at this time as patient still wearing.  Once patient is done wearing can mail him.  Can even Meliane when get home as had " events with monitor in place.  No other way of doing such at this time.  Otherwise continue to monitor on telemetry.  Patient's syncope does not appear to be a primary cardiac etiology at this point.    Daron Mccarthy MD  11/15/24  14:22 EST

## 2024-11-15 NOTE — PLAN OF CARE
Problem: Adult Inpatient Plan of Care  Goal: Plan of Care Review  Outcome: Progressing  Goal: Patient-Specific Goal (Individualized)  Outcome: Progressing  Goal: Absence of Hospital-Acquired Illness or Injury  Outcome: Progressing  Intervention: Identify and Manage Fall Risk  Recent Flowsheet Documentation  Taken 11/15/2024 1800 by Kelsey Combs RN  Safety Promotion/Fall Prevention:   activity supervised   clutter free environment maintained   fall prevention program maintained   nonskid shoes/slippers when out of bed   room organization consistent   safety round/check completed  Taken 11/15/2024 1600 by Kelsey Combs RN  Safety Promotion/Fall Prevention:   activity supervised   clutter free environment maintained   fall prevention program maintained   nonskid shoes/slippers when out of bed   room organization consistent   safety round/check completed  Taken 11/15/2024 1353 by Kelsey Combs RN  Safety Promotion/Fall Prevention:   activity supervised   clutter free environment maintained   fall prevention program maintained   nonskid shoes/slippers when out of bed   room organization consistent   safety round/check completed  Intervention: Prevent Skin Injury  Recent Flowsheet Documentation  Taken 11/15/2024 1800 by Kelsey Combs RN  Body Position: position changed independently  Skin Protection:   incontinence pads utilized   transparent dressing maintained  Taken 11/15/2024 1600 by Kelsey Combs RN  Body Position: position changed independently  Skin Protection:   incontinence pads utilized   transparent dressing maintained  Taken 11/15/2024 1353 by Kelsey Combs RN  Body Position: position changed independently  Skin Protection:   incontinence pads utilized   transparent dressing maintained  Intervention: Prevent Infection  Recent Flowsheet Documentation  Taken 11/15/2024 1800 by Kelsey Combs RN  Infection Prevention:   environmental surveillance performed   hand hygiene promoted   rest/sleep promoted    single patient room provided  Taken 11/15/2024 1600 by Kelsey Combs, RN  Infection Prevention:   environmental surveillance performed   hand hygiene promoted   rest/sleep promoted   single patient room provided  Taken 11/15/2024 1353 by Kelsey Combs RN  Infection Prevention:   environmental surveillance performed   hand hygiene promoted   rest/sleep promoted   single patient room provided  Goal: Optimal Comfort and Wellbeing  Outcome: Progressing  Intervention: Provide Person-Centered Care  Recent Flowsheet Documentation  Taken 11/15/2024 1600 by Kelsey Combs RN  Trust Relationship/Rapport:   care explained   emotional support provided   empathic listening provided   questions answered   thoughts/feelings acknowledged  Taken 11/15/2024 1353 by Kelsey Combs RN  Trust Relationship/Rapport:   care explained   emotional support provided   empathic listening provided   questions answered   questions encouraged   reassurance provided   thoughts/feelings acknowledged  Goal: Readiness for Transition of Care  Outcome: Progressing  Intervention: Mutually Develop Transition Plan  Recent Flowsheet Documentation  Taken 11/15/2024 1356 by Kelsey Combs RN  Equipment Currently Used at Home: none  Transportation Anticipated: family or friend will provide  Transportation Concerns: none  Patient/Family Anticipated Services at Transition: none  Patient/Family Anticipates Transition to: home with family   Goal Outcome Evaluation:      Patient had episode of passing out at 1720. Heart rate went from 65 bpm to 126 bpm. Sent secure chat to MD. No new orders at this time. ENT saw patient and plans on consulting pulmonology.   Plan of care continues.

## 2024-11-15 NOTE — ED PROVIDER NOTES
EMERGENCY DEPARTMENT ENCOUNTER    Pt Name: Hussain Feliz  MRN: [id number]  Pt :   1975  Room Number:  S509/1  Date of encounter:  11/15/2024  PCP: Hilda Beckett PA  ED Provider: IRENE Tubbs    Historian: Patient    HPI:  Chief Complaint: Syncope, head injury, facial injury.    Context: Hussain Feliz is a 49 y.o. male who presents to the ED c/o syncope, head injury, facial injury.  Patient has had multiple syncopal episodes over the past couple weeks.  Patient has been in and out of the hospital.  The syncopal episodes typically occur when the patient has a coughing spell.  Wife is a nurse well-known to the department.  She advises that typically he will cough until he passes out however there have been moments when he has had a period of stridor and then will go out.  She advises that he turned red it is like he is choking.  There has been an incident where he turns blue as well.  She explains that the patient was recently admitted had a stress test, MRI of his brain, CTA of his neck and chest.  Patient has had a continuous EEG and is currently wearing a Holter monitor.  Consults have been made with cardiology, ENT, speech pathology and pulmonology.  It is thought that he is having laryngeal spasms, vocal cord dysfunction.  Patient is currently on Protonix and is also on a medicine for candidiasis of his esophagus.  Today the patient had an episode and hit his head.  Patient has a small laceration to his nasal bridge.  Patient has bruising to the left side of his face.  HPI     REVIEW OF SYSTEMS  A chief complaint appropriate review of systems was completed and is negative except as noted in the HPI.     PAST MEDICAL HISTORY  Past Medical History:   Diagnosis Date    Deviated septum 2024    Heart burn     Hiatal hernia     Laryngospasm     Paradoxical vocal cord motion disorder     Pneumonia        PAST SURGICAL HISTORY  Past Surgical History:   Procedure Laterality Date     COLONOSCOPY      HEMORRHOIDECTOMY  2013    Dr. Stahl       FAMILY HISTORY  Family History   Problem Relation Age of Onset    Hypertension Mother     Obesity Mother     Arthritis Mother        SOCIAL HISTORY  Social History     Socioeconomic History    Marital status:     Number of children: 3   Tobacco Use    Smoking status: Some Days     Current packs/day: 1.00     Types: Cigarettes    Smokeless tobacco: Never    Tobacco comments:     1 pack per week   Vaping Use    Vaping status: Never Used   Substance and Sexual Activity    Alcohol use: Yes     Comment: maybe one drink week    Drug use: Never    Sexual activity: Defer       ALLERGIES  Patient has no known allergies.    PHYSICAL EXAM  Physical Exam  Vitals and nursing note reviewed.   Constitutional:       General: He is not in acute distress.     Appearance: He is not ill-appearing or toxic-appearing.      Comments: Patient is calm and cooperative.  Wife at bedside.   HENT:      Head: Normocephalic.        Right Ear: Tympanic membrane normal.      Left Ear: Tympanic membrane normal.      Nose: Nasal deformity, signs of injury and nasal tenderness present.      Mouth/Throat:      Mouth: Mucous membranes are moist.   Eyes:      Extraocular Movements: Extraocular movements intact.      Conjunctiva/sclera: Conjunctivae normal.      Pupils: Pupils are equal, round, and reactive to light.      Comments: No eye entrapment   Cardiovascular:      Rate and Rhythm: Normal rate and regular rhythm.      Pulses: Normal pulses.      Heart sounds: Normal heart sounds.   Pulmonary:      Effort: Pulmonary effort is normal.      Breath sounds: Normal breath sounds.   Musculoskeletal:      Cervical back: Normal. No tenderness. Normal range of motion.      Thoracic back: Normal. No tenderness. Normal range of motion.      Lumbar back: Normal. No tenderness. Normal range of motion.   Skin:     General: Skin is warm and dry.      Findings: Bruising present.   Neurological:       General: No focal deficit present.      Mental Status: He is alert and oriented to person, place, and time.   Psychiatric:         Mood and Affect: Mood normal.         Behavior: Behavior normal.           LAB RESULTS  Results for orders placed or performed during the hospital encounter of 11/15/24   ECG 12 Lead Syncope    Collection Time: 11/15/24 10:34 AM   Result Value Ref Range    QT Interval 368 ms    QTC Interval 397 ms   CBC Auto Differential    Collection Time: 11/15/24 10:38 AM    Specimen: Blood   Result Value Ref Range    WBC 7.05 3.40 - 10.80 10*3/mm3    RBC 5.71 4.14 - 5.80 10*6/mm3    Hemoglobin 18.5 (H) 13.0 - 17.7 g/dL    Hematocrit 52.7 (H) 37.5 - 51.0 %    MCV 92.3 79.0 - 97.0 fL    MCH 32.4 26.6 - 33.0 pg    MCHC 35.1 31.5 - 35.7 g/dL    RDW 13.0 12.3 - 15.4 %    RDW-SD 43.1 37.0 - 54.0 fl    MPV 9.2 6.0 - 12.0 fL    Platelets 226 140 - 450 10*3/mm3    Neutrophil % 66.8 42.7 - 76.0 %    Lymphocyte % 22.8 19.6 - 45.3 %    Monocyte % 6.4 5.0 - 12.0 %    Eosinophil % 2.6 0.3 - 6.2 %    Basophil % 0.4 0.0 - 1.5 %    Immature Grans % 1.0 (H) 0.0 - 0.5 %    Neutrophils, Absolute 4.71 1.70 - 7.00 10*3/mm3    Lymphocytes, Absolute 1.61 0.70 - 3.10 10*3/mm3    Monocytes, Absolute 0.45 0.10 - 0.90 10*3/mm3    Eosinophils, Absolute 0.18 0.00 - 0.40 10*3/mm3    Basophils, Absolute 0.03 0.00 - 0.20 10*3/mm3    Immature Grans, Absolute 0.07 (H) 0.00 - 0.05 10*3/mm3    nRBC 0.0 0.0 - 0.2 /100 WBC   Comprehensive Metabolic Panel    Collection Time: 11/15/24 11:55 AM    Specimen: Blood   Result Value Ref Range    Glucose 103 (H) 65 - 99 mg/dL    BUN 9 6 - 20 mg/dL    Creatinine 1.09 0.76 - 1.27 mg/dL    Sodium 140 136 - 145 mmol/L    Potassium 4.2 3.5 - 5.2 mmol/L    Chloride 105 98 - 107 mmol/L    CO2 27.0 22.0 - 29.0 mmol/L    Calcium 8.7 8.6 - 10.5 mg/dL    Total Protein 6.8 6.0 - 8.5 g/dL    Albumin 4.1 3.5 - 5.2 g/dL    ALT (SGPT) 38 1 - 41 U/L    AST (SGOT) 30 1 - 40 U/L    Alkaline Phosphatase 82 39 - 117  U/L    Total Bilirubin 1.1 0.0 - 1.2 mg/dL    Globulin 2.7 gm/dL    A/G Ratio 1.5 g/dL    BUN/Creatinine Ratio 8.3 7.0 - 25.0    Anion Gap 8.0 5.0 - 15.0 mmol/L    eGFR 83.7 >60.0 mL/min/1.73   Single High Sensitivity Troponin T    Collection Time: 11/15/24 11:55 AM    Specimen: Blood   Result Value Ref Range    HS Troponin T 9 <22 ng/L       If labs were ordered, I independently reviewed the results and considered them in treating the patient.    RADIOLOGY  CT Head Without Contrast   Final Result   Impression:   No acute intracranial traumatic injury.      Mild soft tissue swelling of the nose with irregularity of the distal nasal bone which may reflect nondisplaced fracture. Recommend correlation with any focal pain here.            Electronically Signed: Martin Huerta MD     11/15/2024 11:05 AM EST     Workstation ID: [id number]      CT Facial Bones Without Contrast   Final Result   Impression:   No acute intracranial traumatic injury.      Mild soft tissue swelling of the nose with irregularity of the distal nasal bone which may reflect nondisplaced fracture. Recommend correlation with any focal pain here.            Electronically Signed: Martin Huerta MD     11/15/2024 11:05 AM EST     Workstation ID: [id number]        [] Radiologist's Report Reviewed:  I ordered and independently interpreted the above noted radiographic studies.  See radiologist's dictation for official interpretation.      PROCEDURES    Laceration Repair    Date/Time: 11/15/2024 12:51 PM    Performed by: Maddie Diaz APRN  Authorized by: Thad Gong MD    Consent:     Consent obtained:  Verbal    Consent given by:  Patient    Risks, benefits, and alternatives were discussed: yes      Risks discussed:  Pain, poor cosmetic result and poor wound healing    Alternatives discussed:  No treatment  Universal protocol:     Procedure explained and questions answered to patient or proxy's satisfaction: yes      Patient identity  confirmed:  Verbally with patient  Anesthesia:     Anesthesia method:  Topical application    Topical anesthetic:  LET  Laceration details:     Location:  Face    Face location:  Nose    Length (cm):  1  Pre-procedure details:     Preparation:  Patient was prepped and draped in usual sterile fashion  Exploration:     Hemostasis achieved with:  LET    Imaging obtained comment:  Ct facial / ct head    Imaging outcome: foreign body not noted      Wound exploration: wound explored through full range of motion      Contaminated: no    Treatment:     Area cleansed with:  Saline    Amount of cleaning:  Standard    Irrigation solution:  Sterile saline    Irrigation method:  Syringe    Visualized foreign bodies/material removed: no      Debridement:  None    Undermining:  None    Scar revision: no    Skin repair:     Repair method:  Sutures    Suture size:  5-0    Suture material:  Prolene    Suture technique:  Simple interrupted    Number of sutures:  3  Approximation:     Approximation:  Close  Repair type:     Repair type:  Simple  Post-procedure details:     Dressing:  Open (no dressing)    Procedure completion:  Tolerated      ECG 12 Lead Syncope   Preliminary Result   Test Reason : Syncope   Blood Pressure :   */*   mmHG   Vent. Rate :  70 BPM     Atrial Rate :  70 BPM      P-R Int : 148 ms          QRS Dur :  92 ms       QT Int : 368 ms       P-R-T Axes :  30  21  16 degrees     QTcB Int : 397 ms      Normal sinus rhythm   Normal ECG   When compared with ECG of 09-Nov-2024 07:06,   No significant change was found      Referred By: ED MD           Confirmed By:           MEDICATIONS GIVEN IN ER    Medications   sodium chloride 0.9 % flush 10 mL (has no administration in time range)   pantoprazole (PROTONIX) EC tablet 40 mg (has no administration in time range)   sodium chloride 0.9 % flush 10 mL (has no administration in time range)   sodium chloride 0.9 % flush 10 mL (has no administration in time range)   sodium  chloride 0.9 % infusion 40 mL (has no administration in time range)   sodium chloride 0.9 % infusion (has no administration in time range)   Potassium Replacement - Follow Nurse / BPA Driven Protocol (has no administration in time range)   Magnesium Standard Dose Replacement - Follow Nurse / BPA Driven Protocol (has no administration in time range)   Phosphorus Replacement - Follow Nurse / BPA Driven Protocol (has no administration in time range)   Calcium Replacement - Follow Nurse / BPA Driven Protocol (has no administration in time range)   sennosides-docusate (PERICOLACE) 8.6-50 MG per tablet 2 tablet (has no administration in time range)     And   polyethylene glycol (MIRALAX) packet 17 g (has no administration in time range)     And   bisacodyl (DULCOLAX) EC tablet 5 mg (has no administration in time range)     And   bisacodyl (DULCOLAX) suppository 10 mg (has no administration in time range)   ondansetron (ZOFRAN) injection 4 mg (has no administration in time range)   Lido-EPINEPHrine-Tetracaine 4-0.18-0.5 % gel 3 mL (3 mL Topical Given 11/15/24 1049)   diazePAM (VALIUM) tablet 5 mg (5 mg Oral Given 11/15/24 1143)       MEDICAL DECISION MAKING, PROGRESS, and CONSULTS   Medical Decision Making  Hussain Feliz is a 49 y.o. male who presents to the ED c/o syncope, head injury, facial injury.  Patient has had multiple syncopal episodes over the past couple weeks.  Patient has been in and out of the hospital.  The syncopal episodes typically occur when the patient has a coughing spell.  Wife is a nurse well-known to the department.  She advises that typically he will cough until he passes out however there have been moments when he has had a period of stridor and then will go out.  She advises that he turned red it is like he is choking.  There has been an incident where he turns blue as well.  She explains that the patient was recently admitted had a stress test, MRI of his brain, CTA of his neck and chest.   Patient has had a continuous EEG and is currently wearing a Holter monitor.  Consults have been made with cardiology, ENT, speech pathology and pulmonology.  It is thought that he is having laryngeal spasms, vocal cord dysfunction.  Patient is currently on Protonix and is also on a medicine for candidiasis of his esophagus.  Today the patient had an episode and hit his head.  Patient has a small laceration to his nasal bridge.  Patient has bruising to the left side of his face.      Problems Addressed:  Closed fracture of nasal bone, initial encounter: complicated acute illness or injury     Details: CT of the face is positive for nasal bone fracture.  Facial laceration, initial encounter: complicated acute illness or injury     Details: Facial laceration was repaired.  Patient tolerated well.  Injury of head, initial encounter: complicated acute illness or injury  Laryngeal spasm: undiagnosed new problem with uncertain prognosis     Details: Patient is having laryngeal spasms.  This is a new diagnosis patient needs further workup.  Syncope and collapse: complicated acute illness or injury     Details: Patient has had issues with syncope over the past couple weeks.     Amount and/or Complexity of Data Reviewed  Labs: ordered. Decision-making details documented in ED Course.  Radiology: ordered. Decision-making details documented in ED Course.  ECG/medicine tests: ordered. Decision-making details documented in ED Course.    Risk  Prescription drug management.  Decision regarding hospitalization.        Discussion below represents my analysis of pertinent findings related to patient's condition, differential diagnosis, treatment plan and final disposition.    Differential diagnosis:  The differential diagnosis associated with the patient's presentation includes: syncope, laryngeal spasm, vocal cord dysfunction, arrhythmia    Additional sources  Discussed/ obtained information from independent historians:   [x]  Spouse  [] Parent  [] Family member  [] Friend  [] EMS   [] Other:  External (non-ED) record review:   [x] Inpatient record:   [] Office record:   [x] Outpatient record:   [x] Prior Outpatient labs:   [x] Prior Outpatient radiology:   [] Primary Care record:   [] Outside ED record:   [] Other:   Patient's care impacted by:   [] Diabetes  [] Hypertension  [] Hyperlipidemia  [] Hypothyroidism   [] Coronary Artery Disease   [] COPD   [] Cancer   [] Obesity  [x] GERD   [] Tobacco Abuse   [] Substance Abuse    [] Anxiety   [] Depression   [x] Other: Laryngospasm, vocal cord dysfunction.  Care significantly affected by Social Determinants of Health (housing and economic circumstances, unemployment)    [] Yes     [x] No   If yes, Patient's care significantly limited by  Social Determinants of Health including:   [] Inadequate housing   [] Low income   [] Alcoholism and drug addiction in family   [] Problems related to primary support group   [] Unemployment   [] Problems related to employment   [] Other Social Determinants of Health:   Shared decision making: Shared decision making with patient and spouse.  Wound was repaired.  Patient tolerated well.    CT of the head is negative for acute findings.  Patient does have a nasal fracture.  After talking with the emergency room physician we have concerns for the patient's safety.  Patient has had 3 syncopal episodes in the past 24 hours.  After talking with the patient's spouse these episodes are getting worse and worse.  The plan will be to admit the patient to the hospitalist service.    Orders placed during this visit:  Orders Placed This Encounter   Procedures    Laceration Repair    CT Head Without Contrast    CT Facial Bones Without Contrast    Comprehensive Metabolic Panel    Single High Sensitivity Troponin T    CBC Auto Differential    NPO Diet NPO Type: Strict NPO    Supplies To Bedside - Notify MD When Ready- Suture Tray / Cart; Sterile Gloves: 7; Suture Required:  Prolene; Size: 5.0    Vital Signs    Intake & Output    Weigh Patient    Oral Care    Saline Lock & Maintain IV Access    Place Sequential Compression Device    Maintain Sequential Compression Device    Code Status and Medical Interventions: CPR (Attempt to Resuscitate); Full Support    Inpatient Gastroenterology Consult    Inpatient Cardiology Consult    OT Consult: Eval & Treat ADL Performance Below Baseline    PT Consult: Eval & Treat Functional Mobility Below Baseline    ECG 12 Lead Syncope    Insert Peripheral IV    Insert Peripheral IV    Initiate Observation Status    CBC & Differential       I considered prescription management  with:   [] Pain medication  [] Antiviral  [] Antibiotic   [] Other:   Rationale:  Additional orders considered but not ordered:  The following testing was considered but ultimately not selected after discussion with patient/family:    ED Course:    ED Course as of 11/15/24 1422   Fri Nov 15, 2024   1300 Dr. Gong at bedside to assess the patient. [KG]   1330 We will consult ENT.  Dr. Janine glynn. [KG]   1354 Dr. Mehta notified that the patient is going to be admitted. [KG]      ED Course User Index  [KG] Maddie Diaz APRN            DIAGNOSIS  Final diagnoses:   Syncope and collapse   Laryngeal spasm   Injury of head, initial encounter   Closed fracture of nasal bone, initial encounter   Facial laceration, initial encounter       DISPOSITION    ED Disposition       ED Disposition   Decision to Admit    Condition   --    Comment   Level of Care: Telemetry [5]   Diagnosis: Syncope [206001]                 Please note that portions of this document were completed with voice recognition software.       Maddie Diaz APRN  11/15/24 1422

## 2024-11-15 NOTE — H&P
[facility] Medicine Services  PROGRESS NOTE    Patient Name: Hussain Feliz  : 1975  MRN: [id number]    Date of Admission: 11/15/2024  Primary Care Physician: Hilda Beckett PA    Subjective   Subjective     CC:Syncope    HPI:Hussain Feliz is a 49 y.o. male with history of vocal cord dysfunction, GREGOR, GERD, tobacco abuse, recent admission here for syncope and disorientation in the setting of viral infection/bronchitis.  Seen by ENT and pulmonary he did undergo flexible laryngoscopy was treated with steroids and antibiotics and discharged home .  He presents back to the ED after having approximately multiple syncopal episodes in the past 24 hours with significant trauma, bruising, nose fracture.  These episodes are mostly associated with prolonged coughing fits, he denies any chest pain, no fevers or chills no nausea or vomiting.  On arrival here he is hemodynamically stable, labs show Hg 18 otherwise unremarkable, EKG with NSR, CT head with unremarkable, there was soft tissue swelling in the nose.  We Will admit to hospital medicine service for further evaluation.    Objective   Objective     Vital Signs:   Temp:  [98.2 °F (36.8 °C)-98.3 °F (36.8 °C)] 98.2 °F (36.8 °C)  Heart Rate:  [66-78] 75  Resp:  [14-16] 16  BP: (117-135)/(83-93) 135/88     Physical Exam:  Constitutional: Awake, alert  Eyes: PERRLA, sclerae anicteric, no conjunctival injection  HENT: Multiple bruising on the face/nose  Neck: Supple, no thyromegaly, no lymphadenopathy, trachea midline  Respiratory: Clear to auscultation bilaterally, nonlabored respirations   Cardiovascular: RRR, no murmurs, rubs, or gallops, palpable pedal pulses bilaterally  Gastrointestinal: Positive bowel sounds, soft, nontender, nondistended  Musculoskeletal: No bilateral ankle edema, no clubbing or cyanosis to extremities  Psychiatric: Appropriate affect, cooperative  Neurologic: Oriented x 3, strength symmetric in all  extremities, Cranial Nerves grossly intact to confrontation, speech clear    Results Reviewed:  LAB RESULTS:      Lab 11/15/24  1155 11/15/24  1038 11/09/24  0505 11/08/24  1803   WBC  --  7.05 7.25  --    HEMOGLOBIN  --  18.5* 16.7  --    HEMATOCRIT  --  52.7* 47.4  --    PLATELETS  --  226 232  --    NEUTROS ABS  --  4.71  --   --    IMMATURE GRANS (ABS)  --  0.07*  --   --    LYMPHS ABS  --  1.61  --   --    MONOS ABS  --  0.45  --   --    EOS ABS  --  0.18  --   --    MCV  --  92.3 92.0  --    HSTROP T 9  --  10 9         Lab 11/15/24  1155 11/09/24  0505   SODIUM 140 143   POTASSIUM 4.2 3.8   CHLORIDE 105 110*   CO2 27.0 22.0   ANION GAP 8.0 11.0   BUN 9 13   CREATININE 1.09 0.97   EGFR 83.7 96.3   GLUCOSE 103* 106*   CALCIUM 8.7 8.3*         Lab 11/15/24  1155 11/09/24  0505   TOTAL PROTEIN 6.8 6.2   ALBUMIN 4.1 3.8   GLOBULIN 2.7 2.4   ALT (SGPT) 38 38   AST (SGOT) 30 26   BILIRUBIN 1.1 0.7   ALK PHOS 82 75         Lab 11/15/24  1155 11/09/24  0505 11/08/24  1803 11/08/24  1436   HSTROP T 9 10 9 8             Lab 11/09/24  0505   VITAMIN B 12 582         Lab 11/08/24  1519   PH, ARTERIAL 7.408   PCO2, ARTERIAL 40.1   PO2 ART 62.9*   FIO2 21   HCO3 ART 25.3   BASE EXCESS ART 0.6   CARBOXYHEMOGLOBIN 0.9     Brief Urine Lab Results  (Last result in the past 365 days)        Color   Clarity   Blood   Leuk Est   Nitrite   Protein   CREAT   Urine HCG        11/08/24 1555 Yellow   Clear   Negative   Negative   Negative   Negative                   Microbiology Results Abnormal       None            CT Head Without Contrast    Result Date: 11/15/2024  CT HEAD WO CONTRAST, CT FACIAL BONES WO CONTRAST Date of Exam: 11/15/2024 10:21 AM EST Indication: head injury. Comparison: 11/8/2024 CTA head neck Technique: Axial CT images were obtained of the head and facial bones without contrast administration.  Automated exposure control and iterative construction methods were used. Findings: HEAD: Parenchyma:No acute  intraparenchymal hemorrhage. No loss of gray-white differentiation to suggest large territory infarct. Normal parenchymal volume. No substantial white matter disease. No midline shift or herniation. Ventricles and extra axial spaces:Normal caliber of ventricles and sulci. No extra axial fluid collection seen. Other:Orbits are grossly intact. Scattered paranasal sinus mucosal thickening. Mastoid air cells are clear. Calvarium is intact. No substantial intracranial atherosclerotic calcification. FACE: Bones: Some irregularity of the distal nasal bone represent nondisplaced fracture. No evidence of fracture otherwise. Soft tissue: Mild swelling of the nose. No suspicious adenopathy. Aerodigestive tract is grossly patent. No soft tissue mass or fluid collection.     Impression: Impression: No acute intracranial traumatic injury. Mild soft tissue swelling of the nose with irregularity of the distal nasal bone which may reflect nondisplaced fracture. Recommend correlation with any focal pain here. Electronically Signed: Martin Huerta MD  11/15/2024 11:05 AM EST  Workstation ID: [id number]    CT Facial Bones Without Contrast    Result Date: 11/15/2024  CT HEAD WO CONTRAST, CT FACIAL BONES WO CONTRAST Date of Exam: 11/15/2024 10:21 AM EST Indication: head injury. Comparison: 11/8/2024 CTA head neck Technique: Axial CT images were obtained of the head and facial bones without contrast administration.  Automated exposure control and iterative construction methods were used. Findings: HEAD: Parenchyma:No acute intraparenchymal hemorrhage. No loss of gray-white differentiation to suggest large territory infarct. Normal parenchymal volume. No substantial white matter disease. No midline shift or herniation. Ventricles and extra axial spaces:Normal caliber of ventricles and sulci. No extra axial fluid collection seen. Other:Orbits are grossly intact. Scattered paranasal sinus mucosal thickening. Mastoid air cells are clear.  Calvarium is intact. No substantial intracranial atherosclerotic calcification. FACE: Bones: Some irregularity of the distal nasal bone represent nondisplaced fracture. No evidence of fracture otherwise. Soft tissue: Mild swelling of the nose. No suspicious adenopathy. Aerodigestive tract is grossly patent. No soft tissue mass or fluid collection.     Impression: Impression: No acute intracranial traumatic injury. Mild soft tissue swelling of the nose with irregularity of the distal nasal bone which may reflect nondisplaced fracture. Recommend correlation with any focal pain here. Electronically Signed: Martin Huerta MD  11/15/2024 11:05 AM EST  Workstation ID: [id number]     Results for orders placed during the hospital encounter of 11/08/24    Adult Transthoracic Echo Complete w/ Color, Spectral and Contrast (If Necessary Per Protocol)    Interpretation Summary    Left ventricular systolic function is normal. Calculated left ventricular 3D EF = 63% Left ventricular ejection fraction appears to be 61 - 65%.    Trace mitral valve regurgitation is present.    Normal left atrial size and volume noted.      Current medications:  Scheduled Meds:   Continuous Infusions:   PRN Meds:.  [COMPLETED] Insert Peripheral IV **AND** sodium chloride    Assessment & Plan   Assessment & Plan     Active Hospital Problems    Diagnosis  POA    Syncope [R55]  Yes    GERD (gastroesophageal reflux disease) [K21.9]  Yes      Resolved Hospital Problems   No resolved problems to display.      Brief Hospital Course to date:  Hussain Feliz is a 49 y.o. male with history of vocal cord dysfunction, GREGOR, GERD, tobacco abuse, recent admission here for syncope and disorientation in the setting of viral infection/bronchitis, seen by ENT and pulmonary treated with steroids and antibiotics, s/p flex laryngoscopy discharged home 11/12  He presents back to the ED after having approximately 3 syncopal episodes in the past 24 hours with significant  trauma, bruising, nose fracture.      Syncope likely secondary to vocal cord dysfunction/laryngospasms .  -He is s/p extensive evaluation and w/u during his last hospitalization, this did include CTA chest, stress test, echo, EEG and telemonitoring all that were negative  -Etiology unknown but suspected to be secondary to syncope remains unknown, but is suspected to be secondary reversible vasovagal syncope from laryngospasms .   -He did undergo flexible laryngoscopy with ENT during the last admission, anticipate he will need a reevaluation.  ENT consulted by the ER physician, previously seen by Dr. Mehta  -He did receive Valium in the ED for possible spasms, will defer further benzos to ENT  -Patient currently has a Holter in place, will have cardiology evaluate and interrogate monitor    Dysphagia  Chronic cough  -Patient reports having sensation of food getting stuck in his throat, was previously seen by SLP  -He did have an upcoming GI appointment for EGD, but suspect they may need to reevaluate sooner as suspect this could be contributory to above, GI will see this afternoon.    GERD  -continue PPI    Hemoconcentration  -Suspect patient is dry, we will start IV fluids 100 mL/h for 12 hours    Expected Discharge Location and Transportation: TBD  Expected Discharge   Expected discharge date/ time has not been documented.     VTE Prophylaxis:  Mechanical VTE prophylaxis orders are signed & held.       CODE STATUS:   Code Status and Medical Interventions: CPR (Attempt to Resuscitate); Full Support   Ordered at: 11/15/24 1355     Code Status (Patient has no pulse and is not breathing):    CPR (Attempt to Resuscitate)     Medical Interventions (Patient has pulse or is breathing):    Full Support       Thad Gong MD  11/15/24

## 2024-11-15 NOTE — CASE MANAGEMENT/SOCIAL WORK
Discharge Planning Assessment  [facility]     Patient Name: Hussain Feliz  MRN: [id number]  Today's Date: 11/15/2024    Admit Date: 11/15/2024    Plan: IDP   Discharge Needs Assessment       Row Name 11/15/24 1331       Living Environment    People in Home spouse    Name(s) of People in Home Kamila Feliz    Current Living Arrangements home    Potentially Unsafe Housing Conditions unable to assess    In the past 12 months has the electric, gas, oil, or water company threatened to shut off services in your home? No    Primary Care Provided by self    Provides Primary Care For no one    Family Caregiver if Needed spouse    Family Caregiver Names Kamila Feliz    Quality of Family Relationships unable to assess    Able to Return to Prior Arrangements yes       Resource/Environmental Concerns    Resource/Environmental Concerns none    Transportation Concerns none       Transportation Needs    In the past 12 months, has lack of transportation kept you from medical appointments or from getting medications? no    In the past 12 months, has lack of transportation kept you from meetings, work, or from getting things needed for daily living? No       Food Insecurity    Within the past 12 months, you worried that your food would run out before you got the money to buy more. Never true    Within the past 12 months, the food you bought just didn't last and you didn't have money to get more. Never true       Transition Planning    Patient/Family Anticipates Transition to home with family    Transportation Anticipated family or friend will provide       Discharge Needs Assessment    Readmission Within the Last 30 Days unable to assess    Equipment Currently Used at Home none    Do you want help finding or keeping work or a job? I do not need or want help    Do you want help with school or training? For example, starting or completing job training or getting a high school diploma, GED or equivalent No                    Discharge Plan       Row Name 11/15/24 1335       Plan    Plan IDP    Plan Comments Pt. has recently discharged from [facility] 11/[facility]/24. Pt. lives with spouse Kamila Feliz in [facility]. Pt.'s PCP is Hilda Beckett. Pt.'s pharmacy is [facility]. Pt.'s insurance is [employer]. Pt. is independent at baseline. Pt. doesn't have DME, O2, or HH currently. Pt. has transportation back home when medically ready. Pt. doesn't have an advanced directive or ACP documentation on file. CM will continue to follow pt. throughout his stay.                  Continued Care and Services - Admitted Since 11/15/2024    No active coordination exists for this encounter.       Selected Continued Care - Episodes Includes continued care and service providers with selected services from the active episodes listed below      Rising Risk Care Management Episode start date: 11/5/2024   There are no active outsourced providers for this episode.                    Demographic Summary       Row Name 11/15/24 1328       General Information    Admission Type observation    Arrived From home    Referral Source admission list;emergency department    Reason for Consult discharge planning    Preferred Language English                   Functional Status       Row Name 11/15/24 1329       Physical Activity    On average, how many days per week do you engage in moderate to strenuous exercise (like a brisk walk)? 5 days    On average, how many minutes do you engage in exercise at this level? 30 min    Number of minutes of exercise per week 150       Functional Status, IADL    Medications independent    Meal Preparation independent    Housekeeping independent    Laundry independent    Shopping independent    If for any reason you need help with day-to-day activities such as bathing, preparing meals, shopping, managing finances, etc., do you get the help you need? I don't need any help       Mental Status Summary    Recent Changes in Mental Status/Cognitive  Functioning unable to assess       Employment/    Employment Status employed full-time                   Psychosocial       Row Name 11/15/24 1331       Mental Health    Little interest or pleasure in doing things Not at all    Feeling down, depressed, or hopeless Not at all       Stress    Do you feel stress - tense, restless, nervous, or anxious, or unable to sleep at night because your mind is troubled all the time - these days? Not at all                   Abuse/Neglect    No documentation.                  Legal       Row Name 11/15/24 1331       Financial Resource Strain    How hard is it for you to pay for the very basics like food, housing, medical care, and heating? Not hard                   Substance Abuse    No documentation.                  Patient Forms    No documentation.                     JAYE Capps

## 2024-11-15 NOTE — ED NOTES
Hussain Feliz    Nursing Report ED to Floor:  Mental status: A&Ox4  Ambulatory status: independent   Oxygen Therapy:  RA  Cardiac Rhythm: NSR  Admitted from: home/ED  Safety Concerns:  Laryngeal spasms  Social Issues: none  ED Room #:  14    ED Nurse Phone Extension - [phone] or may call [phone].      HPI:   Chief Complaint   Patient presents with    Syncope       Past Medical History:  Past Medical History:   Diagnosis Date    Deviated septum 11/14/2024    Heart burn     Hiatal hernia     Laryngospasm     Paradoxical vocal cord motion disorder     Pneumonia         Past Surgical History:  Past Surgical History:   Procedure Laterality Date    COLONOSCOPY      HEMORRHOIDECTOMY  2013    Dr. Stahl        Admitting Doctor:   Thad Gong MD    Consulting Provider(s):  Consults       Date and Time Order Name Status Description    11/11/2024  1:45 PM Inpatient Pulmonology Consult Completed     11/8/2024 11:28 PM Inpatient Neurology Consult General Completed     11/8/2024 11:28 PM Inpatient Cardiology Consult Completed              Admitting Diagnosis:       Most Recent Vitals:   Vitals:    11/15/24 1101 11/15/24 1130 11/15/24 1201 11/15/24 1231   BP: 130/93 123/83 132/87 135/85   BP Location:       Patient Position:       Pulse: 66 75 71 69   Resp:       Temp:       TempSrc:       SpO2: 95% 97% 94% 96%   Weight:       Height:           Active LDAs/IV Access:   Lines, Drains & Airways       Active LDAs       Name Placement date Placement time Site Days    Peripheral IV 11/15/24 1039 Left Antecubital 11/15/24  1039  Antecubital  less than 1                    Labs (abnormal labs have a star):   Labs Reviewed   COMPREHENSIVE METABOLIC PANEL - Abnormal; Notable for the following components:       Result Value    Glucose 103 (*)     All other components within normal limits    Narrative:     GFR Normal >60  Chronic Kidney Disease <60  Kidney Failure <15     CBC WITH AUTO DIFFERENTIAL - Abnormal; Notable for the following  components:    Hemoglobin 18.5 (*)     Hematocrit 52.7 (*)     Immature Grans % 1.0 (*)     Immature Grans, Absolute 0.07 (*)     All other components within normal limits   SINGLE HS TROPONIN T - Normal    Narrative:     High Sensitive Troponin T Reference Range:  <14.0 ng/L- Negative Female for AMI  <22.0 ng/L- Negative Male for AMI  >=14 - Abnormal Female indicating possible myocardial injury.  >=22 - Abnormal Male indicating possible myocardial injury.   Clinicians would have to utilize clinical acumen, EKG, Troponin, and serial changes to determine if it is an Acute Myocardial Infarction or myocardial injury due to an underlying chronic condition.        CBC AND DIFFERENTIAL    Narrative:     The following orders were created for panel order CBC & Differential.  Procedure                               Abnormality         Status                     ---------                               -----------         ------                     CBC Auto Differential[[id number]]        Abnormal            Final result                 Please view results for these tests on the individual orders.       Meds Given in ED:   Medications   sodium chloride 0.9 % flush 10 mL (has no administration in time range)   Lido-EPINEPHrine-Tetracaine 4-0.18-0.5 % gel 3 mL (3 mL Topical Given 11/15/24 1049)   diazePAM (VALIUM) tablet 5 mg (5 mg Oral Given 11/15/24 1143)           Last NIH score:                                                          Dysphagia screening results:        Radha Coma Scale:  No data recorded     CIWA:        Restraint Type:            Isolation Status:  No active isolations

## 2024-11-15 NOTE — CONSULTS
ENT H&P    Hussain Feliz  [id number]  1975  Date of Consult 11/15/2024       Referring Provider: Dr. Gong  Chief Complaint: Syncope    History of present illness: I was asked this pleasant 49-year-old gentleman that is well-known to me for evaluation of persistence of laryngospasm at onset of coughing leading to syncopal episodes.  He had a spell recently where he fell and broke his nose.  He had sutures placed in the emergency room and still having some mild oozing from the left side of his nose.  He has broken his nose on multiple occasions and does not feel his nose looks out of position at this time.  He notes that the cough becomes uncontrollable he will spasm and then he cannot breathe then passes out.  He had a thorough workup for earlier this week in the intensive care unit and was diagnosed with vocal cord dysfunction with laryngospasm and syncopal episodes.        History  Past Medical History:   Diagnosis Date    Deviated septum 11/14/2024    Heart burn     Hiatal hernia     Laryngospasm     Paradoxical vocal cord motion disorder     Pneumonia    ,   Past Surgical History:   Procedure Laterality Date    COLONOSCOPY      HEMORRHOIDECTOMY  2013    Dr. Stahl   ,   Family History   Problem Relation Age of Onset    Hypertension Mother     Obesity Mother     Arthritis Mother    ,   Social History     Tobacco Use    Smoking status: Some Days     Current packs/day: 1.00     Types: Cigarettes    Smokeless tobacco: Never    Tobacco comments:     1 pack per week   Vaping Use    Vaping status: Never Used   Substance Use Topics    Alcohol use: Yes     Comment: maybe one drink week    Drug use: Never   ,   Medications Prior to Admission   Medication Sig Dispense Refill Last Dose/Taking    albuterol sulfate  (90 Base) MCG/ACT inhaler Inhale 2 puffs Every 4 (Four) Hours As Needed for Wheezing. (Patient taking differently: Inhale 2 puffs Every 4 (Four) Hours As Needed for Wheezing or Shortness of Air.)  "8.5 g 0 Past Week    benzonatate (TESSALON) 100 MG capsule Take 2 capsules by mouth 3 (Three) Times a Day As Needed for Cough. 30 capsule 0 11/14/2024    clotrimazole (MYCELEX) 10 MG alma delia Take 1 tablet by mouth 5 (Five) Times a Day for 35 doses. 35 tablet 0 11/14/2024    fluconazole (DIFLUCAN) 200 MG tablet Take 1 tablet by mouth Daily. Take 2 tablets (400 mg) on day one then one tablet (200 mg) on days 2 to 14. 15 tablet 0 11/14/2024    pantoprazole (PROTONIX) 40 MG EC tablet Take 1 tablet by mouth 2 (Two) Times a Day. 180 tablet 3 11/14/2024    phenol (CHLORASEPTIC) 1.4 % liquid liquid Apply 1 spray to the mouth or throat Every 2 (Two) Hours As Needed (sore throat). 177 mL 0 Past Week    promethazine-codeine (PHENERGAN with CODEINE) 6.25-10 MG/5ML syrup Take 5 mL by mouth Every 4 (Four) Hours As Needed for Cough for up to 5 days. 180 mL 0 11/14/2024    pseudoephedrine (SUDAFED) 120 MG 12 hr tablet Take 1 tablet by mouth 2 (Two) Times a Day for 5 days. 10 tablet 0 11/14/2024    and Allergies:  Patient has no known allergies.    Objective     Vital Signs   /88 (BP Location: Right arm, Patient Position: Lying)   Pulse 68   Temp 98.2 °F (36.8 °C) (Oral)   Resp 18   Ht 180.3 cm (71\")   Wt 110 kg (241 lb 11.2 oz)   SpO2 95%   BMI 33.71 kg/m²     Physical Exam:     General Appearance:    Alert, cooperative, no obvious acute displacement of the nasal bones however he does have a laceration that is healing over the dorsum   Head:  He has some bruising over the left side of his forehead   Ears:   Nose:   Ears appear intact with no abnormalities noted  Nasal mucosa appears inflamed with healing laceration of left septum with obvious defect.   Throat:   No oral lesions, no thrush, oral mucosa moist   Neck:   No adenopathy, supple, trachea midline, no thyromegaly,    Skin:   No bleeding, bruising or rash   Neurologic:   Cranial nerves 2 - 12 grossly intact, sensation intact                    Endoscopic exam: " Topical Afrin and lidocaine was applied to the nasal cavity bilaterally.  The left septum did have an area of recent laceration with displacement but no hematoma formation.  The nasopharynx appears healthy hypopharynx and larynx did not reveal any significant edema of the laryngeal surface or abnormal vocal cord mobility.  However, when he began to cough there was noted narrowing of the subglottic trachea to the point of causing severe stridor.  This did pass once he was breathing more clearly.    Results Review:   None    Results from last 7 days   Lab Units 11/15/24  1155   SODIUM mmol/L 140   POTASSIUM mmol/L 4.2   CHLORIDE mmol/L 105   CO2 mmol/L 27.0   BUN mg/dL 9   CREATININE mg/dL 1.09   CALCIUM mg/dL 8.7   BILIRUBIN mg/dL 1.1   ALK PHOS U/L 82   ALT (SGPT) U/L 38   AST (SGOT) U/L 30   GLUCOSE mg/dL 103*       Imaging:  Imaging Results (Last 72 Hours)       Procedure Component Value Units Date/Time    CT Head Without Contrast [[id number]] Collected: 11/15/24 1032     Updated: 11/15/24 1108    Narrative:      CT HEAD WO CONTRAST, CT FACIAL BONES WO CONTRAST    Date of Exam: 11/15/2024 10:21 AM EST    Indication: head injury.    Comparison: 11/8/2024 CTA head neck    Technique: Axial CT images were obtained of the head and facial bones without contrast administration.  Automated exposure control and iterative construction methods were used.      Findings:  HEAD:    Parenchyma:No acute intraparenchymal hemorrhage. No loss of gray-white differentiation to suggest large territory infarct. Normal parenchymal volume. No substantial white matter disease. No midline shift or herniation.    Ventricles and extra axial spaces:Normal caliber of ventricles and sulci. No extra axial fluid collection seen.    Other:Orbits are grossly intact. Scattered paranasal sinus mucosal thickening. Mastoid air cells are clear. Calvarium is intact. No substantial intracranial atherosclerotic calcification.    FACE:    Bones: Some  irregularity of the distal nasal bone represent nondisplaced fracture. No evidence of fracture otherwise.    Soft tissue: Mild swelling of the nose. No suspicious adenopathy. Aerodigestive tract is grossly patent. No soft tissue mass or fluid collection.      Impression:      Impression:  No acute intracranial traumatic injury.    Mild soft tissue swelling of the nose with irregularity of the distal nasal bone which may reflect nondisplaced fracture. Recommend correlation with any focal pain here.        Electronically Signed: Martin Huerta MD    11/15/2024 11:05 AM EST    Workstation ID: [id number]    CT Facial Bones Without Contrast [[id number]] Collected: 11/15/24 1032     Updated: 11/15/24 1108    Narrative:      CT HEAD WO CONTRAST, CT FACIAL BONES WO CONTRAST    Date of Exam: 11/15/2024 10:21 AM EST    Indication: head injury.    Comparison: 11/8/2024 CTA head neck    Technique: Axial CT images were obtained of the head and facial bones without contrast administration.  Automated exposure control and iterative construction methods were used.      Findings:  HEAD:    Parenchyma:No acute intraparenchymal hemorrhage. No loss of gray-white differentiation to suggest large territory infarct. Normal parenchymal volume. No substantial white matter disease. No midline shift or herniation.    Ventricles and extra axial spaces:Normal caliber of ventricles and sulci. No extra axial fluid collection seen.    Other:Orbits are grossly intact. Scattered paranasal sinus mucosal thickening. Mastoid air cells are clear. Calvarium is intact. No substantial intracranial atherosclerotic calcification.    FACE:    Bones: Some irregularity of the distal nasal bone represent nondisplaced fracture. No evidence of fracture otherwise.    Soft tissue: Mild swelling of the nose. No suspicious adenopathy. Aerodigestive tract is grossly patent. No soft tissue mass or fluid collection.      Impression:      Impression:  No acute  intracranial traumatic injury.    Mild soft tissue swelling of the nose with irregularity of the distal nasal bone which may reflect nondisplaced fracture. Recommend correlation with any focal pain here.        Electronically Signed: Martin Huerta MD    11/15/2024 11:05 AM EST    Workstation ID: [id number]                  Assessment:  Vocal cord dysfunction with laryngospasm-I suspect he may have some underlying subglottic tracheal collapse as well.  I would like to see if we can get a bronchoscopy done to evaluate this more thoroughly.      GERD (gastroesophageal reflux disease)    Syncope      Plan:  We can start him on aspiration precaution diet.  Will ask for pulmonary consultation regarding bronchoscopy.  Will start back on Tessalon Perles to reduce the hypersensitivity of his upper airway.    I discussed the patients findings and my recommendations with     Roberto Mehta MD  11/15/24  18:04 EST

## 2024-11-16 PROCEDURE — 99214 OFFICE O/P EST MOD 30 MIN: CPT | Performed by: NURSE PRACTITIONER

## 2024-11-16 PROCEDURE — 99233 SBSQ HOSP IP/OBS HIGH 50: CPT | Performed by: INTERNAL MEDICINE

## 2024-11-16 PROCEDURE — 99212 OFFICE O/P EST SF 10 MIN: CPT | Performed by: NURSE PRACTITIONER

## 2024-11-16 PROCEDURE — 97165 OT EVAL LOW COMPLEX 30 MIN: CPT

## 2024-11-16 PROCEDURE — 96361 HYDRATE IV INFUSION ADD-ON: CPT

## 2024-11-16 PROCEDURE — 94799 UNLISTED PULMONARY SVC/PX: CPT

## 2024-11-16 PROCEDURE — 99222 1ST HOSP IP/OBS MODERATE 55: CPT | Performed by: INTERNAL MEDICINE

## 2024-11-16 PROCEDURE — 25810000003 SODIUM CHLORIDE 0.9 % SOLUTION: Performed by: INTERNAL MEDICINE

## 2024-11-16 PROCEDURE — G0378 HOSPITAL OBSERVATION PER HR: HCPCS

## 2024-11-16 PROCEDURE — 94640 AIRWAY INHALATION TREATMENT: CPT

## 2024-11-16 RX ORDER — BENZONATATE 100 MG/1
200 CAPSULE ORAL 3 TIMES DAILY PRN
Status: DISCONTINUED | OUTPATIENT
Start: 2024-11-16 | End: 2024-11-17 | Stop reason: HOSPADM

## 2024-11-16 RX ORDER — PROMETHAZINE HYDROCHLORIDE AND CODEINE PHOSPHATE 6.25; 1 MG/5ML; MG/5ML
5 SYRUP ORAL EVERY 4 HOURS PRN
Status: DISCONTINUED | OUTPATIENT
Start: 2024-11-16 | End: 2024-11-17 | Stop reason: HOSPADM

## 2024-11-16 RX ORDER — PSEUDOEPHEDRINE HCL 30 MG/1
60 TABLET, FILM COATED ORAL EVERY 6 HOURS PRN
Status: DISCONTINUED | OUTPATIENT
Start: 2024-11-16 | End: 2024-11-17 | Stop reason: HOSPADM

## 2024-11-16 RX ORDER — GABAPENTIN 100 MG/1
100 CAPSULE ORAL 3 TIMES DAILY
Status: DISCONTINUED | OUTPATIENT
Start: 2024-11-16 | End: 2024-11-17 | Stop reason: HOSPADM

## 2024-11-16 RX ORDER — BUDESONIDE AND FORMOTEROL FUMARATE DIHYDRATE 160; 4.5 UG/1; UG/1
2 AEROSOL RESPIRATORY (INHALATION)
Status: DISCONTINUED | OUTPATIENT
Start: 2024-11-16 | End: 2024-11-17 | Stop reason: HOSPADM

## 2024-11-16 RX ORDER — FLUCONAZOLE 200 MG/1
200 TABLET ORAL DAILY
Status: DISCONTINUED | OUTPATIENT
Start: 2024-11-16 | End: 2024-11-17 | Stop reason: HOSPADM

## 2024-11-16 RX ORDER — IPRATROPIUM BROMIDE 42 UG/1
2 SPRAY, METERED NASAL 4 TIMES DAILY
Status: DISCONTINUED | OUTPATIENT
Start: 2024-11-16 | End: 2024-11-17 | Stop reason: HOSPADM

## 2024-11-16 RX ORDER — DIAZEPAM 5 MG/1
5 TABLET ORAL EVERY 6 HOURS SCHEDULED
Status: DISCONTINUED | OUTPATIENT
Start: 2024-11-16 | End: 2024-11-17 | Stop reason: HOSPADM

## 2024-11-16 RX ORDER — ALBUTEROL SULFATE 0.83 MG/ML
2.5 SOLUTION RESPIRATORY (INHALATION) EVERY 6 HOURS PRN
Status: DISCONTINUED | OUTPATIENT
Start: 2024-11-16 | End: 2024-11-17 | Stop reason: HOSPADM

## 2024-11-16 RX ORDER — CLOTRIMAZOLE 10 MG/1
10 LOZENGE ORAL
Status: DISCONTINUED | OUTPATIENT
Start: 2024-11-16 | End: 2024-11-17 | Stop reason: HOSPADM

## 2024-11-16 RX ADMIN — IPRATROPIUM BROMIDE 2 SPRAY: 42 SPRAY, METERED NASAL at 11:46

## 2024-11-16 RX ADMIN — PANTOPRAZOLE SODIUM 40 MG: 40 TABLET, DELAYED RELEASE ORAL at 09:58

## 2024-11-16 RX ADMIN — GABAPENTIN 100 MG: 100 CAPSULE ORAL at 21:12

## 2024-11-16 RX ADMIN — SODIUM CHLORIDE 100 ML/HR: 9 INJECTION, SOLUTION INTRAVENOUS at 01:58

## 2024-11-16 RX ADMIN — DIAZEPAM 5 MG: 5 TABLET ORAL at 17:47

## 2024-11-16 RX ADMIN — BUDESONIDE AND FORMOTEROL FUMARATE DIHYDRATE 2 PUFF: 160; 4.5 AEROSOL RESPIRATORY (INHALATION) at 20:04

## 2024-11-16 RX ADMIN — PANTOPRAZOLE SODIUM 40 MG: 40 TABLET, DELAYED RELEASE ORAL at 21:12

## 2024-11-16 RX ADMIN — BUDESONIDE AND FORMOTEROL FUMARATE DIHYDRATE 2 PUFF: 160; 4.5 AEROSOL RESPIRATORY (INHALATION) at 14:21

## 2024-11-16 RX ADMIN — CLOTRIMAZOLE 10 MG: 10 LOZENGE ORAL; TOPICAL at 15:00

## 2024-11-16 RX ADMIN — IPRATROPIUM BROMIDE 2 SPRAY: 42 SPRAY, METERED NASAL at 21:14

## 2024-11-16 RX ADMIN — HYDROCODONE BITARTRATE AND ACETAMINOPHEN 1 TABLET: 5; 325 TABLET ORAL at 00:15

## 2024-11-16 RX ADMIN — DIAZEPAM 5 MG: 5 TABLET ORAL at 23:53

## 2024-11-16 RX ADMIN — Medication 10 ML: at 21:13

## 2024-11-16 RX ADMIN — IPRATROPIUM BROMIDE 2 SPRAY: 42 SPRAY, METERED NASAL at 17:48

## 2024-11-16 RX ADMIN — FLUCONAZOLE 200 MG: 200 TABLET ORAL at 15:00

## 2024-11-16 RX ADMIN — CLOTRIMAZOLE 10 MG: 10 LOZENGE ORAL; TOPICAL at 17:47

## 2024-11-16 RX ADMIN — BENZONATATE 200 MG: 100 CAPSULE ORAL at 09:59

## 2024-11-16 RX ADMIN — HYDROCODONE BITARTRATE AND ACETAMINOPHEN 1 TABLET: 5; 325 TABLET ORAL at 21:21

## 2024-11-16 RX ADMIN — DIAZEPAM 5 MG: 5 TABLET ORAL at 09:58

## 2024-11-16 RX ADMIN — CLOTRIMAZOLE 10 MG: 10 LOZENGE ORAL; TOPICAL at 21:12

## 2024-11-16 RX ADMIN — GABAPENTIN 100 MG: 100 CAPSULE ORAL at 11:46

## 2024-11-16 RX ADMIN — GABAPENTIN 100 MG: 100 CAPSULE ORAL at 15:09

## 2024-11-16 RX ADMIN — Medication 10 ML: at 09:59

## 2024-11-16 NOTE — CONSULTS
[facility]:  Inpatient Gastroenterology Consult      Inpatient Gastroenterology Consult  Consult performed by: Matt Chandler APRN  Consult ordered by: Thad Gong MD  Reason for consult: Dysphagia, reflux, chronic cough      Referring Provider: No ref. provider found    PCP: Hilda Beckett PA    Chief Complaint: Passing out    History of present illness:  Hussain Feliz is a 49 y.o. male who is admitted following recurrent syncopal episodes following bouts of coughing.  GI consult received for ongoing issues with dysphagia and reflux and chronic cough.    Patient reports that for the past 3 weeks he has had issues with a worsening cough.  Notes that during his coughing episodes he will experience a syncopal event.  Reports having these intermittently over the past 3 weeks and he sought care at the local emergency department several times with no definitive etiology identified.  Most recently, patient had a severe episode of coughing with eventual fall during syncopal episode that resulted in a broken nose.  Has been seen by ENT, cardiology, and now pulmonology for this issue.  ENT evaluation concerning for mild erythema and edema with moderate collapse/spasm of the laryngeal walls concerning for vocal cord dysfunction.  Does report some GERD like symptoms and saw my colleague Laury Gusman recently via telemedicine for evaluation thereof.  Does not report any issues with nausea, vomiting, diarrhea, abdominal pain, shortness of breath, chest pain, or fever/chills.  Continues to have intermittent cough.  Has been on PPI.  Noted plans for outpatient bidirectional endoscopy. Past medical, surgical, social, and family histories are reviewed for accuracy.  No documented alleviating or exacerbating factors.  Does not endorse pain at time of exam.    Allergies:  Patient has no known allergies.    Scheduled Meds:  budesonide-formoterol, 2 puff, Inhalation, BID - RT  clotrimazole, 10 mg,  Oral, 5x Daily  diazePAM, 5 mg, Oral, Q6H  fluconazole, 200 mg, Oral, Daily  gabapentin, 100 mg, Oral, TID  ipratropium, 2 spray, Each Nare, 4x Daily  pantoprazole, 40 mg, Oral, BID  sodium chloride, 10 mL, Intravenous, Q12H         Infusions:       PRN Meds:    albuterol    benzonatate    senna-docusate sodium **AND** polyethylene glycol **AND** bisacodyl **AND** bisacodyl    Calcium Replacement - Follow Nurse / BPA Driven Protocol    HYDROcodone-acetaminophen    Magnesium Standard Dose Replacement - Follow Nurse / BPA Driven Protocol    ondansetron    Phosphorus Replacement - Follow Nurse / BPA Driven Protocol    Potassium Replacement - Follow Nurse / BPA Driven Protocol    promethazine-codeine    pseudoephedrine    [COMPLETED] Insert Peripheral IV **AND** sodium chloride    sodium chloride    sodium chloride    Home Meds:  Medications Prior to Admission   Medication Sig Dispense Refill Last Dose/Taking    albuterol sulfate  (90 Base) MCG/ACT inhaler Inhale 2 puffs Every 4 (Four) Hours As Needed for Wheezing. (Patient taking differently: Inhale 2 puffs Every 4 (Four) Hours As Needed for Wheezing or Shortness of Air.) 8.5 g 0 Past Week    benzonatate (TESSALON) 100 MG capsule Take 2 capsules by mouth 3 (Three) Times a Day As Needed for Cough. 30 capsule 0 11/14/2024    clotrimazole (MYCELEX) 10 MG alma delia Take 1 tablet by mouth 5 (Five) Times a Day for 35 doses. 35 tablet 0 11/14/2024    fluconazole (DIFLUCAN) 200 MG tablet Take 1 tablet by mouth Daily. Take 2 tablets (400 mg) on day one then one tablet (200 mg) on days 2 to 14. 15 tablet 0 11/14/2024    pantoprazole (PROTONIX) 40 MG EC tablet Take 1 tablet by mouth 2 (Two) Times a Day. 180 tablet 3 11/14/2024    phenol (CHLORASEPTIC) 1.4 % liquid liquid Apply 1 spray to the mouth or throat Every 2 (Two) Hours As Needed (sore throat). 177 mL 0 Past Week    promethazine-codeine (PHENERGAN with CODEINE) 6.25-10 MG/5ML syrup Take 5 mL by mouth Every 4 (Four)  Hours As Needed for Cough for up to 5 days. 180 mL 0 11/14/2024    pseudoephedrine (SUDAFED) 120 MG 12 hr tablet Take 1 tablet by mouth 2 (Two) Times a Day for 5 days. 10 tablet 0 11/14/2024       ROS: Review of Systems   Constitutional:  Positive for activity change, appetite change and fatigue. Negative for chills, diaphoresis, fever and unexpected weight change.   HENT:  Negative for sore throat, trouble swallowing and voice change.    Eyes: Negative.    Respiratory:  Positive for cough. Negative for apnea, choking, chest tightness, shortness of breath, wheezing and stridor.    Cardiovascular:  Negative for chest pain, palpitations and leg swelling.   Gastrointestinal:  Negative for abdominal distention, abdominal pain, anal bleeding, blood in stool, constipation, diarrhea, nausea, rectal pain and vomiting.   Endocrine: Negative.    Genitourinary: Negative.    Musculoskeletal: Negative.    Skin: Negative.    Allergic/Immunologic: Negative.    Neurological:  Positive for syncope, weakness and light-headedness.   Hematological: Negative.    Psychiatric/Behavioral: Negative.     All other systems reviewed and are negative.      PAST MED HX:  Past Medical History:   Diagnosis Date    Deviated septum 11/14/2024    Heart burn     Hiatal hernia     Laryngospasm     Paradoxical vocal cord motion disorder     Pneumonia        PAST SURG HX:  Past Surgical History:   Procedure Laterality Date    COLONOSCOPY      HEMORRHOIDECTOMY  2013    Dr. Stahl       [facility] HX:  Family History   Problem Relation Age of Onset    Hypertension Mother     Obesity Mother     Arthritis Mother        SOC HX:  Social History     Socioeconomic History    Marital status:     Number of children: 3   Tobacco Use    Smoking status: Some Days     Current packs/day: 1.00     Types: Cigarettes    Smokeless tobacco: Never    Tobacco comments:     1 pack per week   Vaping Use    Vaping status: Never Used   Substance and Sexual Activity    Alcohol  "use: Yes     Comment: maybe one drink week    Drug use: Never    Sexual activity: Defer       PHYSICAL EXAM  /82 (BP Location: Left arm, Patient Position: Lying)   Pulse 53   Temp 98 °F (36.7 °C) (Oral)   Resp 18   Ht 180.3 cm (71\")   Wt 110 kg (241 lb 11.2 oz)   SpO2 92%   BMI 33.71 kg/m²   Wt Readings from Last 3 Encounters:   11/15/24 110 kg (241 lb 11.2 oz)   11/08/24 112 kg (247 lb 1.6 oz)   11/04/24 111 kg (245 lb)   ,body mass index is 33.71 kg/m².  Physical Exam  Vitals and nursing note reviewed.   Constitutional:       General: He is not in acute distress.     Appearance: Normal appearance. He is normal weight. He is not ill-appearing or toxic-appearing.   HENT:      Head: Normocephalic and atraumatic.   Eyes:      General: No scleral icterus.     Extraocular Movements: Extraocular movements intact.      Conjunctiva/sclera: Conjunctivae normal.      Pupils: Pupils are equal, round, and reactive to light.   Cardiovascular:      Rate and Rhythm: Normal rate and regular rhythm.      Pulses: Normal pulses.      Heart sounds: Normal heart sounds.   Pulmonary:      Effort: Pulmonary effort is normal. No respiratory distress.      Breath sounds: Normal breath sounds.   Abdominal:      General: Abdomen is flat. Bowel sounds are normal. There is no distension.      Palpations: Abdomen is soft. There is no mass.      Tenderness: There is no abdominal tenderness. There is no guarding or rebound.      Hernia: No hernia is present.   Genitourinary:     Comments: defer  Musculoskeletal:         General: Normal range of motion.      Right lower leg: No edema.      Left lower leg: No edema.   Skin:     General: Skin is warm and dry.      Capillary Refill: Capillary refill takes less than 2 seconds.      Coloration: Skin is not jaundiced or pale.   Neurological:      General: No focal deficit present.      Mental Status: He is alert and oriented to person, place, and time.   Psychiatric:         Mood and " "Affect: Mood normal.         Behavior: Behavior normal.         Thought Content: Thought content normal.         Judgment: Judgment normal.     Results Review:  I reviewed the patient's new clinical results.  I reviewed the patient's new imaging results and agree with the interpretation.  I reviewed the patient's other test results and agree with the interpretation  I personally viewed and interpreted the patient's EKG/Telemetry data    Lab Results   Component Value Date    WBC 7.05 11/15/2024    HGB 18.5 (H) 11/15/2024    HGB 16.7 11/09/2024    HGB 18.0 (H) 11/08/2024    HCT 52.7 (H) 11/15/2024    MCV 92.3 11/15/2024     11/15/2024       No results found for: \"INR\"    Lab Results   Component Value Date    GLUCOSE 103 (H) 11/15/2024    BUN 9 11/15/2024    CREATININE 1.09 11/15/2024    BCR 8.3 11/15/2024     11/15/2024    K 4.2 11/15/2024    CO2 27.0 11/15/2024    CALCIUM 8.7 11/15/2024    PROTENTOTREF 6.7 08/29/2023    ALBUMIN 4.1 11/15/2024    ALKPHOS 82 11/15/2024    BILITOT 1.1 11/15/2024    ALT 38 11/15/2024    AST 30 11/15/2024       CT Head Without Contrast    Result Date: 11/15/2024  CT HEAD WO CONTRAST, CT FACIAL BONES WO CONTRAST Date of Exam: 11/15/2024 10:21 AM EST Indication: head injury. Comparison: 11/8/2024 CTA head neck Technique: Axial CT images were obtained of the head and facial bones without contrast administration.  Automated exposure control and iterative construction methods were used. Findings: HEAD: Parenchyma:No acute intraparenchymal hemorrhage. No loss of gray-white differentiation to suggest large territory infarct. Normal parenchymal volume. No substantial white matter disease. No midline shift or herniation. Ventricles and extra axial spaces:Normal caliber of ventricles and sulci. No extra axial fluid collection seen. Other:Orbits are grossly intact. Scattered paranasal sinus mucosal thickening. Mastoid air cells are clear. Calvarium is intact. No substantial intracranial " atherosclerotic calcification. FACE: Bones: Some irregularity of the distal nasal bone represent nondisplaced fracture. No evidence of fracture otherwise. Soft tissue: Mild swelling of the nose. No suspicious adenopathy. Aerodigestive tract is grossly patent. No soft tissue mass or fluid collection.     Impression: No acute intracranial traumatic injury. Mild soft tissue swelling of the nose with irregularity of the distal nasal bone which may reflect nondisplaced fracture. Recommend correlation with any focal pain here. Electronically Signed: Martin Huerta MD  11/15/2024 11:05 AM EST  Workstation ID: [id number]    CT Facial Bones Without Contrast    Result Date: 11/15/2024  CT HEAD WO CONTRAST, CT FACIAL BONES WO CONTRAST Date of Exam: 11/15/2024 10:21 AM EST Indication: head injury. Comparison: 11/8/2024 CTA head neck Technique: Axial CT images were obtained of the head and facial bones without contrast administration.  Automated exposure control and iterative construction methods were used. Findings: HEAD: Parenchyma:No acute intraparenchymal hemorrhage. No loss of gray-white differentiation to suggest large territory infarct. Normal parenchymal volume. No substantial white matter disease. No midline shift or herniation. Ventricles and extra axial spaces:Normal caliber of ventricles and sulci. No extra axial fluid collection seen. Other:Orbits are grossly intact. Scattered paranasal sinus mucosal thickening. Mastoid air cells are clear. Calvarium is intact. No substantial intracranial atherosclerotic calcification. FACE: Bones: Some irregularity of the distal nasal bone represent nondisplaced fracture. No evidence of fracture otherwise. Soft tissue: Mild swelling of the nose. No suspicious adenopathy. Aerodigestive tract is grossly patent. No soft tissue mass or fluid collection.     Impression: No acute intracranial traumatic injury. Mild soft tissue swelling of the nose with irregularity of the distal nasal  bone which may reflect nondisplaced fracture. Recommend correlation with any focal pain here. Electronically Signed: Martin Huerta MD  11/15/2024 11:05 AM EST  Workstation ID: [id number]    [facility] FEES - Fiberoptic Endo Eval Swallow    Result Date: 11/12/2024  This procedure was auto-finalized with no dictation required.    EEG Continuous Monitoring With Video    Result Date: 11/11/2024  Date of Procedure: 11-10 at 11 AM through 11-11 at 7:45 AM Reason for referral: 49 y.o.male with recurrent episodes of loss of consciousness, consideration of seizures, seizure classification Technical summary: A 19 channel digital EEG is performed using the international 10-20 placement system, including eye leads and EKG leads.  Video is available, and split screen technology with video/EEG correlation is used as needed.   A patient event button is offered. Review of the relevant video-EEG data was performed throughout the day.  Detailed review of the EEG and relevant video was performed using multiple montages, including a variety of referential and bipolar montages.  Results of the monitoring related to the treatment team frequently during the study, at least once a day.  Where available, seizure detection software was used for the detection of ictal and interictal discharges. Findings: At the beginning of the study, the patient is awake and resting comfortably in bed.  Diffuse low amplitude alpha activity is present symmetrically over both hemispheres.  EMG and eye blink artifact are noted anteriorly.  At times a well-regulated 9 Hz posterior rhythm is evident symmetrically over the occipital leads.  Shortly after study onset, around 11:30 in the morning, the patient has an episode of coughing.  He is observed to be sitting up in bed and there is movement artifact on the EEG.  He then by report has a brief episode of loss of consciousness or altered mental status.  During this time, no changes in the EEG are seen.  No rhythmic  or periodic discharges are noted.  No focal slowing is appreciated and no epileptiform activity is seen.  Over the course of the day, although the patient has additional coughing spells, there are no further associated episodes of altered mental status.  Overnight, sleep is seen with slowing of the background and vertex waves.  Around 3 AM, significant electrode artifact becomes apparent and the study becomes temporarily unreadable.  The head is rewrapped around 5:45 in the morning and the study quality is then again excellent.  No additional episodes are reported through the end of the study.       EEG background is normal in the awake and asleep states No focal features or epileptiform activity are seen A single episode of coughing followed by brief loss of consciousness has no EEG correlate No electrographic seizures are seen Summary: Over the entirety of this recording, no evidence for epilepsy is seen Findings communicated to the consultant neurologist This report is transcribed using the Dragon dictation system.     EEG Continuous Monitoring With Video    Result Date: 11/10/2024  Date of Procedure: 11-9 at 6:30 PM through 11-10 at 11 AM Reason for referral: 49 y.o.male with recurrent episodes of syncope, altered mental status, consideration of seizures Technical summary: A 19 channel digital EEG is performed using the international 10-20 placement system, including eye leads and EKG leads.  Video is available, and split screen technology with video/EEG correlation is used as needed.   A patient event button is offered. Review of the relevant video-EEG data was performed throughout the day.  Detailed review of the EEG and relevant video was performed using multiple montages, including a variety of referential and bipolar montages.  Results of the monitoring related to the treatment team frequently during the study, at least once a day.  Where available, seizure detection software was used for the detection of  ictal and interictal discharges. Findings: The patient is awake at the beginning of the study.  The background shows diffuse low amplitude theta and alpha activity present symmetrically over both hemispheres.  At times a 10 Hz posterior rhythm is evident over the occipital leads.  EMG and eye blink artifact are present anteriorly.  Overnight, sleep is seen with slowing of the background, and sleep spindles.  In the early morning hours, electrode artifact is present in the occipital leads and this becomes quite prominent in the last hour of the recording.  The patient's head is rewrapped shortly before 11 AM, and the study is again of good technical quality.  Over the course of the study.  No focal features or epileptiform activity are seen.  The patient event button is not pushed.  No clinical or electrographic seizures are seen.       Normal EEG in the awake and asleep states No focal features or epileptiform activity are seen No clinical or electrographic seizures are captured This report is transcribed using the Dragon dictation system.     EEG    Result Date: 11/9/2024  Reason for referral: 49 y.o.male recurrent syncope, consideration of seizures Technical Summary:  A 19 channel digital EEG was performed using the international 10-20 placement system, including eye leads and EKG leads. Duration: 20 minutes Findings: The patient is awake.  Diffuse low amplitude intermixed theta and alpha activity is present symmetrically over both hemispheres.  At times the tenderness posterior rhythm is evident.  Photic stimulation is a symmetric driving response.  Hyperventilation is not performed.  Stage II sleep is not seen.  No focal features or epileptiform activity are noted Video: Available Technical quality: Good EKG: Regular, 60 bpm SUMMARY: Normal EEG in the awake state No focal features or epileptiform activity are seen     Normal study This report is transcribed using the Dragon dictation system.      MRI Brain  Without Contrast    Result Date: 11/8/2024  MRI BRAIN WO CONTRAST Date of Exam: 11/8/2024 3:13 PM EST Indication: syncope.  Comparison: CT same day Technique:  Routine multiplanar/multisequence sequence images of the brain were obtained without contrast administration. Findings: Diffusion-weighted imaging demonstrates no acute restriction abnormality. Midline structures of the brain are grossly unremarkable in appearance. Pituitary and sella structures and craniocervical junction are grossly unremarkable in their appearance. The ventricles, cisterns and sulci appear within normal limits. No acute intracranial hemorrhage or mass effect is noted. Gray and white matter signal characteristics appear within normal limits. Major intracranial flow voids appear grossly patent. Please see CT angiogram same day for further evaluation. Mild mucosal thickening of the paranasal sinuses noted. Globes and orbits appear grossly unremarkable in their appearance. Mastoid air cells appear to be grossly clear. Cerebral pontine angle structures and inner ear structures appear grossly unremarkable in appearance.     Impression: No acute intracranial abnormality Electronically Signed: Cristian Richards MD  11/8/2024 3:51 PM EST  Workstation ID: [id number]    Treadmill Stress Test    Result Date: 11/8/2024    Pt denied chest pain/discomfort during exercise. Pt did c/o SOA (SP02 WNL on RA) that resolved in recovery.   THR of 146 achieved at 8:40.   Expected exercise duration = 10:40. Actual exercise duration = 8:50. Pt stopped d/t leg fatigue.   DTS of 15.8.   SR-ST with rare PVC in recovery.   No ST-T wave changes noted.   No ECG evidence of myocardial ischemia.   Negative clinical evidence of myocardial ischemia.   Findings consistent with a normal ECG stress test.     Adult Transthoracic Echo Complete w/ Color, Spectral and Contrast (If Necessary Per Protocol)    Result Date: 11/8/2024    Left ventricular systolic function is normal.  Calculated left ventricular 3D EF = 63% Left ventricular ejection fraction appears to be 61 - 65%.   Trace mitral valve regurgitation is present.   Normal left atrial size and volume noted.     CT Angiogram Neck    Result Date: 11/8/2024  CT ANGIOGRAM NECK, CT ANGIOGRAM HEAD Date of Exam: 11/8/2024 12:54 PM EST Indication: syncope. Comparison: CT head from earlier today Technique: CTA of the head and neck was performed before and after the uneventful intravenous administration of 75 mL Isovue-370. Reconstructed coronal and sagittal images were also obtained. In addition, a 3-D volume rendered image was created for interpretation. Automated exposure control and iterative reconstruction methods were used. Findings: There is a three-vessel aortic arch. The right common carotid artery is widely patent with mild plaque distally. The right carotid bifurcation is widely patent. The right internal carotid artery is widely patent. The left common carotid artery is widely patent. The left carotid bifurcation is widely patent. The left internal carotid artery is widely patent. The right vertebral artery is widely patent. The left vertebral artery is widely patent. The left vertebral artery is dominant. The bilateral middle cerebral, bilateral anterior cerebral, and anterior communicating arteries are widely patent. The basilar and bilateral posterior cerebral arteries are widely patent. There are patent bilateral posterior communicating arteries. No intracranial aneurysm is identified. The visualized portions of the bilateral superior cerebellar, anteroinferior cerebellar, and posterior inferior cerebellar arteries are unremarkable.     Impression: Major arterial vasculature within head and neck appears widely patent, with no hemodynamically significant stenosis, dissection, thrombus, or aneurysm. Electronically Signed: Luis Dennis MD  11/8/2024 1:29 PM EST  Workstation ID: [id number]    CT Angiogram Head    Result Date:  11/8/2024  CT ANGIOGRAM NECK, CT ANGIOGRAM HEAD Date of Exam: 11/8/2024 12:54 PM EST Indication: syncope. Comparison: CT head from earlier today Technique: CTA of the head and neck was performed before and after the uneventful intravenous administration of 75 mL Isovue-370. Reconstructed coronal and sagittal images were also obtained. In addition, a 3-D volume rendered image was created for interpretation. Automated exposure control and iterative reconstruction methods were used. Findings: There is a three-vessel aortic arch. The right common carotid artery is widely patent with mild plaque distally. The right carotid bifurcation is widely patent. The right internal carotid artery is widely patent. The left common carotid artery is widely patent. The left carotid bifurcation is widely patent. The left internal carotid artery is widely patent. The right vertebral artery is widely patent. The left vertebral artery is widely patent. The left vertebral artery is dominant. The bilateral middle cerebral, bilateral anterior cerebral, and anterior communicating arteries are widely patent. The basilar and bilateral posterior cerebral arteries are widely patent. There are patent bilateral posterior communicating arteries. No intracranial aneurysm is identified. The visualized portions of the bilateral superior cerebellar, anteroinferior cerebellar, and posterior inferior cerebellar arteries are unremarkable.     Impression: Major arterial vasculature within head and neck appears widely patent, with no hemodynamically significant stenosis, dissection, thrombus, or aneurysm. Electronically Signed: Luis Dennis MD  11/8/2024 1:29 PM EST  Workstation ID: [id number]    CT Head Without Contrast    Result Date: 11/8/2024  CT HEAD WO CONTRAST Date of Exam: 11/8/2024 12:54 PM EST Indication: syncope. Comparison: None available. Technique: Axial CT images were obtained of the head without contrast administration.  Automated exposure  control and iterative construction methods were used. Findings: No acute intracranial hemorrhage or extra-axial collection is identified. The ventricles appear normal in caliber, with no evidence of mass effect or midline shift. The basal cisterns appear patent. The gray-white differentiation appears preserved. The calvarium appear intact. The paranasal sinuses are clear. The mastoid air cells are well-aerated.     Impression: No acute intracranial process identified. Electronically Signed: Luis Dennis MD  11/8/2024 1:18 PM EST  Workstation ID: [id number]    CT Angiogram Chest    Result Date: 11/8/2024  CT ANGIOGRAM CHEST Date of Exam: 11/8/2024 11:15 AM EST Indication: syncope, concern for PE. Comparison: Chest x-ray from today Technique: CTA of the chest was performed after the uneventful intravenous administration of 85 mL Isovue-370. Reconstructed coronal and sagittal images were also obtained. In addition, a 3-D volume rendered image was created for interpretation. Automated exposure control and iterative reconstruction methods were used. FINDINGS: Thoracic inlet: Unremarkable. Pulmonary arteries: No filling defects are identified within the pulmonary arteries to suggest acute pulmonary embolism. Great vessels: The thoracic aorta and proximal arch vessels appear unremarkable. Mediastinum/Katie: No pathologically enlarged mediastinal lymph nodes are seen. The esophagus is unremarkable. Lung parenchyma: Left upper lobe granuloma. Lungs appear otherwise adequately aerated. No acute infiltrate is seen. No suspicious pulmonary nodules are seen. Trachea and airways: The trachea and central airways appear unremarkable. Pleural space: No significant pleural effusion or pneumothorax. Heart and pericardium: The heart and pericardium appear unremarkable. Chest wall: No acute or suspicious osseous or soft tissue lesion is identified. Upper abdomen: No acute abnormality is identified within the visualized upper abdomen.  There are several subcentimeter liver hypodensities which are too small to characterize.     1.No acute abnormality is identified within the thorax. Specifically, there is no evidence of acute pulmonary embolism. 2.Please see above for additional details. Electronically Signed: Cas Orozco MD  11/8/2024 11:35 AM EST  Workstation ID: [id number]    XR Chest 1 View    Result Date: 11/8/2024  XR CHEST 1 VW Date of Exam: 11/8/2024 10:54 AM EST Indication: SOA triage protocol Comparison: 11/4/2024 Findings: Low lung volumes. Heart size and pulmonary vasculature are within normal limits. Mild atelectasis and vascular crowding in the lung bases due to low lung volumes. No suspicious infiltrate or edema. Calcified granuloma in the peripheral left midlung. Costophrenic angle sharp     Impression: Hypoinspiratory film demonstrating no acute cardiopulmonary process Electronically Signed: Ranjith Garcia  11/8/2024 11:08 AM EST  Workstation ID: [id number]    XR Chest 2 View    Result Date: 11/4/2024  XR CHEST 2 VW Date of Exam: 11/4/2024 11:16 AM EST Indication: pna? Comparison: None available. Findings: Heart mediastinum and pulmonary vasculature appear within normal limits. There is generalized pattern of coarsened and indistinct pulmonary interstitial markings and peribronchial thickening, suggesting bronchial inflammation. These changes are little denser in the left midlung than elsewhere, but appear to be diffuse on both images. No lung consolidation, effusion or pneumothorax is appreciated. A few calcified granulomas are noted.     Impression: Peribronchial thickening which may be chronic, as from asthma, or acute as from bronchitis or early bronchopneumonia. Changes are little greater in the left perihilar region than elsewhere. These correlate with patient's symptoms. Electronically Signed: Jaret Farooq MD  11/4/2024 1:08 PM EST  Workstation ID: [id number]     ASSESSMENTS/PLANS  1.  Gastroesophageal reflux disease  2.  Chronic  cough  3.  Syncope  4.  Intermittent dysphagia    Hussain Feliz is a 49 y.o. male who presents to hospital after recurrent syncopal episodes following episodes of coughing.  Noted workup per ENT and pulmonology with concerns for laryngospasm and possible subglottic narrowing of likely reactive etiology.  Given patient's GERD and cough, recommend EGD for further evaluation upper GI tract.  Patient to follow-up with outpatient GI for screening colonoscopy in future.    >>> N.p.o. at midnight  >>> EGD in a.m.  >>> IV PPI twice daily  >>> GERD and reflux precautions  >>> Obtain outpatient colonoscopy and follow-up with outpatient GI    I discussed the patient's findings and my recommendations with patient, family, nursing staff, and consulting provider    Matt Chandler, IRENE  11/16/24  18:05 EST

## 2024-11-16 NOTE — DISCHARGE INSTR - DIET
After Visit Summary   6/20/2017    Grabiel Lundberg    MRN: [id number]           Patient Information     Date Of Birth          2013        Visit Information        Provider Department      6/20/2017 3:20 PM Kingsley Eldridge MD [facility]        Today's Diagnoses     Cellulitis, unspecified cellulitis site    -  1    Encounter for removal of sutures           Follow-ups after your visit        Your next 10 appointments already scheduled     Jul 11, 2017 10:20 AM CDT   [facility] with Kingsley Eldridge MD   [facility] ([facility]    [address] [place] [address] [phone] [phone]              Who to contact     If you have questions or need follow up information about today's clinic visit or your schedule please contact [facility] directly at [phone].  Normal or non-critical lab and imaging results will be communicated to you by [employer], letter or phone within 4 business days after the clinic has received the results. If you do not hear from us within 7 days, please contact the clinic through [employer] or phone. If you have a critical or abnormal lab result, we will notify you by phone as soon as possible.  Submit refill requests through [employer] or call your pharmacy and they will forward the refill request to us. Please allow 3 business days for your refill to be completed.          Additional Information About Your Visit        MyChart Information     [employer] lets you send messages to your doctor, view your test results, renew your prescriptions, schedule appointments and more. To sign up, go to [email or web address], contact your [facility] clinic or call [phone] during business hours.            Care EveryWhere ID     This is your Care EveryWhere ID. This could be used by other organizations to access your [facility] medical records  [id number]        Your Vitals Were     Pulse Temperature Height Pulse      Regular Diet.    "Oximetry BMI (Body Mass Index)       80 97.7  F (36.5  C) (Temporal) 3' 1.99\" (0.965 m) 100% 15.73 kg/m2        Blood Pressure from Last 3 Encounters:   06/09/17 91/58    Weight from Last 3 Encounters:   06/20/17 32 lb 4.8 oz (14.7 kg) (48 %)*   06/09/17 32 lb 11.2 oz (14.8 kg) (54 %)*     * Growth percentiles are based on [facility] 2-20 Years data.              Today, you had the following     No orders found for display         Today's Medication Changes          These changes are accurate as of: 6/20/17 11:59 PM.  If you have any questions, ask your nurse or doctor.               Start taking these medicines.        Dose/Directions    amoxicillin 400 MG/5ML suspension   Commonly known as:  AMOXIL   Used for:  Cellulitis, unspecified cellulitis site   Started by:  Kingsley Eldridge MD        Dose:  50 mg/kg/day   Take 4.6 mLs (368 mg) by mouth 2 times daily for 7 days   Quantity:  64.4 mL   Refills:  0            Where to get your medicines      These medications were sent to [facility] [address]   [address] [place] [address] [facility] [address]     Phone:  [phone]     amoxicillin 400 MG/5ML suspension                Primary Care Provider Office Phone # Fax #    Kingsley Eldridge [phone]       [address]   [facility] [address]        Equal Access to Services     DARIUS CHAUDHARI AH: Hadii lilliana ku hadasho Soomaali, waaxda luqadaha, qaybta kaalmada adeegyada, waxay gill hayelle adeblanche lorenzana. So [facility] [phone].    ATENCIÓN: Si habla español, tiene a sebastian disposición servicios gratuitos de asistencia lingüística. Llame al [phone].    We comply with applicable federal civil rights laws and [place] laws. We do not discriminate on the basis of race, color, national origin, age, disability sex, sexual orientation or gender identity.            Thank you!     Thank you for choosing [facility]  for your care. " Our goal is always to provide you with excellent care. Hearing back from our patients is one way we can continue to improve our services. Please take a few minutes to complete the written survey that you may receive in the mail after your visit with us. Thank you!             Your Updated Medication List - Protect others around you: Learn how to safely use, store and throw away your medicines at www.disposemymeds.org.          This list is accurate as of: 6/20/17 11:59 PM.  Always use your most recent med list.                   Brand Name Dispense Instructions for use Diagnosis    amoxicillin 400 MG/5ML suspension    AMOXIL    64.4 mL    Take 4.6 mLs (368 mg) by mouth 2 times daily for 7 days    Cellulitis, unspecified cellulitis site

## 2024-11-16 NOTE — THERAPY DISCHARGE NOTE
Acute Care - Occupational Therapy Discharge  [facility]    Patient Name: Hussain Feliz  : 1975    MRN: [id number]                              Today's Date: 2024       Admit Date: 11/15/2024    Visit Dx:     ICD-10-CM ICD-9-CM   1. Syncope and collapse  R55 780.2   2. Laryngeal spasm  J38.5 478.75   3. Injury of head, initial encounter  S09.90XA 959.01   4. Closed fracture of nasal bone, initial encounter  S02.2XXA 802.0   5. Facial laceration, initial encounter  S01.81XA 873.40     Patient Active Problem List   Diagnosis    Abnormal blood level of iron    GERD (gastroesophageal reflux disease)    Vocal cord dysfunction    GREGOR (obstructive sleep apnea)    Cigarette nicotine dependence without complication    Deviated septum    Syncope     Past Medical History:   Diagnosis Date    Deviated septum 2024    Heart burn     Hiatal hernia     Laryngospasm     Paradoxical vocal cord motion disorder     Pneumonia      Past Surgical History:   Procedure Laterality Date    COLONOSCOPY      HEMORRHOIDECTOMY      Dr. Stahl      General Information       Row Name 24 1158          OT Time and Intention    Document Type discharge evaluation/summary  -RUTH     Mode of Treatment occupational therapy  -RUTH       Row Name 24 1158          General Information    Patient Profile Reviewed yes  -RUTH     Prior Level of Function independent:;ADL's;bed mobility;transfer;all household mobility;community mobility;home management;driving;shopping;using stairs  -RUTH     Existing Precautions/Restrictions fall;other (see comments)  recent hx of syncopal episodes  -RUTH     Barriers to Rehab medically complex  -RUTH       Row Name 24 1158          Occupational Profile    Environmental Supports and Barriers (Occupational Profile) Lives with spouse in single level home; ambulates without AD at baseline  -RUTH       Row Name 24 1158          Living Environment    People in Home spouse  -RUTH       Row Name  11/16/24 1158          Home Main Entrance    Number of Stairs, Main Entrance three  -RUTH     Stair Railings, Main Entrance railing on right side (ascending)  -RUTH       Row Name 11/16/24 1158          Stairs Within Home, Primary    Number of Stairs, Within Home, Primary none  -RUTH       Row Name 11/16/24 1158          Cognition    Orientation Status (Cognition) oriented x 3  -RUTH       Row Name 11/16/24 1158          Safety Issues/Impairments Affecting Functional Mobility    Impairments Affecting Function (Mobility) endurance/activity tolerance  -               User Key  (r) = Recorded By, (t) = Taken By, (c) = Cosigned By      Initials Name Provider Type    Jo Choe OT Occupational Therapist                   Mobility/ADL's       Row Name 11/16/24 1200          Bed Mobility    Bed Mobility supine-sit;sit-supine  -RUTH     Supine-Sit [place] (Bed Mobility) modified independence  -RUTH     Sit-Supine [place] (Bed Mobility) modified independence  -RUTH     Assistive Device (Bed Mobility) head of bed elevated;bed rails  -RUTH     Comment, (Bed Mobility) completed without difficulty  -       Row Name 11/16/24 1200          Transfers    Transfers sit-stand transfer  -RUTH     Comment, (Transfers) Pt with no reports of dizziness or lightheadedness with positional changes  -       Row Name 11/16/24 1200          Sit-Stand Transfer    Sit-Stand [place] (Transfers) standby assist  -RUTH     Comment, (Sit-Stand Transfer) no AD  -       Row Name 11/16/24 1200          Functional Mobility    Functional Mobility- Ind. Level supervision required  -     Functional Mobility-Distance (Feet) 24  -RUTH     Functional Mobility- Comment Pt ambulated to/from bathroom with no AD  -RUTH       Row Name 11/16/24 1200          Activities of Daily Living    BADL Assessment/Intervention lower body dressing;grooming  -       Row Name 11/16/24 1200          Lower Body Dressing Assessment/Training    [place] Level (Lower Body  Dressing) doff;don;socks;modified independence  -RUTH     Position (Lower Body Dressing) edge of bed sitting  -RUTH       Row Name 11/16/24 1200          Grooming Assessment/Training    [place] Level (Grooming) oral care regimen;wash face, hands;standby assist  -RUTH     Position (Grooming) sink side;unsupported standing  -RUTH               User Key  (r) = Recorded By, (t) = Taken By, (c) = Cosigned By      Initials Name Provider Type    Jo Choe OT Occupational Therapist                   Obj/Interventions       Row Name 11/16/24 1203          Sensory Assessment (Somatosensory)    Sensory Assessment (Somatosensory) UE sensation intact  -       Row Name 11/16/24 1203          Vision Assessment/Intervention    Visual Impairment/Limitations WFL  -RUTH       Row Name 11/16/24 1203          Range of Motion Comprehensive    General Range of Motion bilateral upper extremity ROM WFL  -[facility] Name 11/16/24 1203          Strength Comprehensive (MMT)    General Manual Muscle Testing (MMT) Assessment no strength deficits identified  -RUTH     Comment, General Manual Muscle Testing (MMT) Assessment BUE's  -RUTH       Row Name 11/16/24 1203          Balance    Balance Assessment sitting static balance;sitting dynamic balance;standing static balance;standing dynamic balance  -RUTH     Static Sitting Balance independent  -RUTH     Dynamic Sitting Balance standby assist  -RUTH     Position, Sitting Balance unsupported;sitting edge of bed  -RUTH     Static Standing Balance standby assist  -RUTH     Dynamic Standing Balance standby assist  -RUTH     Position/Device Used, Standing Balance unsupported  -RUTH     Balance Interventions standing;occupation based/functional task  -RUTH     Comment, Balance No unsteadiness or LOB noted during ADL's  -RUTH               User Key  (r) = Recorded By, (t) = Taken By, (c) = Cosigned By      Initials Name Provider Type    Jo Choe OT Occupational Therapist                   Goals/Plan    No  documentation.                  Clinical Impression       [facility] Name 11/16/24 1204          Pain Assessment    Pretreatment Pain Rating 0/10 - no pain  -     Posttreatment Pain Rating 0/10 - no pain  -       Row Name 11/16/24 1204          Plan of Care Review    Plan of Care Reviewed With patient;spouse;family  -     Outcome Evaluation OT eval completed. Pt presents near baseline for ADL performance with no needs currently identified warranting skilled OT intervention. Pt SBA to ambulate to bathroom, completed grooming tasks standing sink side with SBA/no AD, no c/o dizziness or lightheadedness reported with positional changes. OT signing off.  -       Row Name 11/16/24 1204          Therapy Assessment/Plan (OT)    Criteria for Skilled Therapeutic Interventions Met (OT) no problems identified which require skilled intervention;does not meet criteria for skilled intervention  -     Therapy Frequency (OT) evaluation only  -[facility] Name 11/16/24 1204          Therapy Plan Review/Discharge Plan (OT)    Anticipated Discharge Disposition (OT) home with assist  -[facility] Name 11/16/24 1204          Vital Signs    Pre Systolic BP Rehab 122  -RUTH     Pre Treatment Diastolic BP 75  -RUTH     Post Systolic BP Rehab 130  -RUTH     Post Treatment Diastolic BP 86  -RUTH     Pretreatment Heart Rate (beats/min) 70  -RUTH     Intratreatment Heart Rate (beats/min) 77  -RUTH     O2 Delivery Pre Treatment room air  -RUTH     O2 Delivery Intra Treatment room air  -RUTH     O2 Delivery Post Treatment room air  -RUTH     Pre Patient Position Supine  -RUTH     Intra Patient Position Standing  -RUTH     Post Patient Position Supine  -RUTH       Row Name 11/16/24 1204          Positioning and Restraints    Pre-Treatment Position in bed  -RUTH     Post Treatment Position bed  -RUTH     In Bed notified nsg;fowlers;call light within reach;encouraged to call for assist;with family/caregiver  Bed alarm unchanged  -RUTH               User Key  (r) = Recorded  By, (t) = Taken By, (c) = Cosigned By      Initials Name Provider Type    oJ Choe OT Occupational Therapist                   Outcome Measures       Row Name 11/16/24 1210          How much help from another is currently needed...    Putting on and taking off regular lower body clothing? 4  -RUTH     Bathing (including washing, rinsing, and drying) 4  -RUTH     Toileting (which includes using toilet bed pan or urinal) 4  -RUTH     Putting on and taking off regular upper body clothing 4  -RUTH     Taking care of personal grooming (such as brushing teeth) 4  -RUTH     Eating meals 4  -RUTH     AM-PAC 6 Clicks Score (OT) 24  -RUTH       Row Name 11/16/24 0800          How much help from another person do you currently need...    Turning from your back to your side while in flat bed without using bedrails? 4  -TI     Moving from lying on back to sitting on the side of a flat bed without bedrails? 4  -TI     Moving to and from a bed to a chair (including a wheelchair)? 4  -TI     Standing up from a chair using your arms (e.g., wheelchair, bedside chair)? 4  -TI     Climbing 3-5 steps with a railing? 4  -TI     To walk in hospital room? 4  -TI     AM-PAC 6 Clicks Score (PT) 24  -TI       Row Name 11/16/24 1210          Functional Assessment    Outcome Measure Options AM-PAC 6 Clicks Daily Activity (OT)  -RUTH               User Key  (r) = Recorded By, (t) = Taken By, (c) = Cosigned By      Initials Name Provider Type    Jo Choe OT Occupational Therapist    Alissa Back RN Registered Nurse                  Occupational Therapy Education       Title: PT OT SLP Therapies (In Progress)       Topic: Occupational Therapy (In Progress)       Point: ADL training (Done)       Description:   Instruct learner(s) on proper safety adaptation and remediation techniques during self care or transfers.   Instruct in proper use of assistive devices.                  Learning Progress Summary            Patient BECKY Jin VU  by RUTH at 11/16/2024 1210    Comment: Reason for OT consult   Family Acceptance, E, VU by RUTH at 11/16/2024 1210    Comment: Reason for OT consult                      Point: Home exercise program (Not Started)       Description:   Instruct learner(s) on appropriate technique for monitoring, assisting and/or progressing therapeutic exercises/activities.                  Learner Progress:  Not documented in this visit.              Point: Precautions (Done)       Description:   Instruct learner(s) on prescribed precautions during self-care and functional transfers.                  Learning Progress Summary            Patient Acceptance, E, VU by RUTH at 11/16/2024 1210    Comment: Reason for OT consult   Family Acceptance, E, VU by RUTH at 11/16/2024 1210    Comment: Reason for OT consult                      Point: Body mechanics (Not Started)       Description:   Instruct learner(s) on proper positioning and spine alignment during self-care, functional mobility activities and/or exercises.                  Learner Progress:  Not documented in this visit.                              User Key       Initials Effective Dates Name Provider Type Discipline     06/16/21 -  Jo Darden OT Occupational Therapist OT                  OT Recommendation and Plan  Therapy Frequency (OT): evaluation only  Plan of Care Review  Plan of Care Reviewed With: patient, spouse, family  Outcome Evaluation: OT eval completed. Pt presents near baseline for ADL performance with no needs currently identified warranting skilled OT intervention. Pt SBA to ambulate to bathroom, completed grooming tasks standing sink side with SBA/no AD, no c/o dizziness or lightheadedness reported with positional changes. OT signing off.  Plan of Care Reviewed With: patient, spouse, family  Outcome Evaluation: OT eval completed. Pt presents near baseline for ADL performance with no needs currently identified warranting skilled OT intervention. Pt SBA to  ambulate to bathroom, completed grooming tasks standing sink side with SBA/no AD, no c/o dizziness or lightheadedness reported with positional changes. OT signing off.     Time Calculation:   Evaluation Complexity (OT)  Review Occupational Profile/Medical/Therapy History Complexity: expanded/moderate complexity  Assessment, Occupational Performance/Identification of Deficit Complexity: 1-3 performance deficits  Clinical Decision Making Complexity (OT): problem focused assessment/low complexity  Overall Complexity of Evaluation (OT): low complexity     Time Calculation- OT       Row Name 11/16/24 1010             Time Calculation- OT    OT Start Time 1010  -RUTH      OT Received On 11/16/24  -RUTH      OT Goal Re-Cert Due Date 11/26/24  -RUTH         Untimed Charges    OT Eval/Re-eval Minutes 35  -RUTH         Total Minutes    Untimed Charges Total Minutes 35  -RUTH       Total Minutes 35  -RUTH                User Key  (r) = Recorded By, (t) = Taken By, (c) = Cosigned By      Initials Name Provider Type    Jo Choe OT Occupational Therapist                  Therapy Charges for Today       Code Description Service Date Service Provider Modifiers Qty    [id number]  OT EVAL LOW COMPLEXITY 3 11/16/2024 Jo Darden OT GO 1               OT Discharge Summary  Anticipated Discharge Disposition (OT): home with assist    Jo Darden OT  11/16/2024

## 2024-11-16 NOTE — CONSULTS
Pulmonary New Patient Evaluation     REFERRING PHYSICIAN:      Subjective       Chief Complaint   Patient presents with    Syncope            SUBJECTIVE     Mr. Feliz is a 48yo M with a history of GERD who was recently admitted to [facility] earlier this week for intermittent episodes of difficulty breathing associated with syncopal events. He was felt to have GERD and vocal cord dysfunction and was discharged with recommendations for reflux precautions and an outpatient sleep study.     He returned to [facility] on 11/15 after multiple syncopal episodes resulting in trauma, bruising and a nasal fracture.     He was admitted to Hospital Medicine.     Cardiology as evaluated him and he is currently wearing a holter monitor. No episodes since admission and telemetry is unremarkable.     He was evaluated again by ENT who felt that he may be have some subglottic tracheal narrowing with coughing and recommended repeat Pulmonary Evaluation.     He does have chronic nasal drainage.       PMH: He  has a past medical history of Deviated septum (11/14/2024), Heart burn, Hiatal hernia, Laryngospasm, Paradoxical vocal cord motion disorder, and Pneumonia.   PSxH: He  has a past surgical history that includes Colonoscopy and Hemorrhoid surgery (2013).      Medications:     Current Facility-Administered Medications:     albuterol (PROVENTIL) nebulizer solution 0.083% 2.5 mg/3mL, 2.5 mg, Nebulization, Q6H PRN, Natalie Comer MD    benzonatate (TESSALON) capsule 200 mg, 200 mg, Oral, TID PRN, Natalie Comer MD    sennosides-docusate (PERICOLACE) 8.6-50 MG per tablet 2 tablet, 2 tablet, Oral, BID PRN **AND** polyethylene glycol (MIRALAX) packet 17 g, 17 g, Oral, Daily PRN **AND** bisacodyl (DULCOLAX) EC tablet 5 mg, 5 mg, Oral, Daily PRN **AND** bisacodyl (DULCOLAX) suppository 10 mg, 10 mg, Rectal, Daily PRN, Thad Gong MD    budesonide-formoterol (SYMBICORT) 160-4.5 MCG/ACT inhaler 2 puff, 2 puff, Inhalation, BID - RT,  Case, Kaity V., DO    Calcium Replacement - Follow Nurse / BPA Driven Protocol, , Not Applicable, PRNFarideh Wycliffe, MD    clotrimazole (MYCELEX) alma delia 10 mg, 10 mg, Oral, 5x Daily, Natalie Comer MD    diazePAM (VALIUM) tablet 5 mg, 5 mg, Oral, Q6H, Natalie Comer MD    fluconazole (DIFLUCAN) tablet 200 mg, 200 mg, Oral, Daily, Natalie Comer MD    gabapentin (NEURONTIN) capsule 100 mg, 100 mg, Oral, TID, Case, Kaity V., DO, 100 mg at 11/16/24 1146    HYDROcodone-acetaminophen (NORCO) 5-325 MG per tablet 1 tablet, 1 tablet, Oral, Q6H PRN, Thad Gong MD, 1 tablet at 11/16/24 0015    ipratropium (ATROVENT) nasal spray 0.06%, 2 spray, Each Nare, 4x Daily, Case, Kaity V., DO, 2 spray at 11/16/24 1146    Magnesium Standard Dose Replacement - Follow Nurse / BPA Driven Protocol, , Not Applicable, PRN, Thad Gong MD    ondansetron (ZOFRAN) injection 4 mg, 4 mg, Intravenous, Q6H PRN, Thad Gong MD    pantoprazole (PROTONIX) EC tablet 40 mg, 40 mg, Oral, BID, Thad Gong MD, 40 mg at 11/16/24 0958    Phosphorus Replacement - Follow Nurse / BPA Driven Protocol, , Not Applicable, PRFarideh CALDWELL Wycliffe, MD    Potassium Replacement - Follow Nurse / BPA Driven Protocol, , Not Applicable, Farideh MCFADDEN Wycliffe, MD    promethazine-codeine (PHENERGAN with CODEINE) 6.25-10 MG/5ML syrup 5 mL, 5 mL, Oral, Q4H PRN, Natalie Comer MD    pseudoephedrine (SUDAFED) tablet 60 mg, 60 mg, Oral, Q6H PRN, Natalie Comer MD    [COMPLETED] Insert Peripheral IV, , , Once **AND** sodium chloride 0.9 % flush 10 mL, 10 mL, Intravenous, PRN, Maddie Diaz, APRPAULETTE    sodium chloride 0.9 % flush 10 mL, 10 mL, Intravenous, Q12H, Thad Gnog MD, 10 mL at 11/16/24 0959    sodium chloride 0.9 % flush 10 mL, 10 mL, Intravenous, PRN, Thad Gong MD    sodium chloride 0.9 % infusion 40 mL, 40 mL, Intravenous, Farideh MCFADDEN Wycliffe, MD    Allergies: He has No Known Allergies.   FH: His  family history includes Arthritis in his mother; Hypertension in his mother; Obesity in his mother.   SH: He  reports that he has been smoking cigarettes. He has never used smokeless tobacco. He reports current alcohol use. He reports that he does not use drugs.     The patient's relevant past medical, surgical and social history were reviewed and updated in Epic as appropriate.    ROS (14):   Review of Systems   All other systems reviewed and are negative.          Objective   OBJECTIVE     Physical Examination   Vitals:    11/16/24 0014 11/16/24 0335 11/16/24 0700 11/16/24 1048   BP: 136/78 100/58 122/75 130/86   BP Location:  Right arm Left arm Left arm   Patient Position:  Lying Lying Lying   Pulse: 69 53     Resp:  18 18 18   Temp:  98.3 °F (36.8 °C) 98 °F (36.7 °C) 98.2 °F (36.8 °C)   TempSrc:  Oral Oral Oral   SpO2: 94% 92%     Weight:       Height:           Body mass index is 33.71 kg/m².    Physical Examination    Constitutional:  No acute distress.  Sitting up in bed on room air.   Sutures on bridge of nose.    Neck:  Normal range of motion. Neck supple.   No JVD present. No tracheal deviation present.  No thyromegaly present.    Cardiovascular: Normal rate, regular and rhythm. Normal heart sounds.  No murmurs, gallop or rub.  No peripheral edema.   Respiratory: No respiratory distress. Normal respiratory effort.  Normal breath sounds  Clear to ascultation   Abdominal:  Soft. No masses. Nontender. No distension. No HSM.   Extremities: No digital cyanosis. No clubbing.   Lymphadenopathy: None.   Skin: Skin is warm and dry. No rashes, lesions or ulcers noted.    Neurological:   Alert and Oriented to person, place, and time.    Psychiatric:  Normal affect. Normal behavior.     Diagnostic Data    Imaging Results (Last 24 Hours)       ** No results found for the last 24 hours. **             Results for orders placed during the hospital encounter of 11/08/24    Adult Transthoracic Echo Complete w/ Color,  Spectral and Contrast (If Necessary Per Protocol)    Interpretation Summary    Left ventricular systolic function is normal. Calculated left ventricular 3D EF = 63% Left ventricular ejection fraction appears to be 61 - 65%.    Trace mitral valve regurgitation is present.    Normal left atrial size and volume noted.         Assessment & Plan   ASSESSMENT / PLAN     Assessment:  Mr. Feliz is a 48yo M with a history of GERD who was recently admitted to [facility] earlier this week for intermittent episodes of difficulty breathing associated with syncopal events. He was felt to have GERD and vocal cord dysfunction and was discharged with recommendations for reflux precautions and an outpatient sleep study.     He returned to [facility] on 11/15 after multiple syncopal episodes resulting in trauma, bruising and a nasal fracture.     He was admitted to Hospital Medicine.     Cardiology as evaluated him and he is currently wearing a holter monitor. No episodes since admission and telemetry is unremarkable.     He was evaluated again by ENT who felt that he may be have some subglottic tracheal narrowing with coughing and recommended repeat Pulmonary Evaluation.     He does have chronic nasal drainage.     Active Hospital Problems    Diagnosis     Syncope     GERD (gastroesophageal reflux disease)         Plan:  We discussed the 3 most common causes of cough which include asthma/COPD, upper airway cough syndrome and GERD. No PFTs on file and he will need to have these performed as an outpatient.   I will go ahead and give him a trial of Symbicort. Even if he does not have asthma, he may have some benefit from the inhaled steroid in terms of cough.   Start Atrovent nasal up to 4x daily for continued nasal drainage which may be contributing.   We discussed reflux precautions again. Continue PPI.   Start low-dose Gabapentin.   Continue Speech Therapy exercises for vocal cord dysfunction.   Cardiology will follow up holter monitor after it  is mailed in.   He will still need an outpatient home sleep study to evaluate for sleep apnea.   Discussed that he is not likely to have benefit from bronchoscopy as an inpatient and would recommend aggressive medical management of his cough first.   I will also plan to refer him to Dr. Tomlin who is an ENT at  who specializes in chronic cough and upper tracheal disorders. If he does have some element of subglottic tracheal stenosis, he would also be able to treat this. I will work on having or office send the referral on Monday.   He will need to follow up in Pulmonary Clinic in a couple of weeks with Full PFTs.     I discussed the diagnosis, evaluation, and  treatment options with the patient and/or appropriate family members.    Thank you very much for allowing me to participate in the care of your patient.      Kaity Espinoza, DO  Pulmonary and Critical Care Medicine

## 2024-11-16 NOTE — PROGRESS NOTES
Hussain Feliz  [id number]  1975   LOS: 0 days   Patient Care Team:  Hilda Beckett PA as PCP - General (Physician Assistant)  Kareen Singh, TOÑA as Ambulatory  ([facility] [place])    Chief Complaint: Follow-up on syncope    Subjective   Patient says he had another syncopal episode after a coughing spell around 5:00 yesterday afternoon.  He denies any chest pain or dizziness prior to the episodes.  He says that these episodes only happen after he starts choking and then he feels like he cannot breathe and then passes out.    Objective     Vital Sign Min/Max for last 24 hours  Temp  Min: 98 °F (36.7 °C)  Max: 98.3 °F (36.8 °C)   BP  Min: 91/64  Max: 136/78   Pulse  Min: 53  Max: 101   Resp  Min: 14  Max: 18   SpO2  Min: 92 %  Max: 97 %   No data recorded   Weight  Min: 109 kg (240 lb)  Max: 110 kg (241 lb 11.2 oz)         11/15/24  0927 11/15/24  1353   Weight: 109 kg (240 lb) 110 kg (241 lb 11.2 oz)         Intake/Output Summary (Last 24 hours) at 11/16/2024 0724  Last data filed at 11/16/2024 0414  Gross per 24 hour   Intake 1250 ml   Output --   Net 1250 ml       Physical Exam:     General Appearance:    Alert, cooperative, in no acute distress, stitches on nose   Lungs:     Clear to auscultation,respirations regular, even and                unlabored    Heart:    Regular and normal rate, normal S1 and S2, no            murmur, no gallop, no rub, no click   Abdomen:  Extremities:   Soft, nontender, bowel sounds audible x4    No edema, normal range of motion   Pulses:   Pulses palpable and equal bilaterally      Results Review:   Results from last 7 days   Lab Units 11/15/24  1155   SODIUM mmol/L 140   POTASSIUM mmol/L 4.2   CHLORIDE mmol/L 105   CO2 mmol/L 27.0   BUN mg/dL 9   CREATININE mg/dL 1.09   GLUCOSE mg/dL 103*   CALCIUM mg/dL 8.7     Results from last 7 days   Lab Units 11/15/24  1038   WBC 10*3/mm3 7.05   HEMOGLOBIN g/dL 18.5*   HEMATOCRIT % 52.7*   PLATELETS 10*3/mm3 226              Results from last 7 days   Lab Units 11/15/24  1155   HSTROP T ng/L 9   No new chest x-ray to review    ECG 11/15/2024:  Normal sinus rhythm  Normal ECG  When compared with ECG of 09-Nov-2024 07:06,  No significant change was found  Confirmed by KIA SAUER MD ([id number]) on 11/15/2024 3:33:05 PM    Echocardiogram 1/8/2024:    Left ventricular systolic function is normal. Calculated left ventricular 3D EF = 63% Left ventricular ejection fraction appears to be 61 - 65%.    Trace mitral valve regurgitation is present.    Normal left atrial size and volume noted.     Exercise stress test 11/8/2024:  Pt denied chest pain/discomfort during exercise. Pt did c/o SOA (SP02 WNL on RA) that resolved in recovery.    THR of 146 achieved at 8:40.    Expected exercise duration = 10:40. Actual exercise duration = 8:50. Pt stopped d/t leg fatigue.    DTS of 15.8.    SR-ST with rare PVC in recovery.    No ST-T wave changes noted.    No ECG evidence of myocardial ischemia.    Negative clinical evidence of myocardial ischemia.    Findings consistent with a normal ECG stress test.      Medication Review: Reviewed    Assessment & Plan   Patient's syncope not found to be of cardiac etiology at this point.  Patient currently wearing Holter monitor and when done wearing this will mail it back; he has had several syncopal episodes while wearing the monitor.  Normal echo, stress test negative for ischemia.  Patient has persistence of laryngospasm at onset of coughing leading to the syncopal episodes.  Awaiting pulmonary consultation regarding bronchoscopy.  From a cardiology standpoint we will see again on Monday.  Would defer tilt table test.      GERD (gastroesophageal reflux disease)    Syncope    Electronically signed by IRENE Zepeda, 11/16/24, 7:34 AM EST.     11/16/24  07:24 EST

## 2024-11-16 NOTE — PLAN OF CARE
Problem: Adult Inpatient Plan of Care  Goal: Plan of Care Review  Outcome: Progressing  Flowsheets (Taken 11/16/2024 0514)  Progress: no change  Plan of Care Reviewed With: patient  Goal: Patient-Specific Goal (Individualized)  Outcome: Progressing  Goal: Absence of Hospital-Acquired Illness or Injury  Outcome: Progressing  Intervention: Identify and Manage Fall Risk  Recent Flowsheet Documentation  Taken 11/16/2024 0400 by Isaiah Nielson RN  Safety Promotion/Fall Prevention:   activity supervised   clutter free environment maintained   fall prevention program maintained   nonskid shoes/slippers when out of bed   safety round/check completed  Taken 11/16/2024 0243 by Isaiah Nielson RN  Safety Promotion/Fall Prevention:   activity supervised   clutter free environment maintained   fall prevention program maintained   nonskid shoes/slippers when out of bed   safety round/check completed  Taken 11/16/2024 0015 by Isaiah Nielson RN  Safety Promotion/Fall Prevention:   activity supervised   clutter free environment maintained   fall prevention program maintained   nonskid shoes/slippers when out of bed   safety round/check completed  Taken 11/15/2024 2210 by Isaiah Nielson RN  Safety Promotion/Fall Prevention:   activity supervised   clutter free environment maintained   fall prevention program maintained   nonskid shoes/slippers when out of bed   safety round/check completed  Taken 11/15/2024 2000 by Isaiah Nielson RN  Safety Promotion/Fall Prevention:   activity supervised   clutter free environment maintained   fall prevention program maintained   nonskid shoes/slippers when out of bed   safety round/check completed  Intervention: Prevent Skin Injury  Recent Flowsheet Documentation  Taken 11/16/2024 0400 by Isaiah Nielson RN  Body Position: position changed independently  Skin Protection:   incontinence pads utilized   silicone foam dressing in place  Taken 11/16/2024 0243 by Isaiah Nielson RN  Body Position:  position changed independently  Skin Protection:   incontinence pads utilized   silicone foam dressing in place  Taken 11/16/2024 0015 by Isaiah Nielson RN  Body Position: position changed independently  Skin Protection:   incontinence pads utilized   silicone foam dressing in place  Taken 11/15/2024 2210 by Isaiah Nielson RN  Body Position: position changed independently  Skin Protection:   incontinence pads utilized   silicone foam dressing in place  Taken 11/15/2024 2000 by Isaiah Nielson RN  Body Position: position changed independently  Skin Protection:   incontinence pads utilized   silicone foam dressing in place  Intervention: Prevent and Manage VTE (Venous Thromboembolism) Risk  Recent Flowsheet Documentation  Taken 11/16/2024 0400 by Isaiah Nielson RN  VTE Prevention/Management: patient refused intervention  Taken 11/16/2024 0015 by Isaiah Nielson RN  VTE Prevention/Management: patient refused intervention  Taken 11/15/2024 2000 by Isaiah Nielson RN  VTE Prevention/Management: SCDs (sequential compression devices) off  Intervention: Prevent Infection  Recent Flowsheet Documentation  Taken 11/16/2024 0400 by Isaiah Nielson RN  Infection Prevention:   hand hygiene promoted   rest/sleep promoted  Taken 11/16/2024 0243 by Isaiah Nielson RN  Infection Prevention:   hand hygiene promoted   rest/sleep promoted  Taken 11/16/2024 0015 by Isaiah Nielson RN  Infection Prevention:   hand hygiene promoted   rest/sleep promoted  Taken 11/15/2024 2210 by Isaiah Nielson RN  Infection Prevention:   hand hygiene promoted   rest/sleep promoted  Taken 11/15/2024 2000 by Isaiah Nielson RN  Infection Prevention:   hand hygiene promoted   rest/sleep promoted  Goal: Optimal Comfort and Wellbeing  Outcome: Progressing  Intervention: Provide Person-Centered Care  Recent Flowsheet Documentation  Taken 11/15/2024 2000 by Isaiah Nielson RN  Trust Relationship/Rapport:   care explained   questions answered  Goal: Readiness for  Transition of Care  Outcome: Progressing   Goal Outcome Evaluation:  Plan of Care Reviewed With: patient        Progress: no change

## 2024-11-16 NOTE — PROGRESS NOTES
[facility] Medicine Services  PROGRESS NOTE    Patient Name: Hussain Feliz  : 1975  MRN: [id number]    Date of Admission: 11/15/2024  Primary Care Physician: Hilda Beckett PA    Subjective   Subjective     CC:  syncope    HPI:  Patient had another syncopal episode yesterday around five pm and then again today around noon.        Objective   Objective     Vital Signs:   Temp:  [98.1 °F (36.7 °C)-98.3 °F (36.8 °C)] 98.3 °F (36.8 °C)  Heart Rate:  [] 53  Resp:  [14-18] 18  BP: ()/(58-93) 100/58     Physical Exam:  Constitutional: No acute distress, awake, alert  HENT: NCAT, mucous membranes moist  Respiratory: Clear to auscultation bilaterally, respiratory effort normal   Cardiovascular: RRR, no murmurs, rubs, or gallops  Gastrointestinal: Positive bowel sounds, soft, nontender, nondistended  Musculoskeletal: No bilateral ankle edema  Psychiatric: Appropriate affect, cooperative  Neurologic: Oriented x 3, strength symmetric in all extremities, Cranial Nerves grossly intact to confrontation, speech clear  Skin: No rashes     Results Reviewed:  LAB RESULTS:      Lab 11/15/24  1155 11/15/24  1038 24  0505   WBC  --  7.05 7.25   HEMOGLOBIN  --  18.5* 16.7   HEMATOCRIT  --  52.7* 47.4   PLATELETS  --  226 232   NEUTROS ABS  --  4.71  --    IMMATURE GRANS (ABS)  --  0.07*  --    LYMPHS ABS  --  1.61  --    MONOS ABS  --  0.45  --    EOS ABS  --  0.18  --    MCV  --  92.3 92.0   HSTROP T 9  --  10         Lab 11/15/24  1155 24  0505   SODIUM 140 143   POTASSIUM 4.2 3.8   CHLORIDE 105 110*   CO2 27.0 22.0   ANION GAP 8.0 11.0   BUN 9 13   CREATININE 1.09 0.97   EGFR 83.7 96.3   GLUCOSE 103* 106*   CALCIUM 8.7 8.3*         Lab 11/15/24  1155 24  0505   TOTAL PROTEIN 6.8 6.2   ALBUMIN 4.1 3.8   GLOBULIN 2.7 2.4   ALT (SGPT) 38 38   AST (SGOT) 30 26   BILIRUBIN 1.1 0.7   ALK PHOS 82 75         Lab 11/15/24  1155 24  0505   HSTROP T 9 10             Lab  11/09/24  0505   VITAMIN B 12 582         Brief Urine Lab Results  (Last result in the past 365 days)        Color   Clarity   Blood   Leuk Est   Nitrite   Protein   CREAT   Urine HCG        11/08/24 1555 Yellow   Clear   Negative   Negative   Negative   Negative                   Microbiology Results Abnormal       None            CT Head Without Contrast    Result Date: 11/15/2024  CT HEAD WO CONTRAST, CT FACIAL BONES WO CONTRAST Date of Exam: 11/15/2024 10:21 AM EST Indication: head injury. Comparison: 11/8/2024 CTA head neck Technique: Axial CT images were obtained of the head and facial bones without contrast administration.  Automated exposure control and iterative construction methods were used. Findings: HEAD: Parenchyma:No acute intraparenchymal hemorrhage. No loss of gray-white differentiation to suggest large territory infarct. Normal parenchymal volume. No substantial white matter disease. No midline shift or herniation. Ventricles and extra axial spaces:Normal caliber of ventricles and sulci. No extra axial fluid collection seen. Other:Orbits are grossly intact. Scattered paranasal sinus mucosal thickening. Mastoid air cells are clear. Calvarium is intact. No substantial intracranial atherosclerotic calcification. FACE: Bones: Some irregularity of the distal nasal bone represent nondisplaced fracture. No evidence of fracture otherwise. Soft tissue: Mild swelling of the nose. No suspicious adenopathy. Aerodigestive tract is grossly patent. No soft tissue mass or fluid collection.     Impression: Impression: No acute intracranial traumatic injury. Mild soft tissue swelling of the nose with irregularity of the distal nasal bone which may reflect nondisplaced fracture. Recommend correlation with any focal pain here. Electronically Signed: Martin Huerta MD  11/15/2024 11:05 AM EST  Workstation ID: [id number]    CT Facial Bones Without Contrast    Result Date: 11/15/2024  CT HEAD WO CONTRAST, CT FACIAL  BONES WO CONTRAST Date of Exam: 11/15/2024 10:21 AM EST Indication: head injury. Comparison: 11/8/2024 CTA head neck Technique: Axial CT images were obtained of the head and facial bones without contrast administration.  Automated exposure control and iterative construction methods were used. Findings: HEAD: Parenchyma:No acute intraparenchymal hemorrhage. No loss of gray-white differentiation to suggest large territory infarct. Normal parenchymal volume. No substantial white matter disease. No midline shift or herniation. Ventricles and extra axial spaces:Normal caliber of ventricles and sulci. No extra axial fluid collection seen. Other:Orbits are grossly intact. Scattered paranasal sinus mucosal thickening. Mastoid air cells are clear. Calvarium is intact. No substantial intracranial atherosclerotic calcification. FACE: Bones: Some irregularity of the distal nasal bone represent nondisplaced fracture. No evidence of fracture otherwise. Soft tissue: Mild swelling of the nose. No suspicious adenopathy. Aerodigestive tract is grossly patent. No soft tissue mass or fluid collection.     Impression: Impression: No acute intracranial traumatic injury. Mild soft tissue swelling of the nose with irregularity of the distal nasal bone which may reflect nondisplaced fracture. Recommend correlation with any focal pain here. Electronically Signed: Martin Huerta MD  11/15/2024 11:05 AM EST  Workstation ID: [id number]     Results for orders placed during the hospital encounter of 11/08/24    Adult Transthoracic Echo Complete w/ Color, Spectral and Contrast (If Necessary Per Protocol)    Interpretation Summary    Left ventricular systolic function is normal. Calculated left ventricular 3D EF = 63% Left ventricular ejection fraction appears to be 61 - 65%.    Trace mitral valve regurgitation is present.    Normal left atrial size and volume noted.      Current medications:  Scheduled Meds:pantoprazole, 40 mg, Oral, BID  sodium  chloride, 10 mL, Intravenous, Q12H      Continuous Infusions:   PRN Meds:.  benzonatate    senna-docusate sodium **AND** polyethylene glycol **AND** bisacodyl **AND** bisacodyl    Calcium Replacement - Follow Nurse / BPA Driven Protocol    diazePAM    HYDROcodone-acetaminophen    Magnesium Standard Dose Replacement - Follow Nurse / BPA Driven Protocol    ondansetron    Phosphorus Replacement - Follow Nurse / BPA Driven Protocol    Potassium Replacement - Follow Nurse / BPA Driven Protocol    [COMPLETED] Insert Peripheral IV **AND** sodium chloride    sodium chloride    sodium chloride    Assessment & Plan   Assessment & Plan     Active Hospital Problems    Diagnosis  POA    Syncope [R55]  Yes    GERD (gastroesophageal reflux disease) [K21.9]  Yes      Resolved Hospital Problems   No resolved problems to display.        Brief Hospital Course to date:  Hussain Feliz is a 49 y.o. male with history of vocal cord dysfunction, GREGOR, GERD, tobacco abuse, recent admission here for syncope and disorientation in the setting of viral infection/bronchitis, seen by ENT and pulmonary treated with steroids and antibiotics, s/p flex laryngoscopy discharged home 11/12  He presented back to the ED after having approximately 3 syncopal episodes in the past 24 hours with significant trauma, bruising, nose fracture.       Syncope likely vasovagal in etiology due to cough/laryngospasms  -He is s/p extensive evaluation and w/u during his last hospitalization, this did include CTA chest, stress test, echo, EEG and telemonitoring all of which were negative  -Etiology unknown but is suspected to be secondary vasovagal syncope from laryngospasms .   -He did undergo flexible laryngoscopy with ENT during the last admission, anticipate he will need a reevaluation.  ENT consulted by the ER physician, previously seen by Dr. Mehta  -- Appreciate Dr. Mehta's evaluation, he also advised that we consult Pulmonary.    -He did receive Valium in the  ED for possible spasms, this seemed to help him, per his wife.  Will schedule low dose Valium for today.  Advised that this is a temporary solution.   -Patient currently has a Holter in place, Cardiology unable to interrogate until monitor time period is complete (at which point, patient has to mail this in).  Cardiology doubts cardiogenic etiology of syncope.  Will continue to monitor on tele.  -- had another episode of syncope on 11/15 evening and again this am  -- d/w Pulmonary, Dr. Espinoza.  Added on inhalers for bronchospasm and PRN antitussives.  Appreciate Dr. Espinoza's evaluation.      Dysphagia  Chronic cough, h/o Asthma   -Patient reports having sensation of food getting stuck in his throat, was previously seen by SLP  -He did have an upcoming GI appointment for EGD, but suspect they may need to reevaluate sooner as suspect this could be contributory to above, GI consulted.     GERD  -continue PPI     Hemoconcentration  -Suspect patient was dry on admission, s/p IV fluids 100 mL/h for 12 hours       Total time spent: Time Spent: Time Spent: 35 minutes  Time spent includes time reviewing chart, face-to-face time, counseling patient/family/caregiver, ordering medications/tests/procedures, communicating with other health care professionals, documenting clinical information in the electronic health record, and coordination of care.      Expected Discharge Location and Transportation: home  Expected Discharge   Expected Discharge Date: 11/18/2024; Expected Discharge Time:      VTE Prophylaxis:  Mechanical VTE prophylaxis orders are present.         AM-PAC 6 Clicks Score (PT): 20 (11/15/24 2000)    CODE STATUS:   Code Status and Medical Interventions: CPR (Attempt to Resuscitate); Full Support   Ordered at: 11/15/24 1355     Code Status (Patient has no pulse and is not breathing):    CPR (Attempt to Resuscitate)     Medical Interventions (Patient has pulse or is breathing):    Full Support       Natalie Comer,  MD  11/16/24

## 2024-11-16 NOTE — PLAN OF CARE
Goal Outcome Evaluation:  Plan of Care Reviewed With: patient, spouse, family           Outcome Evaluation: OT eval completed. Pt presents near baseline for ADL performance with no needs currently identified warranting skilled OT intervention. Pt SBA to ambulate to bathroom, completed grooming tasks standing sink side with SBA/no AD, no c/o dizziness or lightheadedness reported with positional changes. OT signing off.    Anticipated Discharge Disposition (OT): home with assist

## 2024-11-16 NOTE — PROGRESS NOTES
ENT Progress Note      Subjective       Review of Systems:    All systems were reviewed and negative.     Objective     Vital Signs  Temp:  [98 °F (36.7 °C)-98.3 °F (36.8 °C)] 98 °F (36.7 °C)  Heart Rate:  [] 53  Resp:  [14-18] 18  BP: ()/(58-93) 122/75    Physical Exam:  General Appearance:    Alert, cooperative, in no acute distress, no audible stridor   Head:    Normocephalic, without obvious abnormality, atraumatic   Eyes:          conjunctivae and sclerae normal, corneas clear, PERRLA   Ears:    Ear canals clear, right TM normal, left TM normal   Nose:   Nasal mucosa moist and clear, septum midline   Oral Exam:   No oral lesions, no thrush, oral mucosa moist, tonsils 2+   Neck:   No adenopathy, supple, trachea midline, no thyromegaly   Salivary Glands:     No palpable masses   Lungs:     Clear to auscultation    Heart:    Regular rhythm and normal rate   Neurologic:   Cranial nerves II-XII grossly intact, no spontaneous   nystagmus        Results Review:    Results from last 7 days   Lab Units 11/15/24  1038   WBC 10*3/mm3 7.05   HEMOGLOBIN g/dL 18.5*   HEMATOCRIT % 52.7*   PLATELETS 10*3/mm3 226     Results from last 7 days   Lab Units 11/15/24  1155   SODIUM mmol/L 140   POTASSIUM mmol/L 4.2   CHLORIDE mmol/L 105   CO2 mmol/L 27.0   BUN mg/dL 9   CREATININE mg/dL 1.09   GLUCOSE mg/dL 103*   CALCIUM mg/dL 8.7     Imaging Results (Last 24 Hours)       Procedure Component Value Units Date/Time    CT Head Without Contrast [[id number]] Collected: 11/15/24 1032     Updated: 11/15/24 1108    Narrative:      CT HEAD WO CONTRAST, CT FACIAL BONES WO CONTRAST    Date of Exam: 11/15/2024 10:21 AM EST    Indication: head injury.    Comparison: 11/8/2024 CTA head neck    Technique: Axial CT images were obtained of the head and facial bones without contrast administration.  Automated exposure control and iterative construction methods were used.      Findings:  HEAD:    Parenchyma:No acute intraparenchymal  hemorrhage. No loss of gray-white differentiation to suggest large territory infarct. Normal parenchymal volume. No substantial white matter disease. No midline shift or herniation.    Ventricles and extra axial spaces:Normal caliber of ventricles and sulci. No extra axial fluid collection seen.    Other:Orbits are grossly intact. Scattered paranasal sinus mucosal thickening. Mastoid air cells are clear. Calvarium is intact. No substantial intracranial atherosclerotic calcification.    FACE:    Bones: Some irregularity of the distal nasal bone represent nondisplaced fracture. No evidence of fracture otherwise.    Soft tissue: Mild swelling of the nose. No suspicious adenopathy. Aerodigestive tract is grossly patent. No soft tissue mass or fluid collection.      Impression:      Impression:  No acute intracranial traumatic injury.    Mild soft tissue swelling of the nose with irregularity of the distal nasal bone which may reflect nondisplaced fracture. Recommend correlation with any focal pain here.        Electronically Signed: Martin Huerta MD    11/15/2024 11:05 AM EST    Workstation ID: [id number]    CT Facial Bones Without Contrast [[id number]] Collected: 11/15/24 1032     Updated: 11/15/24 1108    Narrative:      CT HEAD WO CONTRAST, CT FACIAL BONES WO CONTRAST    Date of Exam: 11/15/2024 10:21 AM EST    Indication: head injury.    Comparison: 11/8/2024 CTA head neck    Technique: Axial CT images were obtained of the head and facial bones without contrast administration.  Automated exposure control and iterative construction methods were used.      Findings:  HEAD:    Parenchyma:No acute intraparenchymal hemorrhage. No loss of gray-white differentiation to suggest large territory infarct. Normal parenchymal volume. No substantial white matter disease. No midline shift or herniation.    Ventricles and extra axial spaces:Normal caliber of ventricles and sulci. No extra axial fluid collection  seen.    Other:Orbits are grossly intact. Scattered paranasal sinus mucosal thickening. Mastoid air cells are clear. Calvarium is intact. No substantial intracranial atherosclerotic calcification.    FACE:    Bones: Some irregularity of the distal nasal bone represent nondisplaced fracture. No evidence of fracture otherwise.    Soft tissue: Mild swelling of the nose. No suspicious adenopathy. Aerodigestive tract is grossly patent. No soft tissue mass or fluid collection.      Impression:      Impression:  No acute intracranial traumatic injury.    Mild soft tissue swelling of the nose with irregularity of the distal nasal bone which may reflect nondisplaced fracture. Recommend correlation with any focal pain here.        Electronically Signed: Martin Huerta MD    11/15/2024 11:05 AM EST    Workstation ID: [id number]            Assessment:  Syncopal episodes related to laryngospasm possible subglottic narrowing-appears to be reactive but etiology still unclear      GERD (gastroesophageal reflux disease)    Syncope       Plan:  I agree with muscle relaxants, hopefully we can avoid benzodiazepines in the future but is possible this could help relax some of his spasmodic cough fits.  Would also consider treating for neuro genic cough.  Some studies have shown use of neuromodulators such as gabapentin could be beneficial.  Will await pulmonary eval and recommendations      Roberto Mehta MD  11/16/24  09:11 EST

## 2024-11-17 ENCOUNTER — READMISSION MANAGEMENT (OUTPATIENT)
Dept: CALL CENTER | Facility: HOSPITAL | Age: 49
End: 2024-11-17
Payer: COMMERCIAL

## 2024-11-17 ENCOUNTER — ANESTHESIA (OUTPATIENT)
Dept: GASTROENTEROLOGY | Facility: HOSPITAL | Age: 49
End: 2024-11-17
Payer: COMMERCIAL

## 2024-11-17 ENCOUNTER — ANESTHESIA EVENT (OUTPATIENT)
Dept: GASTROENTEROLOGY | Facility: HOSPITAL | Age: 49
End: 2024-11-17
Payer: COMMERCIAL

## 2024-11-17 VITALS
TEMPERATURE: 98 F | DIASTOLIC BLOOD PRESSURE: 57 MMHG | WEIGHT: 241.7 LBS | HEART RATE: 60 BPM | HEIGHT: 71 IN | RESPIRATION RATE: 16 BRPM | OXYGEN SATURATION: 92 % | SYSTOLIC BLOOD PRESSURE: 105 MMHG | BODY MASS INDEX: 33.84 KG/M2

## 2024-11-17 LAB
ANION GAP SERPL CALCULATED.3IONS-SCNC: 12 MMOL/L (ref 5–15)
BASOPHILS # BLD AUTO: 0.04 10*3/MM3 (ref 0–0.2)
BASOPHILS NFR BLD AUTO: 0.5 % (ref 0–1.5)
BUN SERPL-MCNC: 11 MG/DL (ref 6–20)
BUN/CREAT SERPL: 11.3 (ref 7–25)
CALCIUM SPEC-SCNC: 8.4 MG/DL (ref 8.6–10.5)
CHLORIDE SERPL-SCNC: 107 MMOL/L (ref 98–107)
CO2 SERPL-SCNC: 23 MMOL/L (ref 22–29)
CREAT SERPL-MCNC: 0.97 MG/DL (ref 0.76–1.27)
DEPRECATED RDW RBC AUTO: 43.4 FL (ref 37–54)
EGFRCR SERPLBLD CKD-EPI 2021: 96.3 ML/MIN/1.73
EOSINOPHIL # BLD AUTO: 0.25 10*3/MM3 (ref 0–0.4)
EOSINOPHIL NFR BLD AUTO: 3.4 % (ref 0.3–6.2)
ERYTHROCYTE [DISTWIDTH] IN BLOOD BY AUTOMATED COUNT: 12.9 % (ref 12.3–15.4)
GLUCOSE SERPL-MCNC: 88 MG/DL (ref 65–99)
HCT VFR BLD AUTO: 47.2 % (ref 37.5–51)
HGB BLD-MCNC: 16.8 G/DL (ref 13–17.7)
IMM GRANULOCYTES # BLD AUTO: 0.02 10*3/MM3 (ref 0–0.05)
IMM GRANULOCYTES NFR BLD AUTO: 0.3 % (ref 0–0.5)
LYMPHOCYTES # BLD AUTO: 2.27 10*3/MM3 (ref 0.7–3.1)
LYMPHOCYTES NFR BLD AUTO: 31.1 % (ref 19.6–45.3)
MCH RBC QN AUTO: 32.9 PG (ref 26.6–33)
MCHC RBC AUTO-ENTMCNC: 35.6 G/DL (ref 31.5–35.7)
MCV RBC AUTO: 92.5 FL (ref 79–97)
MONOCYTES # BLD AUTO: 0.53 10*3/MM3 (ref 0.1–0.9)
MONOCYTES NFR BLD AUTO: 7.3 % (ref 5–12)
NEUTROPHILS NFR BLD AUTO: 4.18 10*3/MM3 (ref 1.7–7)
NEUTROPHILS NFR BLD AUTO: 57.4 % (ref 42.7–76)
NRBC BLD AUTO-RTO: 0 /100 WBC (ref 0–0.2)
PLATELET # BLD AUTO: 207 10*3/MM3 (ref 140–450)
PMV BLD AUTO: 9.6 FL (ref 6–12)
POTASSIUM SERPL-SCNC: 3.9 MMOL/L (ref 3.5–5.2)
RBC # BLD AUTO: 5.1 10*6/MM3 (ref 4.14–5.8)
SODIUM SERPL-SCNC: 142 MMOL/L (ref 136–145)
WBC NRBC COR # BLD AUTO: 7.29 10*3/MM3 (ref 3.4–10.8)

## 2024-11-17 PROCEDURE — G0378 HOSPITAL OBSERVATION PER HR: HCPCS

## 2024-11-17 PROCEDURE — 43239 EGD BIOPSY SINGLE/MULTIPLE: CPT | Performed by: INTERNAL MEDICINE

## 2024-11-17 PROCEDURE — C1769 GUIDE WIRE: HCPCS | Performed by: INTERNAL MEDICINE

## 2024-11-17 PROCEDURE — 25010000002 LIDOCAINE PF 1% 1 % SOLUTION: Performed by: NURSE ANESTHETIST, CERTIFIED REGISTERED

## 2024-11-17 PROCEDURE — 43248 EGD GUIDE WIRE INSERTION: CPT | Performed by: INTERNAL MEDICINE

## 2024-11-17 PROCEDURE — 99239 HOSP IP/OBS DSCHRG MGMT >30: CPT | Performed by: INTERNAL MEDICINE

## 2024-11-17 PROCEDURE — 25810000003 LACTATED RINGERS PER 1000 ML: Performed by: NURSE ANESTHETIST, CERTIFIED REGISTERED

## 2024-11-17 PROCEDURE — 94799 UNLISTED PULMONARY SVC/PX: CPT

## 2024-11-17 PROCEDURE — 88305 TISSUE EXAM BY PATHOLOGIST: CPT | Performed by: INTERNAL MEDICINE

## 2024-11-17 PROCEDURE — 85025 COMPLETE CBC W/AUTO DIFF WBC: CPT | Performed by: INTERNAL MEDICINE

## 2024-11-17 PROCEDURE — 25010000002 PROPOFOL 10 MG/ML EMULSION: Performed by: NURSE ANESTHETIST, CERTIFIED REGISTERED

## 2024-11-17 PROCEDURE — 80048 BASIC METABOLIC PNL TOTAL CA: CPT | Performed by: INTERNAL MEDICINE

## 2024-11-17 RX ORDER — IPRATROPIUM BROMIDE 42 UG/1
2 SPRAY, METERED NASAL 4 TIMES DAILY
Qty: 15 ML | Refills: 0 | Status: SHIPPED | OUTPATIENT
Start: 2024-11-17 | End: 2024-11-19

## 2024-11-17 RX ORDER — FAMOTIDINE 10 MG/ML
20 INJECTION, SOLUTION INTRAVENOUS ONCE
Status: DISCONTINUED | OUTPATIENT
Start: 2024-11-17 | End: 2024-11-17 | Stop reason: HOSPADM

## 2024-11-17 RX ORDER — LIDOCAINE HYDROCHLORIDE 10 MG/ML
INJECTION, SOLUTION EPIDURAL; INFILTRATION; INTRACAUDAL; PERINEURAL AS NEEDED
Status: DISCONTINUED | OUTPATIENT
Start: 2024-11-17 | End: 2024-11-17 | Stop reason: SURG

## 2024-11-17 RX ORDER — ONDANSETRON 2 MG/ML
4 INJECTION INTRAMUSCULAR; INTRAVENOUS ONCE AS NEEDED
Status: DISCONTINUED | OUTPATIENT
Start: 2024-11-17 | End: 2024-11-17 | Stop reason: HOSPADM

## 2024-11-17 RX ORDER — LIDOCAINE HYDROCHLORIDE 10 MG/ML
0.5 INJECTION, SOLUTION EPIDURAL; INFILTRATION; INTRACAUDAL; PERINEURAL ONCE AS NEEDED
Status: DISCONTINUED | OUTPATIENT
Start: 2024-11-17 | End: 2024-11-17 | Stop reason: HOSPADM

## 2024-11-17 RX ORDER — GABAPENTIN 100 MG/1
100 CAPSULE ORAL 3 TIMES DAILY
Qty: 90 CAPSULE | Refills: 0 | Status: SHIPPED | OUTPATIENT
Start: 2024-11-17

## 2024-11-17 RX ORDER — PROPOFOL 10 MG/ML
VIAL (ML) INTRAVENOUS AS NEEDED
Status: DISCONTINUED | OUTPATIENT
Start: 2024-11-17 | End: 2024-11-17 | Stop reason: SURG

## 2024-11-17 RX ORDER — SODIUM CHLORIDE, SODIUM LACTATE, POTASSIUM CHLORIDE, CALCIUM CHLORIDE 600; 310; 30; 20 MG/100ML; MG/100ML; MG/100ML; MG/100ML
INJECTION, SOLUTION INTRAVENOUS CONTINUOUS PRN
Status: DISCONTINUED | OUTPATIENT
Start: 2024-11-17 | End: 2024-11-17 | Stop reason: SURG

## 2024-11-17 RX ORDER — FAMOTIDINE 20 MG/1
20 TABLET, FILM COATED ORAL ONCE
Status: DISCONTINUED | OUTPATIENT
Start: 2024-11-17 | End: 2024-11-17 | Stop reason: HOSPADM

## 2024-11-17 RX ORDER — SODIUM CHLORIDE 0.9 % (FLUSH) 0.9 %
10 SYRINGE (ML) INJECTION AS NEEDED
Status: DISCONTINUED | OUTPATIENT
Start: 2024-11-17 | End: 2024-11-17 | Stop reason: HOSPADM

## 2024-11-17 RX ORDER — MIDAZOLAM HYDROCHLORIDE 1 MG/ML
1 INJECTION, SOLUTION INTRAMUSCULAR; INTRAVENOUS
Status: DISCONTINUED | OUTPATIENT
Start: 2024-11-17 | End: 2024-11-17 | Stop reason: HOSPADM

## 2024-11-17 RX ORDER — SODIUM CHLORIDE 0.9 % (FLUSH) 0.9 %
10 SYRINGE (ML) INJECTION EVERY 12 HOURS SCHEDULED
Status: DISCONTINUED | OUTPATIENT
Start: 2024-11-17 | End: 2024-11-17 | Stop reason: HOSPADM

## 2024-11-17 RX ORDER — BUDESONIDE AND FORMOTEROL FUMARATE DIHYDRATE 160; 4.5 UG/1; UG/1
2 AEROSOL RESPIRATORY (INHALATION)
Qty: 10.2 G | Refills: 1 | Status: SHIPPED | OUTPATIENT
Start: 2024-11-17

## 2024-11-17 RX ORDER — DIAZEPAM 5 MG/1
5 TABLET ORAL EVERY 6 HOURS SCHEDULED
Qty: 20 TABLET | Refills: 0 | Status: SHIPPED | OUTPATIENT
Start: 2024-11-17 | End: 2024-11-22

## 2024-11-17 RX ORDER — SODIUM CHLORIDE, SODIUM LACTATE, POTASSIUM CHLORIDE, CALCIUM CHLORIDE 600; 310; 30; 20 MG/100ML; MG/100ML; MG/100ML; MG/100ML
9 INJECTION, SOLUTION INTRAVENOUS CONTINUOUS
Status: DISCONTINUED | OUTPATIENT
Start: 2024-11-18 | End: 2024-11-17 | Stop reason: HOSPADM

## 2024-11-17 RX ADMIN — PROPOFOL 50 MG: 10 INJECTION, EMULSION INTRAVENOUS at 09:09

## 2024-11-17 RX ADMIN — PROPOFOL 50 MG: 10 INJECTION, EMULSION INTRAVENOUS at 09:12

## 2024-11-17 RX ADMIN — BUDESONIDE AND FORMOTEROL FUMARATE DIHYDRATE 2 PUFF: 160; 4.5 AEROSOL RESPIRATORY (INHALATION) at 10:40

## 2024-11-17 RX ADMIN — LIDOCAINE HYDROCHLORIDE 50 MG: 10 INJECTION, SOLUTION EPIDURAL; INFILTRATION; INTRACAUDAL; PERINEURAL at 09:06

## 2024-11-17 RX ADMIN — PROPOFOL 100 MG: 10 INJECTION, EMULSION INTRAVENOUS at 09:06

## 2024-11-17 RX ADMIN — SODIUM CHLORIDE, POTASSIUM CHLORIDE, SODIUM LACTATE AND CALCIUM CHLORIDE: 600; 310; 30; 20 INJECTION, SOLUTION INTRAVENOUS at 09:03

## 2024-11-17 NOTE — ANESTHESIA POSTPROCEDURE EVALUATION
Patient: Hussain Feliz    Procedure Summary       Date: 11/17/24 Room / [facility] ENDOSCOPY 3 /  [facility]    Anesthesia Start: 0903 Anesthesia Stop: 0925    Procedure: ESOPHAGOGASTRODUODENOSCOPY Diagnosis:     Surgeons: Brunner, Mark I, MD Provider: Martha Davis MD    Anesthesia Type: general ASA Status: 2            Anesthesia Type: general    Vitals  No vitals data found for the desired time range.          Post Anesthesia Care and Evaluation    Patient location during evaluation: PACU  Patient participation: complete - patient participated  Level of consciousness: awake and alert  Pain management: adequate    Airway patency: patent  Anesthetic complications: No anesthetic complications  PONV Status: none  Cardiovascular status: hemodynamically stable and acceptable  Respiratory status: nonlabored ventilation, acceptable and nasal cannula  Hydration status: acceptable

## 2024-11-17 NOTE — PLAN OF CARE
"Goal Outcome Evaluation:  Plan of Care Reviewed With: patient        Progress: no change        No changes overnight. Patient did have one episode of coughing with syncope witnessed by father while he was in bed. Father reports patient was \"out for just a second.\" VSS on RA. NSR on monitor. NPO since midnight for EGD.                        "

## 2024-11-17 NOTE — ANESTHESIA PREPROCEDURE EVALUATION
Anesthesia Evaluation     Patient summary reviewed and Nursing notes reviewed   no history of anesthetic complications:   NPO Solid Status: > 8 hours  NPO Liquid Status: > 8 hours           Airway   Mallampati: II  TM distance: >3 FB  Neck ROM: full  No difficulty expected  Dental - normal exam     Pulmonary - normal exam   (+) pneumonia (2-3 weeks ago) improving ,sleep apnea (no official diagnosis)    ROS comment: Cough with syncope  Cardiovascular - negative cardio ROS and normal exam        Neuro/Psych  (+) syncope (with coughing)  (-) seizures, TIA, CVA  GI/Hepatic/Renal/Endo    (+) hiatal hernia, GERD  (-) liver disease, no renal disease, diabetes, no thyroid disorder    Musculoskeletal     Abdominal    Substance History      OB/GYN          Other                      Anesthesia Plan    ASA 2     general     intravenous induction     Anesthetic plan, risks, benefits, and alternatives have been provided, discussed and informed consent has been obtained with: patient.    Plan discussed with CRNA.    CODE STATUS:    Code Status (Patient has no pulse and is not breathing): CPR (Attempt to Resuscitate)  Medical Interventions (Patient has pulse or is breathing): Full Support

## 2024-11-17 NOTE — BRIEF OP NOTE
ESOPHAGOGASTRODUODENOSCOPY  Progress Note    Hussain Feliz  11/17/2024    EGD reveals a diminutive aphthous ulceration at the GE junction, biopsied for histology.  Stomach and duodenum are normal.  No evidence for reflux esophagitis is seen.    >> Continue PPI  >> Await pathology  >> Given globus sensation, consider Elavil if symptoms persist.    Mark I. Brunner, MD     Date: 11/17/2024  Time: 09:17 EST

## 2024-11-17 NOTE — DISCHARGE SUMMARY
[facility] Medicine Services  DISCHARGE SUMMARY    Patient Name: Hussain Feliz  : 1975  MRN: [id number]    Date of Admission: 11/15/2024  9:30 AM  Date of Discharge:  2024  Primary Care Physician: Hilda Beckett PA    Consults       Date and Time Order Name Status Description    11/15/2024  6:13 PM Inpatient Pulmonology Consult Completed     11/15/2024  2:52 PM Inpatient ENT Consult Completed     11/15/2024  2:03 PM Inpatient Gastroenterology Consult Completed     11/15/2024  2:02 PM Inpatient Cardiology Consult Completed     2024  1:45 PM Inpatient Pulmonology Consult Completed     2024 11:28 PM Inpatient Neurology Consult General Completed     2024 11:28 PM Inpatient Cardiology Consult Completed             Hospital Course     Presenting Problem: recurrent syncope related to cough     Active Hospital Problems    Diagnosis  POA    Syncope [R55]  Yes    GERD (gastroesophageal reflux disease) [K21.9]  Yes    Vocal cord dysfunction [J38.3]  Yes    GREGOR (obstructive sleep apnea) [G47.33]  Yes      Resolved Hospital Problems   No resolved problems to display.          Hospital Course:  Hussain Feliz is a 49 y.o. male with history of vocal cord dysfunction, GREGOR, GERD, tobacco abuse, recent admission here for syncope and disorientation in the setting of viral infection/bronchitis, seen by ENT and pulmonary treated with steroids and antibiotics, s/p flex laryngoscopy discharged home   He presented back to the ED after having approximately 3 syncopal episodes in the past 24 hours with significant trauma, bruising, nose fracture.       Syncope likely vasovagal in etiology due to cough/laryngospasms  -He is s/p extensive evaluation and w/u during his last hospitalization, this did include CTA chest, stress test, echo, EEG and telemonitoring all of which were negative  -Etiology unknown but is suspected to be secondary vasovagal syncope from laryngospasms .    -He did undergo flexible laryngoscopy with ENT during the last admission. ENT consulted, Dr. Mehta re-evaluated   -- Appreciate Dr. Mehta's evaluation, he also advised that we consult Pulmonary.    -will continue with Valium scheduled upon d/c as this seemed to help some. Pt aware that this is not a long term medication and is aware of side effects.  -Patient currently has a Holter in place, Cardiology will follow up results on Holter once it is mailed in. Pt has had no events noted on telemetry since he has been here.   -- Gabapentin low dose started by Pulm and this also seems to have helped some  -- Pulmonary added Symbicort for cough and suspected asthma/reactive airways disease.  Pt will need PFTs as outpatient.     -- Pulm also added Atrovent nasal spray for post nasal  drip related cough  -- continue PPI for GERD in case this is also contributing   -- Pulm to also refer to Dr. Tomlin  at  ENT as he is a chronic cough specialist.      Dysphagia  Chronic cough, h/o Asthma   -Patient reports having sensation of food getting stuck in his throat, was previously seen by [facility]  -We consulted GI on admission as he did have an upcoming GI appointment for EGD- will undergo EGD this am per GI recommendations.   -- needs outpatient screening c-scope per GI recs as well.      GERD  -continue PPI     Hemoconcentration  -- improved with IVFs      Discharge Follow Up Recommendations for outpatient labs/diagnostics:   Follow up with PCP as previously arranged (11/19/24)  Follow up with below mentioned providers    Day of Discharge     HPI:   Doing okay today, had one brief syncopal episode yesterday while coughing but has not yet had any others.  Slept well. Thinks Gabapentin and Valium have been helping somewhat.  Wants to go home today.     Review of Systems  Gen- No fevers, chills  CV- No chest pain, palpitations  Resp- + cough   GI- No N/V/D, abd pain     Vital Signs:   Temp:  [97.6 °F (36.4 °C)-98.2 °F (36.8 °C)] 98  °F (36.7 °C)  Heart Rate:  [59-86] 59  Resp:  [16-18] 18  BP: (119-144)/(77-91) 119/80      Physical Exam:  Constitutional: No acute distress, awake, alert  HENT: NCAT, mucous membranes moist  Respiratory: Clear to auscultation bilaterally, respiratory effort normal   Cardiovascular: RRR, no murmurs, rubs, or gallops  Gastrointestinal: Positive bowel sounds, soft, nontender, nondistended  Musculoskeletal: No bilateral ankle edema  Psychiatric: Appropriate affect, cooperative  Neurologic: Oriented x 3, strength symmetric in all extremities, Cranial Nerves grossly intact to confrontation, speech clear  Skin: No rashes     Pertinent  and/or Most Recent Results     LAB RESULTS:      Lab 11/17/24  0542 11/15/24  1038   WBC 7.29 7.05   HEMOGLOBIN 16.8 18.5*   HEMATOCRIT 47.2 52.7*   PLATELETS 207 226   NEUTROS ABS 4.18 4.71   IMMATURE GRANS (ABS) 0.02 0.07*   LYMPHS ABS 2.27 1.61   MONOS ABS 0.53 0.45   EOS ABS 0.25 0.18   MCV 92.5 92.3         Lab 11/17/24  0542 11/15/24  1155   SODIUM 142 140   POTASSIUM 3.9 4.2   CHLORIDE 107 105   CO2 23.0 27.0   ANION GAP 12.0 8.0   BUN 11 9   CREATININE 0.97 1.09   EGFR 96.3 83.7   GLUCOSE 88 103*   CALCIUM 8.4* 8.7         Lab 11/15/24  1155   TOTAL PROTEIN 6.8   ALBUMIN 4.1   GLOBULIN 2.7   ALT (SGPT) 38   AST (SGOT) 30   BILIRUBIN 1.1   ALK PHOS 82         Lab 11/15/24  1155   HSTROP T 9                 Brief Urine Lab Results  (Last result in the past 365 days)        Color   Clarity   Blood   Leuk Est   Nitrite   Protein   CREAT   Urine HCG        11/08/24 1555 Yellow   Clear   Negative   Negative   Negative   Negative                 Microbiology Results (last 10 days)       Procedure Component Value - Date/Time    Respiratory Panel PCR w/COVID-19(SARS-CoV-2) NATANAEL/YAA/LUCIE/PAD/COR/JOSE In-House, NP Swab in UTM/VTM, 2 HR TAT - Swab, Nasopharynx [[id number]]  (Normal) Collected: 11/08/24 1436    Lab Status: Final result Specimen: Swab from Nasopharynx Updated: 11/08/24 1531      ADENOVIRUS, PCR Not Detected     Coronavirus 229E Not Detected     Coronavirus HKU1 Not Detected     Coronavirus NL63 Not Detected     Coronavirus OC43 Not Detected     COVID19 Not Detected     Human Metapneumovirus Not Detected     Human Rhinovirus/Enterovirus Not Detected     Influenza A PCR Not Detected     Influenza B PCR Not Detected     Parainfluenza Virus 1 Not Detected     Parainfluenza Virus 2 Not Detected     Parainfluenza Virus 3 Not Detected     Parainfluenza Virus 4 Not Detected     RSV, PCR Not Detected     Bordetella pertussis pcr Not Detected     Bordetella parapertussis PCR Not Detected     Chlamydophila pneumoniae PCR Not Detected     Mycoplasma pneumo by PCR Not Detected    Narrative:      In the setting of a positive respiratory panel with a viral infection PLUS a negative procalcitonin without other underlying concern for bacterial infection, consider observing off antibiotics or discontinuation of antibiotics and continue supportive care. If the respiratory panel is positive for atypical bacterial infection (Bordetella pertussis, Chlamydophila pneumoniae, or Mycoplasma pneumoniae), consider antibiotic de-escalation to target atypical bacterial infection.            CT Head Without Contrast    Result Date: 11/15/2024  CT HEAD WO CONTRAST, CT FACIAL BONES WO CONTRAST Date of Exam: 11/15/2024 10:21 AM EST Indication: head injury. Comparison: 11/8/2024 CTA head neck Technique: Axial CT images were obtained of the head and facial bones without contrast administration.  Automated exposure control and iterative construction methods were used. Findings: HEAD: Parenchyma:No acute intraparenchymal hemorrhage. No loss of gray-white differentiation to suggest large territory infarct. Normal parenchymal volume. No substantial white matter disease. No midline shift or herniation. Ventricles and extra axial spaces:Normal caliber of ventricles and sulci. No extra axial fluid collection seen. Other:Orbits are  grossly intact. Scattered paranasal sinus mucosal thickening. Mastoid air cells are clear. Calvarium is intact. No substantial intracranial atherosclerotic calcification. FACE: Bones: Some irregularity of the distal nasal bone represent nondisplaced fracture. No evidence of fracture otherwise. Soft tissue: Mild swelling of the nose. No suspicious adenopathy. Aerodigestive tract is grossly patent. No soft tissue mass or fluid collection.     Impression: No acute intracranial traumatic injury. Mild soft tissue swelling of the nose with irregularity of the distal nasal bone which may reflect nondisplaced fracture. Recommend correlation with any focal pain here. Electronically Signed: Martin Huerta MD  11/15/2024 11:05 AM EST  Workstation ID: [id number]    CT Facial Bones Without Contrast    Result Date: 11/15/2024  CT HEAD WO CONTRAST, CT FACIAL BONES WO CONTRAST Date of Exam: 11/15/2024 10:21 AM EST Indication: head injury. Comparison: 11/8/2024 CTA head neck Technique: Axial CT images were obtained of the head and facial bones without contrast administration.  Automated exposure control and iterative construction methods were used. Findings: HEAD: Parenchyma:No acute intraparenchymal hemorrhage. No loss of gray-white differentiation to suggest large territory infarct. Normal parenchymal volume. No substantial white matter disease. No midline shift or herniation. Ventricles and extra axial spaces:Normal caliber of ventricles and sulci. No extra axial fluid collection seen. Other:Orbits are grossly intact. Scattered paranasal sinus mucosal thickening. Mastoid air cells are clear. Calvarium is intact. No substantial intracranial atherosclerotic calcification. FACE: Bones: Some irregularity of the distal nasal bone represent nondisplaced fracture. No evidence of fracture otherwise. Soft tissue: Mild swelling of the nose. No suspicious adenopathy. Aerodigestive tract is grossly patent. No soft tissue mass or fluid  collection.     Impression: No acute intracranial traumatic injury. Mild soft tissue swelling of the nose with irregularity of the distal nasal bone which may reflect nondisplaced fracture. Recommend correlation with any focal pain here. Electronically Signed: Martin Huerta MD  11/15/2024 11:05 AM EST  Workstation ID: [id number]    [facility] FEES - Fiberoptic Endo Eval Swallow    Result Date: 11/12/2024  This procedure was auto-finalized with no dictation required.    EEG Continuous Monitoring With Video    Result Date: 11/11/2024  Date of Procedure: 11-10 at 11 AM through 11-11 at 7:45 AM Reason for referral: 49 y.o.male with recurrent episodes of loss of consciousness, consideration of seizures, seizure classification Technical summary: A 19 channel digital EEG is performed using the international 10-20 placement system, including eye leads and EKG leads.  Video is available, and split screen technology with video/EEG correlation is used as needed.   A patient event button is offered. Review of the relevant video-EEG data was performed throughout the day.  Detailed review of the EEG and relevant video was performed using multiple montages, including a variety of referential and bipolar montages.  Results of the monitoring related to the treatment team frequently during the study, at least once a day.  Where available, seizure detection software was used for the detection of ictal and interictal discharges. Findings: At the beginning of the study, the patient is awake and resting comfortably in bed.  Diffuse low amplitude alpha activity is present symmetrically over both hemispheres.  EMG and eye blink artifact are noted anteriorly.  At times a well-regulated 9 Hz posterior rhythm is evident symmetrically over the occipital leads.  Shortly after study onset, around 11:30 in the morning, the patient has an episode of coughing.  He is observed to be sitting up in bed and there is movement artifact on the EEG.  He then by  report has a brief episode of loss of consciousness or altered mental status.  During this time, no changes in the EEG are seen.  No rhythmic or periodic discharges are noted.  No focal slowing is appreciated and no epileptiform activity is seen.  Over the course of the day, although the patient has additional coughing spells, there are no further associated episodes of altered mental status.  Overnight, sleep is seen with slowing of the background and vertex waves.  Around 3 AM, significant electrode artifact becomes apparent and the study becomes temporarily unreadable.  The head is rewrapped around 5:45 in the morning and the study quality is then again excellent.  No additional episodes are reported through the end of the study.       EEG background is normal in the awake and asleep states No focal features or epileptiform activity are seen A single episode of coughing followed by brief loss of consciousness has no EEG correlate No electrographic seizures are seen Summary: Over the entirety of this recording, no evidence for epilepsy is seen Findings communicated to the consultant neurologist This report is transcribed using the Dragon dictation system.     EEG Continuous Monitoring With Video    Result Date: 11/10/2024  Date of Procedure: 11-9 at 6:30 PM through 11-10 at 11 AM Reason for referral: 49 y.o.male with recurrent episodes of syncope, altered mental status, consideration of seizures Technical summary: A 19 channel digital EEG is performed using the international 10-20 placement system, including eye leads and EKG leads.  Video is available, and split screen technology with video/EEG correlation is used as needed.   A patient event button is offered. Review of the relevant video-EEG data was performed throughout the day.  Detailed review of the EEG and relevant video was performed using multiple montages, including a variety of referential and bipolar montages.  Results of the monitoring related to the  treatment team frequently during the study, at least once a day.  Where available, seizure detection software was used for the detection of ictal and interictal discharges. Findings: The patient is awake at the beginning of the study.  The background shows diffuse low amplitude theta and alpha activity present symmetrically over both hemispheres.  At times a 10 Hz posterior rhythm is evident over the occipital leads.  EMG and eye blink artifact are present anteriorly.  Overnight, sleep is seen with slowing of the background, and sleep spindles.  In the early morning hours, electrode artifact is present in the occipital leads and this becomes quite prominent in the last hour of the recording.  The patient's head is rewrapped shortly before 11 AM, and the study is again of good technical quality.  Over the course of the study.  No focal features or epileptiform activity are seen.  The patient event button is not pushed.  No clinical or electrographic seizures are seen.       Normal EEG in the awake and asleep states No focal features or epileptiform activity are seen No clinical or electrographic seizures are captured This report is transcribed using the Dragon dictation system.     EEG    Result Date: 11/9/2024  Reason for referral: 49 y.o.male recurrent syncope, consideration of seizures Technical Summary:  A 19 channel digital EEG was performed using the international 10-20 placement system, including eye leads and EKG leads. Duration: 20 minutes Findings: The patient is awake.  Diffuse low amplitude intermixed theta and alpha activity is present symmetrically over both hemispheres.  At times the tenderness posterior rhythm is evident.  Photic stimulation is a symmetric driving response.  Hyperventilation is not performed.  Stage II sleep is not seen.  No focal features or epileptiform activity are noted Video: Available Technical quality: Good EKG: Regular, 60 bpm SUMMARY: Normal EEG in the awake state No focal  features or epileptiform activity are seen     Normal study This report is transcribed using the Dragon dictation system.      MRI Brain Without Contrast    Result Date: 11/8/2024  MRI BRAIN WO CONTRAST Date of Exam: 11/8/2024 3:13 PM EST Indication: syncope.  Comparison: CT same day Technique:  Routine multiplanar/multisequence sequence images of the brain were obtained without contrast administration. Findings: Diffusion-weighted imaging demonstrates no acute restriction abnormality. Midline structures of the brain are grossly unremarkable in appearance. Pituitary and sella structures and craniocervical junction are grossly unremarkable in their appearance. The ventricles, cisterns and sulci appear within normal limits. No acute intracranial hemorrhage or mass effect is noted. Gray and white matter signal characteristics appear within normal limits. Major intracranial flow voids appear grossly patent. Please see CT angiogram same day for further evaluation. Mild mucosal thickening of the paranasal sinuses noted. Globes and orbits appear grossly unremarkable in their appearance. Mastoid air cells appear to be grossly clear. Cerebral pontine angle structures and inner ear structures appear grossly unremarkable in appearance.     Impression: No acute intracranial abnormality Electronically Signed: Cristian Richards MD  11/8/2024 3:51 PM EST  Workstation ID: [id number]    Treadmill Stress Test    Result Date: 11/8/2024    Pt denied chest pain/discomfort during exercise. Pt did c/o SOA (SP02 WNL on RA) that resolved in recovery.   THR of 146 achieved at 8:40.   Expected exercise duration = 10:40. Actual exercise duration = 8:50. Pt stopped d/t leg fatigue.   DTS of 15.8.   SR-ST with rare PVC in recovery.   No ST-T wave changes noted.   No ECG evidence of myocardial ischemia.   Negative clinical evidence of myocardial ischemia.   Findings consistent with a normal ECG stress test.     CT Angiogram Neck    Result Date:  11/8/2024  CT ANGIOGRAM NECK, CT ANGIOGRAM HEAD Date of Exam: 11/8/2024 12:54 PM EST Indication: syncope. Comparison: CT head from earlier today Technique: CTA of the head and neck was performed before and after the uneventful intravenous administration of 75 mL Isovue-370. Reconstructed coronal and sagittal images were also obtained. In addition, a 3-D volume rendered image was created for interpretation. Automated exposure control and iterative reconstruction methods were used. Findings: There is a three-vessel aortic arch. The right common carotid artery is widely patent with mild plaque distally. The right carotid bifurcation is widely patent. The right internal carotid artery is widely patent. The left common carotid artery is widely patent. The left carotid bifurcation is widely patent. The left internal carotid artery is widely patent. The right vertebral artery is widely patent. The left vertebral artery is widely patent. The left vertebral artery is dominant. The bilateral middle cerebral, bilateral anterior cerebral, and anterior communicating arteries are widely patent. The basilar and bilateral posterior cerebral arteries are widely patent. There are patent bilateral posterior communicating arteries. No intracranial aneurysm is identified. The visualized portions of the bilateral superior cerebellar, anteroinferior cerebellar, and posterior inferior cerebellar arteries are unremarkable.     Impression: Major arterial vasculature within head and neck appears widely patent, with no hemodynamically significant stenosis, dissection, thrombus, or aneurysm. Electronically Signed: Luis Dennis MD  11/8/2024 1:29 PM EST  Workstation ID: [id number]    CT Angiogram Head    Result Date: 11/8/2024  CT ANGIOGRAM NECK, CT ANGIOGRAM HEAD Date of Exam: 11/8/2024 12:54 PM EST Indication: syncope. Comparison: CT head from earlier today Technique: CTA of the head and neck was performed before and after the uneventful  intravenous administration of 75 mL Isovue-370. Reconstructed coronal and sagittal images were also obtained. In addition, a 3-D volume rendered image was created for interpretation. Automated exposure control and iterative reconstruction methods were used. Findings: There is a three-vessel aortic arch. The right common carotid artery is widely patent with mild plaque distally. The right carotid bifurcation is widely patent. The right internal carotid artery is widely patent. The left common carotid artery is widely patent. The left carotid bifurcation is widely patent. The left internal carotid artery is widely patent. The right vertebral artery is widely patent. The left vertebral artery is widely patent. The left vertebral artery is dominant. The bilateral middle cerebral, bilateral anterior cerebral, and anterior communicating arteries are widely patent. The basilar and bilateral posterior cerebral arteries are widely patent. There are patent bilateral posterior communicating arteries. No intracranial aneurysm is identified. The visualized portions of the bilateral superior cerebellar, anteroinferior cerebellar, and posterior inferior cerebellar arteries are unremarkable.     Impression: Major arterial vasculature within head and neck appears widely patent, with no hemodynamically significant stenosis, dissection, thrombus, or aneurysm. Electronically Signed: Luis Dennis MD  11/8/2024 1:29 PM EST  Workstation ID: [id number]    CT Head Without Contrast    Result Date: 11/8/2024  CT HEAD WO CONTRAST Date of Exam: 11/8/2024 12:54 PM EST Indication: syncope. Comparison: None available. Technique: Axial CT images were obtained of the head without contrast administration.  Automated exposure control and iterative construction methods were used. Findings: No acute intracranial hemorrhage or extra-axial collection is identified. The ventricles appear normal in caliber, with no evidence of mass effect or midline  shift. The basal cisterns appear patent. The gray-white differentiation appears preserved. The calvarium appear intact. The paranasal sinuses are clear. The mastoid air cells are well-aerated.     Impression: No acute intracranial process identified. Electronically Signed: Luis Dennis MD  11/8/2024 1:18 PM EST  Workstation ID: [id number]    CT Angiogram Chest    Result Date: 11/8/2024  CT ANGIOGRAM CHEST Date of Exam: 11/8/2024 11:15 AM EST Indication: syncope, concern for PE. Comparison: Chest x-ray from today Technique: CTA of the chest was performed after the uneventful intravenous administration of 85 mL Isovue-370. Reconstructed coronal and sagittal images were also obtained. In addition, a 3-D volume rendered image was created for interpretation. Automated exposure control and iterative reconstruction methods were used. FINDINGS: Thoracic inlet: Unremarkable. Pulmonary arteries: No filling defects are identified within the pulmonary arteries to suggest acute pulmonary embolism. Great vessels: The thoracic aorta and proximal arch vessels appear unremarkable. Mediastinum/Katie: No pathologically enlarged mediastinal lymph nodes are seen. The esophagus is unremarkable. Lung parenchyma: Left upper lobe granuloma. Lungs appear otherwise adequately aerated. No acute infiltrate is seen. No suspicious pulmonary nodules are seen. Trachea and airways: The trachea and central airways appear unremarkable. Pleural space: No significant pleural effusion or pneumothorax. Heart and pericardium: The heart and pericardium appear unremarkable. Chest wall: No acute or suspicious osseous or soft tissue lesion is identified. Upper abdomen: No acute abnormality is identified within the visualized upper abdomen. There are several subcentimeter liver hypodensities which are too small to characterize.     1.No acute abnormality is identified within the thorax. Specifically, there is no evidence of acute pulmonary embolism. 2.Please  see above for additional details. Electronically Signed: Cas Orozco MD  11/8/2024 11:35 AM EST  Workstation ID: [id number]    XR Chest 1 View    Result Date: 11/8/2024  XR CHEST 1 VW Date of Exam: 11/8/2024 10:54 AM EST Indication: SOA triage protocol Comparison: 11/4/2024 Findings: Low lung volumes. Heart size and pulmonary vasculature are within normal limits. Mild atelectasis and vascular crowding in the lung bases due to low lung volumes. No suspicious infiltrate or edema. Calcified granuloma in the peripheral left midlung. Costophrenic angle sharp     Impression: Hypoinspiratory film demonstrating no acute cardiopulmonary process Electronically Signed: Ranjith Garcia  11/8/2024 11:08 AM EST  Workstation ID: [id number]             Results for orders placed during the hospital encounter of 11/08/24    Adult Transthoracic Echo Complete w/ Color, Spectral and Contrast (If Necessary Per Protocol)    Interpretation Summary    Left ventricular systolic function is normal. Calculated left ventricular 3D EF = 63% Left ventricular ejection fraction appears to be 61 - 65%.    Trace mitral valve regurgitation is present.    Normal left atrial size and volume noted.      Plan for Follow-up of Pending Labs/Results:     Discharge Details        Discharge Medications        New Medications        Instructions Start Date   budesonide-formoterol 160-4.5 MCG/ACT inhaler  Commonly known as: SYMBICORT   2 puffs, Inhalation, 2 Times Daily - RT      diazePAM 5 MG tablet  Commonly known as: VALIUM   5 mg, Oral, Every 6 Hours Scheduled      gabapentin 100 MG capsule  Commonly known as: NEURONTIN   100 mg, Oral, 3 Times Daily      ipratropium 0.06 % nasal spray  Commonly known as: ATROVENT   2 sprays, Nasal, 4 Times Daily             Changes to Medications        Instructions Start Date   albuterol sulfate  (90 Base) MCG/ACT inhaler  Commonly known as: PROVENTIL HFA;VENTOLIN HFA;PROAIR HFA  What changed: reasons to take this   2  puffs, Inhalation, Every 4 Hours PRN             Continue These Medications        Instructions Start Date   12 Hour Nasal Decongestant 120 MG 12 hr tablet  Generic drug: pseudoephedrine   120 mg, Oral, 2 Times Daily      benzonatate 100 MG capsule  Commonly known as: TESSALON   200 mg, Oral, 3 Times Daily PRN      clotrimazole 10 MG alma delia  Commonly known as: MYCELEX   10 mg, Oral, 5 Times Daily      fluconazole 200 MG tablet  Commonly known as: DIFLUCAN   200 mg, Oral, Daily, Take 2 tablets (400 mg) on day one then one tablet (200 mg) on days 2 to 14.      pantoprazole 40 MG EC tablet  Commonly known as: PROTONIX   40 mg, Oral, 2 Times Daily      promethazine-codeine 6.25-10 MG/5ML solution  Commonly known as: PHENERGAN with CODEINE   5 mL, Oral, Every 4 Hours PRN      Sore Throat Spray 1.4 % liquid liquid  Generic drug: phenol   1 spray, Mouth/Throat, Every 2 Hours PRN               No Known Allergies      Discharge Disposition:  Home or Self Care    Diet:  Hospital:  Diet Order   Procedures    NPO Diet NPO Type: Strict NPO       Diet Instructions       Diet: Regular/House Diet; Regular (IDDSI 7); Thin (IDDSI 0)      Discharge Diet: Regular/House Diet    Texture: Regular (IDDSI 7)    Fluid Consistency: Thin (IDDSI 0)          Regular Diet.             Activity:  Activity Instructions        As tolerated..           Restrictions or Other Recommendations:         CODE STATUS:    Code Status and Medical Interventions: CPR (Attempt to Resuscitate); Full Support   Ordered at: 11/15/24 1355     Code Status (Patient has no pulse and is not breathing):    CPR (Attempt to Resuscitate)     Medical Interventions (Patient has pulse or is breathing):    Full Support       Future Appointments   Date Time Provider Department Center   11/19/2024 12:00 PM Hilda Beckett PA [facility]   1/7/2025  8:45 AM Daron Mccarthy MD [facility]   2/5/2025 10:00 AM Kelsey Ramsay APRN [facility]        Additional Instructions for the Follow-ups that You Need to Schedule       Discharge Follow-up with PCP   As directed       Currently Documented PCP:    Hilda Beckett PA    PCP Phone Number:    [phone]     Follow Up Details: in one week with PCP        Discharge Follow-up with Specified Provider: [facility]; 1 Month   As directed      To: [facility]   Follow Up: 1 Month   Follow Up Details: for colonoscopy        Discharge Follow-up with Specified Provider: ENT   As directed      To: ENT   Follow Up Details: as previously arranged        Discharge Follow-up with Specified Provider: Pulmonary; 2 Weeks   As directed      To: Pulmonary   Follow Up: 2 Weeks   Follow Up Details: (or as per Dr. Espinoza)                      Natalie Comer MD  11/17/24      Time Spent on Discharge:  I spent  35 minutes on this discharge activity which included: face-to-face encounter with the patient, reviewing the data in the system, coordination of the care with the nursing staff as well as consultants, documentation, and entering orders.

## 2024-11-17 NOTE — PLAN OF CARE
Problem: Adult Inpatient Plan of Care  Goal: Plan of Care Review  Outcome: Met  Goal: Patient-Specific Goal (Individualized)  Outcome: Met  Goal: Absence of Hospital-Acquired Illness or Injury  Outcome: Met  Intervention: Identify and Manage Fall Risk  Recent Flowsheet Documentation  Taken 11/17/2024 0800 by Claudia Harris RN  Safety Promotion/Fall Prevention:   activity supervised   safety round/check completed  Intervention: Prevent Skin Injury  Recent Flowsheet Documentation  Taken 11/17/2024 0800 by Claudia Harris RN  Body Position: position changed independently  Skin Protection:   incontinence pads utilized   transparent dressing maintained   skin sealant/moisture barrier applied  Goal: Optimal Comfort and Wellbeing  Outcome: Met  Goal: Readiness for Transition of Care  Outcome: Met     Problem: Syncope  Goal: Absence of Syncopal Symptoms  Outcome: Met  Intervention: Manage Effect of Syncopal Symptoms  Recent Flowsheet Documentation  Taken 11/17/2024 0800 by Claudia Harris RN  Safety Promotion/Fall Prevention:   activity supervised   safety round/check completed   Goal Outcome Evaluation:

## 2024-11-17 NOTE — OUTREACH NOTE
Prep Survey      Flowsheet Row Responses   [facility] patient discharged from? [place]   Is LACE score < 7 ? Yes   Eligibility [facility]   Date of Admission 11/15/24   Date of Discharge 11/17/24   Discharge Disposition Home or Self Care   Discharge diagnosis Syncope   Does the patient have one of the following disease processes/diagnoses(primary or secondary)? Other   Does the patient have Home health ordered? No   Is there a DME ordered? No   Prep survey completed? Yes            JOHANNA SINGLETARY - Registered Nurse

## 2024-11-18 ENCOUNTER — TRANSITIONAL CARE MANAGEMENT TELEPHONE ENCOUNTER (OUTPATIENT)
Dept: CALL CENTER | Facility: HOSPITAL | Age: 49
End: 2024-11-18
Payer: COMMERCIAL

## 2024-11-18 DIAGNOSIS — J38.3 VOCAL CORD DYSFUNCTION: Primary | ICD-10-CM

## 2024-11-18 DIAGNOSIS — R05.3 CHRONIC COUGH: ICD-10-CM

## 2024-11-18 NOTE — OUTREACH NOTE
Call Center TCM Note      Flowsheet Row Responses   [facility] patient discharged from? [place]   Does the patient have one of the following disease processes/diagnoses(primary or secondary)? Other   TCM attempt successful? No   Unsuccessful attempts Attempt 1  [Kamila on verbal release-no answer]            Raya Gaxiola RN    11/18/2024, 10:31 EST

## 2024-11-18 NOTE — OUTREACH NOTE
Call Center TCM Note      Flowsheet Row Responses   [facility] patient discharged from? [place]   Does the patient have one of the following disease processes/diagnoses(primary or secondary)? Other   TCM attempt successful? Yes   Call start time 1202   Call end time 1204   Discharge diagnosis Syncope   Meds reviewed with patient/caregiver? Yes   Is the patient having any side effects they believe may be caused by any medication additions or changes? No   Does the patient have all medications ordered at discharge? Yes   Is the patient taking all medications as directed (includes completed medication regime)? Yes   Comments HOSP DC FU appt 11/19/24 12 pm.   Does the patient have an appointment with their PCP within 7-14 days of discharge? Yes   Has home health visited the patient within 72 hours of discharge? N/A   Psychosocial issues? No   Did the patient receive a copy of their discharge instructions? Yes   Nursing interventions Reviewed instructions with patient   What is the patient's perception of their health status since discharge? Improving   Is the patient/caregiver able to teach back signs and symptoms related to disease process for when to call PCP? Yes   Is the patient/caregiver able to teach back signs and symptoms related to disease process for when to call 911? Yes   Is the patient/caregiver able to teach back the hierarchy of who to call/visit for symptoms/problems? PCP, Specialist, Home health nurse, Urgent Care, ED, 911 Yes   If the patient is a current smoker, are they able to teach back resources for cessation? [id number]   TCM call completed? Yes   Call end time 1204            Raya Gaxiola RN    11/18/2024, 12:05 EST

## 2024-11-19 ENCOUNTER — OFFICE VISIT (OUTPATIENT)
Dept: FAMILY MEDICINE CLINIC | Facility: CLINIC | Age: 49
End: 2024-11-19
Payer: COMMERCIAL

## 2024-11-19 VITALS
OXYGEN SATURATION: 97 % | RESPIRATION RATE: 12 BRPM | SYSTOLIC BLOOD PRESSURE: 102 MMHG | TEMPERATURE: 97.2 F | WEIGHT: 242 LBS | HEART RATE: 67 BPM | HEIGHT: 71 IN | DIASTOLIC BLOOD PRESSURE: 62 MMHG | BODY MASS INDEX: 33.88 KG/M2

## 2024-11-19 DIAGNOSIS — Z09 HOSPITAL DISCHARGE FOLLOW-UP: Primary | ICD-10-CM

## 2024-11-19 DIAGNOSIS — J38.5 LARYNGEAL SPASM: ICD-10-CM

## 2024-11-19 DIAGNOSIS — R55 SYNCOPE AND COLLAPSE: ICD-10-CM

## 2024-11-19 LAB
CYTO UR: NORMAL
LAB AP CASE REPORT: NORMAL
LAB AP CLINICAL INFORMATION: NORMAL
PATH REPORT.FINAL DX SPEC: NORMAL
PATH REPORT.GROSS SPEC: NORMAL

## 2024-11-19 NOTE — PROGRESS NOTES
Subjective   Hussain Feliz is a 49 y.o. male.     History of Present Illness   History of Present Illness  The patient presents for evaluation of laryngeal spasms. He is accompanied by his wife     He experiences fainting episodes 2 to 3 times daily, lasting approximately 45 seconds, during which he turns blue. These episodes have significantly impacted his life, preventing him from driving and working. His first fainting episode occurred on a Saturday morning, following a severe coughing fit. He was taken to the ER, where he underwent an EKG and cardiac workup. A continuous EEG was performed, during which he had two episodes. Despite quitting smoking three weeks ago, his cough persists. He feels tired and experiences lightheadedness when he coughs. He has not noticed any palpitations or irregular beats.    He has undergone extensive diagnostic procedures, including an upper GI, stress test, echocardiogram, and a 2-week Holter monitor. He was not on any acid reducers prior to the onset of his symptoms. A chest x-ray revealed possible pneumonia, for which he was prescribed antibiotics. Despite these treatments, his episodes continued throughout the week. He has been referred to an ENT specialist, with an appointment scheduled for 12/12/2024. His symptoms are thought to be triggered by a recent viral illness. He is scheduled for a sleep study in 02/2025.    He has been diagnosed with possible vocal cord dysfunction, causing his vocal cords to close when they should not, leading to difficulty breathing. He has a small ulcer at the bottom of his esophagus. He has a history of acid reflux and occasional smoking. He has been prescribed gabapentin 100 mg three times a day and Valium. He has not taken Valium since yesterday. He has been taking Phenergan DM, which makes him sleepy but does not alleviate his cough. He has been taking hydrocodone at night for neck soreness, which has been effective. He has been avoiding  "ibuprofen and Tylenol. He experiences more throat drainage when lying flat, which irritates his throat.    He has a history of bronchitis and a fractured nose from a fall, which has affected his breathing. He has had surgery in the past and currently experiences neck soreness. He was given a lidocaine nebulizer to calm his cough and an inhaler.    SOCIAL HISTORY  He used to smoke.   11/8-11/12 hospital admission  Consult with pulmonology, ENT, cardiology, and neurology while in hospital   Course during hospital stay:   \"Hussain Feliz is a 49 y.o. male with no significant PMHx presents with episodes of syncope and disorientation in the setting of a viral infection/bronchitis being treated with abx and steroids   Vocal cord dysfunction w/ syncope  Chronic cough  Tobacco use  --Patient underwent extensive evaluation upon arrival with CTA chest, stress test, echocardiogram, telemetry monitoring, EEG - all negative for etiology of symptoms.   --ENT and pulmonary were then consulted - flexible laryngoscopy was performed showing mild erythema/edema and moderate collapse/spasm of laryngeal walls. Symptoms ultimately felt to be due to vocal cord dysfunction. Treatment of underlying etiology was recommended.  --Add PPI at d/c and discussed nonmedical treatments of GERD with patient/family. Refer to [facility] as outpt for surveillance endoscopies.  --Home with decongestant, cough suppression. No driving while still having these events.  --Holter monitor to be applied prior to d/c.\"    The following portions of the patient's history were reviewed and updated as appropriate: allergies, current medications, past family history, past medical history, past social history, past surgical history, and problem list.    Review of Systems  As noted per HPI     Objective   Blood pressure 102/62, pulse 67, temperature 97.2 °F (36.2 °C), resp. rate 12, height 180.3 cm (71\"), weight 110 kg (242 lb), SpO2 97%.   Physical Exam  Vitals " reviewed.   Constitutional:       Appearance: Normal appearance.   Cardiovascular:      Rate and Rhythm: Normal rate and regular rhythm.   Pulmonary:      Effort: Pulmonary effort is normal.      Breath sounds: Normal breath sounds.      Comments: Hacking cough   Neurological:      Mental Status: He is alert and oriented to person, place, and time.   Psychiatric:         Mood and Affect: Mood normal.         Behavior: Behavior normal.         Results  Testing  EEG showed no seizure activity. Upper GI showed some inflammation. Holter monitor showed no arrhythmia issues.    Assessment & Plan   Assessment & Plan  1. Laryngeal spasms.  The patient's symptoms suggest a possible combination of factors. Additionally, underlying sleep apnea could contribute to obstruction. The goal is to manage these symptoms while awaiting further specialist evaluation. A low dose of Elavil (25 mg) will be added to his regimen, to be taken an hour before bedtime. The gabapentin dosage will be increased to 200 mg, to be taken three times a day. He is advised to avoid triggers and to use Valium as needed. He is also advised to avoid caffeine and soda while wearing the Holter monitor. Official documentation for short-term disability will be provided. If the increased gabapentin dosage proves beneficial, refills will be provided. If it causes drowsiness, the higher dose can be taken in the morning upon waking.    2. Chronic cough.  The patient continues to experience a chronic cough, which has not improved with the current acid reducer. He is advised to continue avoiding smoking and to maintain a bland diet to minimize potential triggers. The patient has been prescribed gabapentin 100 mg three times a day, which will be increased to 200 mg three times a day. If the cough persists, further evaluation by a pulmonologist is warranted.    3. Syncope.  The patient has experienced multiple episodes of syncope, some lasting up to 45 seconds and  resulting in turning blue. He has undergone extensive testing, including a continuous EEG, upper GI, stress test, echocardiogram, and Holter monitor. The patient is advised to avoid driving and working until further notice. He is also advised to sleep in a recliner to minimize the risk of injury during syncope episodes. A follow-up with an ENT specialist is scheduled for December 12.    4. Nasal fracture.  The patient sustained a nasal fracture during one of the syncope episodes. He reports that the pain is manageable, but his breathing is not normal. He will undergo nasal surgery once his other symptoms are under control.    Follow-up  Return in 2 weeks for follow-up.     Diagnoses and all orders for this visit:    1. Hospital discharge follow-up (Primary)    2. Syncope and collapse    3. Laryngeal spasm  -     amitriptyline (ELAVIL) 25 MG tablet; Take 1 tablet by mouth Every Night.  Dispense: 30 tablet; Refill: 2               Patient or patient representative verbalized consent for the use of Ambient Listening during the visit with  JEANNINE Marie for chart documentation. 11/19/2024  12:09 EST

## 2024-11-27 DIAGNOSIS — Z12.11 SCREEN FOR COLON CANCER: Primary | ICD-10-CM

## 2024-11-27 RX ORDER — SODIUM, POTASSIUM,MAG SULFATES 17.5-3.13G
SOLUTION, RECONSTITUTED, ORAL ORAL
Qty: 354 ML | Refills: 0 | Status: SHIPPED | OUTPATIENT
Start: 2024-11-27

## 2024-12-02 ENCOUNTER — OUTSIDE FACILITY SERVICE (OUTPATIENT)
Dept: GASTROENTEROLOGY | Facility: CLINIC | Age: 49
End: 2024-12-02
Payer: COMMERCIAL

## 2024-12-02 PROCEDURE — 45385 COLONOSCOPY W/LESION REMOVAL: CPT | Performed by: INTERNAL MEDICINE

## 2024-12-03 ENCOUNTER — OFFICE VISIT (OUTPATIENT)
Dept: FAMILY MEDICINE CLINIC | Facility: CLINIC | Age: 49
End: 2024-12-03
Payer: COMMERCIAL

## 2024-12-03 VITALS
HEART RATE: 77 BPM | SYSTOLIC BLOOD PRESSURE: 124 MMHG | WEIGHT: 242 LBS | HEIGHT: 71 IN | BODY MASS INDEX: 33.88 KG/M2 | TEMPERATURE: 97.1 F | RESPIRATION RATE: 14 BRPM | OXYGEN SATURATION: 96 % | DIASTOLIC BLOOD PRESSURE: 80 MMHG

## 2024-12-03 DIAGNOSIS — K21.9 GASTROESOPHAGEAL REFLUX DISEASE, UNSPECIFIED WHETHER ESOPHAGITIS PRESENT: ICD-10-CM

## 2024-12-03 DIAGNOSIS — B37.81 CANDIDAL ESOPHAGITIS: ICD-10-CM

## 2024-12-03 DIAGNOSIS — J38.5 LARYNGEAL SPASM: Primary | ICD-10-CM

## 2024-12-03 DIAGNOSIS — J38.5 LARYNGEAL SPASM: ICD-10-CM

## 2024-12-03 PROCEDURE — 99213 OFFICE O/P EST LOW 20 MIN: CPT | Performed by: PHYSICIAN ASSISTANT

## 2024-12-03 RX ORDER — GABAPENTIN 100 MG/1
200 CAPSULE ORAL 2 TIMES DAILY
Qty: 120 CAPSULE | Refills: 2 | Status: SHIPPED | OUTPATIENT
Start: 2024-12-03

## 2024-12-03 RX ORDER — PANTOPRAZOLE SODIUM 40 MG/1
40 TABLET, DELAYED RELEASE ORAL 2 TIMES DAILY
Qty: 180 TABLET | Refills: 3 | Status: SHIPPED | OUTPATIENT
Start: 2024-12-03

## 2024-12-03 NOTE — PROGRESS NOTES
"Subjective   Hussain Feliz is a 49 y.o. male.     History of Present Illness   History of Present Illness  The patient presents for evaluation of multiple medical concerns.    He continues to experience a cough and choking sensation, which has increased slightly since his last visit two weeks ago. He has not fainted during this period. The choking sensation is becoming more frequent, even occurring during activities such as brushing his teeth. He reports no throat drainage or soreness. He has not yet started the prescribed amitriptyline. He has appointments scheduled with a pulmonologist tomorrow and an ENT specialist on Thursday.    He reports feeling congested after breaking his nose. He has returned to work and is able to drive without experiencing anxiety.    He is currently taking gabapentin 200 mg at bedtime. Initially, he took two tablets, which caused excessive sleepiness, so he reduced the dosage. He is now taking one tablet at night and two during the day. He has run out of his acid reflux medication, which he was taking twice daily. No more episodes of syncope since last visit     He reports that his neck feels inflamed and sore, and he experiences pain when moving in certain ways or coughing. He underwent a colonoscopy yesterday.       The following portions of the patient's history were reviewed and updated as appropriate: allergies, current medications, past family history, past medical history, past social history, past surgical history, and problem list.    Review of Systems  As noted per HPI     Objective   Blood pressure 124/80, pulse 77, temperature 97.1 °F (36.2 °C), resp. rate 14, height 180.3 cm (71\"), weight 110 kg (242 lb), SpO2 96%. Body mass index is 33.75 kg/m².   Physical Exam  Vitals reviewed.   Constitutional:       Appearance: Normal appearance.   Cardiovascular:      Rate and Rhythm: Normal rate and regular rhythm.   Pulmonary:      Effort: Pulmonary effort is normal.      Breath " sounds: Normal breath sounds.   Neurological:      Mental Status: He is alert and oriented to person, place, and time.   Psychiatric:         Mood and Affect: Mood normal.         Behavior: Behavior normal.         Results      Assessment & Plan   Assessment & Plan  1. Cough.  The cough could be a result of ongoing esophagitis due to reflux. There is no mucus production, and the patient reports increased choking and coughing. No additional medication will be prescribed for the cough at this time. The patient has a pulmonology appointment tomorrow for further evaluation, including potential updated pulmonary function tests and imaging. If there is any infection, appropriate treatment will be considered.    2. Presyncope.  The patient reports no recent episodes of passing out but mentions feeling choked easily. Gabapentin 200 mg at bedtime is helping to prevent passing out. The patient is advised to continue taking gabapentin 200 mg at bedtime. A refill for gabapentin will be sent to the pharmacy to ensure the patient does not run out of medication. The patient is advised to monitor for fatigue and adjust the timing of the dose if necessary.    3. Acid reflux.  The patient reports running out of pantoprazole and has been taking it twice a day. A refill for pantoprazole will be sent to the pharmacy to manage ongoing esophagitis and reflux symptoms.    4. ENT evaluation.  The patient has an ENT appointment on Thursday for septum evaluation and another ENT visit on the 12th to address potential vocal cord dysfunction. The patient is advised to keep the appointments and update the provider on the outcomes.       Diagnoses and all orders for this visit:    1. Laryngeal spasm (Primary)    2. Candidal esophagitis  -     pantoprazole (PROTONIX) 40 MG EC tablet; Take 1 tablet by mouth 2 (Two) Times a Day.  Dispense: 180 tablet; Refill: 3    3. Gastroesophageal reflux disease, unspecified whether esophagitis present  -      pantoprazole (PROTONIX) 40 MG EC tablet; Take 1 tablet by mouth 2 (Two) Times a Day.  Dispense: 180 tablet; Refill: 3               Patient or patient representative verbalized consent for the use of Ambient Listening during the visit with  JEANNINE Marie for chart documentation. 12/22/2024  12:43 EST

## 2024-12-04 ENCOUNTER — OFFICE VISIT (OUTPATIENT)
Dept: PULMONOLOGY | Facility: CLINIC | Age: 49
End: 2024-12-04
Payer: COMMERCIAL

## 2024-12-04 VITALS
HEIGHT: 71 IN | TEMPERATURE: 97.9 F | WEIGHT: 247 LBS | HEART RATE: 70 BPM | SYSTOLIC BLOOD PRESSURE: 120 MMHG | DIASTOLIC BLOOD PRESSURE: 78 MMHG | OXYGEN SATURATION: 96 % | BODY MASS INDEX: 34.58 KG/M2

## 2024-12-04 DIAGNOSIS — R05.3 CHRONIC COUGH: Primary | ICD-10-CM

## 2024-12-04 PROCEDURE — 99214 OFFICE O/P EST MOD 30 MIN: CPT | Performed by: INTERNAL MEDICINE

## 2024-12-04 RX ORDER — BUDESONIDE AND FORMOTEROL FUMARATE DIHYDRATE 160; 4.5 UG/1; UG/1
2 AEROSOL RESPIRATORY (INHALATION)
Qty: 10.2 G | Refills: 2 | Status: SHIPPED | OUTPATIENT
Start: 2024-12-04

## 2024-12-04 NOTE — PROGRESS NOTES
"Pulmonary Office Follow Up      Subjective   Chief Complaint: Cough    Hussain Feliz is a 49 y.o. male is being seen in follow up for Hospital Follow up.     History of Present Illness    Mr. Feliz is a 50yo M with a history of GERD who was recently admitted to [facility] for intermittent episodes of difficulty breathing associated with syncopal events. He was felt to have GERD and vocal cord dysfunction and was discharged with recommendations for reflux precautions and an outpatient sleep study.      He returned to [facility] on 11/15 after multiple syncopal episodes resulting in trauma, bruising and a nasal fracture.      He was admitted to Hospital Medicine.      Cardiology evaluated him and he was wearing a holter monitor. No episodes since admission and telemetry was unremarkable.      He was evaluated again by ENT who felt that he may be have some subglottic tracheal narrowing with coughing and recommended repeat Pulmonary Evaluation.     He was discharged with Pulmonary Follow up.     He returns to clinic today for follow up. He feels that overall his cough is better but it has returned some over the past couple of days. He has not been using the Symbicort inhaler. The last time he had a syncopal episode with coughing was a week before Thanksgiving.     The following portions of the patient's history were reviewed and updated as appropriate: allergies, current medications, past family history, past medical history, past social history, past surgical history and problem list.    Review of Systems  All other systems were reviewed and are negative.  Exceptions are noted in the HPI or above.      Objective   Blood pressure 120/78, pulse 70, temperature 97.9 °F (36.6 °C), height 180.3 cm (71\"), weight 112 kg (247 lb), SpO2 96%.  Physical Exam  Vitals and nursing note reviewed.   Constitutional:       General: He is not in acute distress.     Appearance: He is well-developed.   HENT:      Head: Normocephalic and atraumatic. "   Eyes:      General: No scleral icterus.     Conjunctiva/sclera: Conjunctivae normal.      Pupils: Pupils are equal, round, and reactive to light.   Neck:      Thyroid: No thyromegaly.      Trachea: No tracheal deviation.   Cardiovascular:      Rate and Rhythm: Normal rate and regular rhythm.      Heart sounds: Normal heart sounds.   Pulmonary:      Effort: Pulmonary effort is normal. No respiratory distress.      Breath sounds: Normal breath sounds.   Abdominal:      General: Bowel sounds are normal.      Palpations: Abdomen is soft.      Tenderness: There is no abdominal tenderness.   Musculoskeletal:         General: Normal range of motion.      Cervical back: Normal range of motion and neck supple.   Lymphadenopathy:      Cervical: No cervical adenopathy.   Skin:     General: Skin is warm and dry.      Findings: No erythema or rash.   Neurological:      Mental Status: He is alert and oriented to person, place, and time.      Motor: No abnormal muscle tone.      Coordination: Coordination normal.   Psychiatric:         Speech: Speech normal.         Behavior: Behavior normal.         Judgment: Judgment normal.         PFTs:  Performed in clinic and personally reviewed.   There is no airway obstruction.  The lung volumes are normal.  The DLCO is normal.     Imaging:  No new imaging.     Assessment & Plan   Diagnoses and all orders for this visit:    1. Chronic cough (Primary)    Other orders  -     budesonide-formoterol (SYMBICORT) 160-4.5 MCG/ACT inhaler; Inhale 2 puffs 2 (Two) Times a Day.  Dispense: 10.2 g; Refill: 2        Discussion:  Mr. Feliz is a 48yo M who is followed for cough.     1. Chronic Cough  - Discussed the 3 most common etiologies which include asthma/COPD, upper airway cough syndrome and GERD.   - PFTs are normal though he may have some mild flattening of his inspiratory loop.   - He has an appointment with Dr. oTmlin at  [facility] on [facility]12 for further evaluation of cough and possible subglottic  stenosis with coughing.   - Continue Gabapentin.   - Resume Symbicort. Refill sent to pharmacy and I have given him a spacer to use.   - Discussed that I would like to maximally treat all possible etiologies for cough and once his cough is better/resolved, we can start trying to eliminate medications that are no longer needed.     2. GERD  - Continue PPI  - Reflux precautions.     3. Possible GREGOR  - Consider a sleep study after cough has improved.     Follow up in 1 month.        Hussain Chuy Tray  reports that he quit smoking about 5 weeks ago. His smoking use included cigarettes. He has never used smokeless tobacco. I have educated him on the risk of diseases from using tobacco products such as cancer, COPD, and heart disease.     I advised him to quit and he is not willing to quit.    I spent 3  minutes counseling the patient.           Kaity Espinoza, DO  Pulmonary and Critical Care Medicine  Note Electronically Signed

## 2024-12-06 NOTE — PROCEDURES
11/15/2024     Pre-op diagnosis: airway obstruction   Postop diagnosis: same with evidence of laryngeal edema   Procedure flexible laryngoscopy :  endoscopic exam: Topical Afrin and lidocaine was applied to the nasal cavity bilaterally.  The left septum did have an area of recent laceration with displacement but no hematoma formation.  The nasopharynx appears healthy hypopharynx and larynx did not reveal any significant edema of the laryngeal surface or abnormal vocal cord mobility.  However, when he began to cough there was noted narrowing of the subglottic trachea to the point of causing severe stridor.  This did pass once he was breathing more clearly.

## 2024-12-12 ENCOUNTER — TELEPHONE (OUTPATIENT)
Dept: CARDIOLOGY | Facility: CLINIC | Age: 49
End: 2024-12-12

## 2024-12-12 LAB
CV ZIO BASELINE AVG BPM: 69 BPM
CV ZIO BASELINE BPM HIGH: 162 BPM
CV ZIO BASELINE BPM LOW: 46 BPM
CV ZIO DEVICE ANALYSIS TIME: NORMAL
CV ZIO ECT SVE COUNT: 425 EPISODES
CV ZIO ECT SVE CPLT COUNT: 0 EPISODES
CV ZIO ECT SVE CPLT FREQ: 0
CV ZIO ECT SVE FREQ: NORMAL
CV ZIO ECT SVE TPLT COUNT: 0 EPISODES
CV ZIO ECT SVE TPLT FREQ: 0
CV ZIO ECT VE COUNT: 125 EPISODES
CV ZIO ECT VE CPLT COUNT: 0 EPISODES
CV ZIO ECT VE CPLT FREQ: 0
CV ZIO ECT VE FREQ: NORMAL
CV ZIO ECT VE TPLT COUNT: 0 EPISODES
CV ZIO ECT VE TPLT FREQ: 0
CV ZIO ECTOPIC SVE COUPLET RAW PERCENT: 0 %
CV ZIO ECTOPIC SVE ISOLATED PERCENT: 0.03 %
CV ZIO ECTOPIC SVE TRIPLET RAW PERCENT: 0 %
CV ZIO ECTOPIC VE COUPLET RAW PERCENT: 0 %
CV ZIO ECTOPIC VE ISOLATED PERCENT: 0.01 %
CV ZIO ECTOPIC VE TRIPLET RAW PERCENT: 0 %
CV ZIO ENROLLMENT END: NORMAL
CV ZIO ENROLLMENT START: NORMAL
CV ZIO PATIENT EVENTS DIARIES: 10
CV ZIO PATIENT EVENTS TRIGGERS: 2
CV ZIO PAUSE COUNT: 0
CV ZIO PRESCRIPTION STATUS: NORMAL
CV ZIO SVT AVG BPM: 121 BPM
CV ZIO SVT BPM HIGH: 162 BPM
CV ZIO SVT BPM LOW: 83 BPM
CV ZIO SVT COUNT: 3
CV ZIO SVT F EPI AVG BPM: 147 BPM
CV ZIO SVT F EPI BEATS: 5 BEATS
CV ZIO SVT F EPI BPM HIGH: 162 BPM
CV ZIO SVT F EPI BPM LOW: 121 BPM
CV ZIO SVT F EPI DUR: 2.2 SEC
CV ZIO SVT F EPI END: NORMAL
CV ZIO SVT F EPI START: NORMAL
CV ZIO SVT L EPI AVG BPM: 102 BPM
CV ZIO SVT L EPI BEATS: 6 BEATS
CV ZIO SVT L EPI BPM HIGH: 112 BPM
CV ZIO SVT L EPI BPM LOW: 83 BPM
CV ZIO SVT L EPI DUR: 3.6 SEC
CV ZIO SVT L EPI END: NORMAL
CV ZIO SVT L EPI START: NORMAL
CV ZIO TOTAL  ENROLLMENT PERIOD: NORMAL
CV ZIO VT COUNT: 0

## 2024-12-12 NOTE — TELEPHONE ENCOUNTER
----- Message from Daron Mccarthy sent at 12/12/2024 10:52 AM EST -----  No arrhythmia noted to cause syncope

## 2024-12-12 NOTE — TELEPHONE ENCOUNTER
Pt returned call. Informed him what Dr. Mccarthy said about results. Pt verbalized understanding and had no further questions.

## 2025-01-09 ENCOUNTER — OFFICE VISIT (OUTPATIENT)
Dept: CARDIOLOGY | Facility: CLINIC | Age: 50
End: 2025-01-09
Payer: COMMERCIAL

## 2025-01-09 VITALS
HEIGHT: 71 IN | DIASTOLIC BLOOD PRESSURE: 98 MMHG | HEART RATE: 64 BPM | OXYGEN SATURATION: 96 % | WEIGHT: 257.6 LBS | SYSTOLIC BLOOD PRESSURE: 130 MMHG | BODY MASS INDEX: 36.06 KG/M2

## 2025-01-09 DIAGNOSIS — J38.3 VOCAL CORD DYSFUNCTION: ICD-10-CM

## 2025-01-09 DIAGNOSIS — R55 VASOVAGAL SYNCOPE: Primary | ICD-10-CM

## 2025-01-09 DIAGNOSIS — R05.3 CHRONIC COUGH: ICD-10-CM

## 2025-01-09 NOTE — PROGRESS NOTES
[facility] Cardiology  Office Note  Hussain Feliz  1975  [address]  [facility] [address]     VISIT DATE:  01/09/25    PCP: Hilda Beckett  [address]  [facility] [id number]    CC: Syncope  Chief Complaint   Patient presents with    syncope      Pt denies CP, SOA, dizziness, edema       PROBLEM LIST:    Recurrent syncope thought to be due to vocal cord issues/chronic cough  Unremarkable exercise stress test November 2024  Unremarkable echocardiogram November 2024  Unremarkable Holter monitor December 2024 during episodes of syncope    Allergies  No Known Allergies    Current Medications    Current Outpatient Medications:     budesonide-formoterol (SYMBICORT) 160-4.5 MCG/ACT inhaler, Inhale 2 puffs 2 (Two) Times a Day., Disp: 10.2 g, Rfl: 2    gabapentin (NEURONTIN) 100 MG capsule, Take 2 capsules by mouth 2 (Two) Times a Day., Disp: 120 capsule, Rfl: 2    pantoprazole (PROTONIX) 40 MG EC tablet, Take 1 tablet by mouth 2 (Two) Times a Day., Disp: 180 tablet, Rfl: 3     History of Present Illness   Hussain Feliz is a 49 y.o. year old male who presents for consult from No ref. provider found for evaluation of kidney.  Patient states his cough has improved overall.  Patient is not have further issues with this improving.  No chest pain pressure discomfort.  No shortness of breath.  Patient blood pressure at home runs 130s over 80s.  Patient states he is back to normal activity.  No dizziness.  No palpitations.  Patient otherwise without complaints or concerns otherwise at this time.    Pt denies any chest pain, dyspnea at rest, dyspnea on exertion, orthopnea, PND, palpitations, lower extremity edema, or claudication. Pt denies history of CHF, DVT, PE, MI, CVA, TIA, or rheumatic fever.     SOCIAL HX  Social History     Socioeconomic History    Marital status:     Number of children: 3   Tobacco Use    Smoking status: Former     Current packs/day: 0.00     Types:  "Cigarettes     Quit date: 10/28/2024     Years since quittin.2    Smokeless tobacco: Never    Tobacco comments:     1 pack per week   Vaping Use    Vaping status: Never Used   Substance and Sexual Activity    Alcohol use: Not Currently     Comment: maybe one drink week    Drug use: Never    Sexual activity: Yes     Partners: Female       FAMILY HX  Family History   Problem Relation Age of Onset    Hypertension Mother     Obesity Mother     Arthritis Mother     Arrhythmia Mother     Heart failure Mother     Asthma Father        Vitals:    25 0824   BP: 130/98   BP Location: Right arm   Patient Position: Sitting   Cuff Size: Adult   Pulse: 64   SpO2: 96%   Weight: 117 kg (257 lb 9.6 oz)   Height: 180.3 cm (71\")     Body mass index is 35.93 kg/m².     PHYSICAL EXAMINATION:  Vitals and nursing note reviewed.   Constitutional:       Appearance: Healthy appearance. Not in distress.   Eyes:      Conjunctiva/sclera: Conjunctivae normal.      Pupils: Pupils are equal, round, and reactive to light.   HENT:      Nose: Nose normal.    Mouth/Throat:      Pharynx: Oropharynx is clear.   Neck:      Vascular: JVD normal.   Pulmonary:      Effort: Pulmonary effort is normal.      Breath sounds: Normal breath sounds. No wheezing. No rhonchi. No rales.   Cardiovascular:      PMI at left midclavicular line. Normal rate. Regular rhythm. Normal S1. Normal S2.       Murmurs: There is no murmur.      No gallop.  No click. No rub.   Pulses:     Intact distal pulses.   Abdominal:      General: Bowel sounds are normal.      Palpations: Abdomen is soft.      Tenderness: There is no abdominal tenderness.   Musculoskeletal: Normal range of motion.         General: No tenderness.      Cervical back: Normal range of motion. Skin:     General: Skin is warm and dry.   Neurological:      General: No focal deficit present.      Mental Status: Alert and oriented to person, place and time.      Cranial Nerves: Cranial nerves 2-12 are intact. "         Diagnostic Data:  Lab Results   Component Value Date    CHLPL 154 08/29/2023    TRIG 100 08/29/2023    HDL 28 (L) 08/29/2023     Lab Results   Component Value Date    GLUCOSE 88 11/17/2024    BUN 11 11/17/2024    CREATININE 0.97 11/17/2024     11/17/2024    K 3.9 11/17/2024     11/17/2024    CO2 23.0 11/17/2024     Lab Results   Component Value Date    HGBA1C 4.9 08/29/2023     Lab Results   Component Value Date    WBC 7.29 11/17/2024    HGB 16.8 11/17/2024    HCT 47.2 11/17/2024     11/17/2024       Procedures    Advance Care Planning   ACP discussion was declined by the patient. Patient does not have an advance directive, declines further assistance.         ASSESSMENT:  Diagnoses and all orders for this visit:    1. Vasovagal syncope (Primary)    2. Chronic cough    3. Vocal cord dysfunction        PLAN:    Reassurance given today in discussion to symptomology.  Patient with rare SVT and no symptomology with such thus will not treat.  Will see patient back in 1 year for further risk factor stratification  Testing discussed with patient today.  Patient does not appear to have cardiac syncope.  Return in about 1 year (around 1/9/2026).     Daron Mccarthy MD

## 2025-02-05 ENCOUNTER — TELEMEDICINE (OUTPATIENT)
Dept: SLEEP MEDICINE | Facility: CLINIC | Age: 50
End: 2025-02-05
Payer: COMMERCIAL

## 2025-02-05 VITALS — BODY MASS INDEX: 35.42 KG/M2 | HEIGHT: 71 IN | WEIGHT: 253 LBS

## 2025-02-05 DIAGNOSIS — E66.9 OBESITY (BMI 30-39.9): ICD-10-CM

## 2025-02-05 DIAGNOSIS — G47.19 EXCESSIVE DAYTIME SLEEPINESS: ICD-10-CM

## 2025-02-05 DIAGNOSIS — J38.3 VOCAL CORD DYSFUNCTION: ICD-10-CM

## 2025-02-05 DIAGNOSIS — F51.04 PSYCHOPHYSIOLOGICAL INSOMNIA: ICD-10-CM

## 2025-02-05 DIAGNOSIS — R55 SYNCOPE, UNSPECIFIED SYNCOPE TYPE: Primary | ICD-10-CM

## 2025-02-05 DIAGNOSIS — G47.30 OBSERVED SLEEP APNEA: ICD-10-CM

## 2025-02-05 PROCEDURE — 99213 OFFICE O/P EST LOW 20 MIN: CPT | Performed by: NURSE PRACTITIONER

## 2025-02-05 NOTE — PROGRESS NOTES
Chief Complaint:   Chief Complaint   Patient presents with    Sleeping Problem       HPI:    Hussain Feliz is a 49 y.o. male here to establish care.  Patient sees Dr. Beckett and Dr. Bernardo for care.  Patient has medical history as below:  Past Medical History:   Diagnosis Date    Asthma     Deviated septum 11/14/2024    Heart burn     Hiatal hernia     Laryngospasm     Paradoxical vocal cord motion disorder     Pneumonia     Sleep apnea        Patient states for greater than 5 years he has had snoring, witnessed apneas, daytime sleepiness, frequent awakenings, nonrestorative sleep, heartburn, dry mouth, sore throat, airway abnormalities, leg and body jerks, leg cramping, difficulty falling and staying asleep and decreased libido.  He does have history of broken nose but denies head injury.  His recent problem has been syncope.  Patient states doctors are investigating vocal cord dysfunction that may be the cause.  He is here today to see if sleep apnea is playing a role in any of the symptoms.    During the weekday going to bed 1130 getting up at 7:30 AM and on the weekend going to bed at midnight getting up at 10 AM.  He estimates getting 6 hours of sleep nightly.  He will go to sleep within 1 hour and wife states he tosses and turns throughout the night.  He does have an [place] score of 13/24.  Sleep apnea risk factors:  Patient has symptoms Snoring, Restless sleep, Daytime sleepiness, Frequent arousal from sleep, Apnea, and syncope  suggestive of the presence of obstructive sleep apnea. The patient has the following co-morbid conditions of asthma and obese (BMI >30). We did speak today about the consequences of untreated sleep apnea worsening the previously listed co-morbidities and sudden cardiac death.  We also discussed different therapies available to him such as CPAP, MAD, or ENT referral.  Patient verbalizes understanding.    Social history  This is a very pleasant 49-year-old male.  He is a  "director.  He is a non-smoker and will have an alcoholic beverage monthly or less.  He has 3 sodas daily and 1 red bull energy drink daily.    Family History   Problem Relation Age of Onset    Hypertension Mother     Obesity Mother     Arthritis Mother     Arrhythmia Mother     Heart failure Mother     Asthma Father      Past Surgical History:   Procedure Laterality Date    COLONOSCOPY      ENDOSCOPY N/A 11/17/2024    Procedure: ESOPHAGOGASTRODUODENOSCOPY;  Surgeon: Brunner, Mark I, MD;  Location: [facility] ENDOSCOPY;  Service: Gastroenterology;  Laterality: N/A;    HEMORRHOIDECTOMY  2013    Dr. Stahl         Current medications are:   Current Outpatient Medications:     budesonide-formoterol (SYMBICORT) 160-4.5 MCG/ACT inhaler, Inhale 2 puffs 2 (Two) Times a Day., Disp: 10.2 g, Rfl: 2    gabapentin (NEURONTIN) 100 MG capsule, Take 2 capsules by mouth 2 (Two) Times a Day., Disp: 120 capsule, Rfl: 2    pantoprazole (PROTONIX) 40 MG EC tablet, Take 1 tablet by mouth 2 (Two) Times a Day., Disp: 180 tablet, Rfl: 3.      The patient's relevant past medical, surgical, family and social history were reviewed and updated in Epic as appropriate.       Review of Systems   HENT:  Positive for postnasal drip, sinus pressure, sore throat and trouble swallowing.    Respiratory:  Positive for apnea, cough and wheezing.    Neurological:  Positive for syncope.   Psychiatric/Behavioral:  Positive for sleep disturbance.    All other systems reviewed and are negative.        Objective:  Ht 180.3 cm (71\")   Wt 115 kg (253 lb)   BMI 35.29 kg/m²     Physical Exam  Constitutional:       Appearance: Normal appearance.   HENT:      Head: Normocephalic and atraumatic.   Pulmonary:      Effort: Pulmonary effort is normal. No respiratory distress.   Neurological:      Mental Status: He is alert and oriented to person, place, and time.   Psychiatric:         Mood and Affect: Mood normal.         Behavior: Behavior normal.         Thought " Content: Thought content normal.         Judgment: Judgment normal.             ASSESSMENT/PLAN    Diagnoses and all orders for this visit:    1. Syncope, unspecified syncope type (Primary)  -     Home Sleep Study; Future    2. Vocal cord dysfunction  -     Home Sleep Study; Future    3. Observed sleep apnea  -     Home Sleep Study; Future    4. Excessive daytime sleepiness  -     Home Sleep Study; Future    5. Psychophysiological insomnia  -     Home Sleep Study; Future    6. Obesity (BMI 30-39.9)  -     Home Sleep Study; Future        Patient certainly has a strong story for sleep apnea and due to the consequences of untreated sleep apnea we will move forward with HST and follow-up as appropriate.    Thank you for this kind referral.  The patient is located in [facility] at home . The patient presents today for telehealth service.  This service was conducted via audio/video technology through a secure [employer] video visit connection through Epic.  This provider is located in [facility].  Patient stated they are in a secure environment for the session.  Patient's condition being diagnosed/treated is appropriate for telemedicine.  The provider identified himself as well as his credentials.  The patient, and/or patient's guardian, consent to be seen remotely, and when consent is given they understanding that the consent allows for patient identifiable information to be sent to a third-party as needed.  They may refuse to be seen remotely at any time.  The electronic data is encrypted and password protected, and the patient and/or guardian has been advised of the potential risk to privacy not withstanding such measures.  Patient identifiers used: Name and date of birth.   I have reviewed the results of my evaluation and impression and discussed my recommendations in detail with the patient.      Signed by  IRENE Real    February 5, 2025      CC: Hilda Beckett PA [place]at[place], Tracy M II,  DO

## 2025-02-19 ENCOUNTER — TELEMEDICINE (OUTPATIENT)
Dept: FAMILY MEDICINE CLINIC | Facility: CLINIC | Age: 50
End: 2025-02-19
Payer: COMMERCIAL

## 2025-02-19 DIAGNOSIS — J06.9 ACUTE URI: Primary | ICD-10-CM

## 2025-02-19 DIAGNOSIS — R05.3 CHRONIC COUGH: ICD-10-CM

## 2025-02-19 PROCEDURE — 99213 OFFICE O/P EST LOW 20 MIN: CPT | Performed by: PHYSICIAN ASSISTANT

## 2025-02-19 RX ORDER — BENZONATATE 100 MG/1
100 CAPSULE ORAL 3 TIMES DAILY PRN
Qty: 30 CAPSULE | Refills: 2 | Status: SHIPPED | OUTPATIENT
Start: 2025-02-19

## 2025-02-19 RX ORDER — DOXYCYCLINE 100 MG/1
100 CAPSULE ORAL 2 TIMES DAILY
Qty: 20 CAPSULE | Refills: 0 | Status: SHIPPED | OUTPATIENT
Start: 2025-02-19

## 2025-02-19 RX ORDER — PREDNISONE 10 MG/1
TABLET ORAL
Qty: 21 EACH | Refills: 0 | Status: SHIPPED | OUTPATIENT
Start: 2025-02-19

## 2025-02-19 NOTE — PROGRESS NOTES
Subjective   Hussain Feliz is a 49 y.o. male.   You have chosen to receive care through a telehealth visit.  Do you consent to use a video/audio connection for your medical care today? Yes  Patient location: [place]   Provider location: [place]   Technology used: [employer] with video and audio connection     History of Present Illness   PT presents with cc of chronic cough iwith recent exacerbation. Notes family members have been sick and he is noticing more drainage, chest congestion and productive cough with this occurrence   Patient was seen by ENT last week on 2/13 and received a Superior Laryngeal Nerve Block injection to help with suspected neurogenic cough. No significant improvement of symptoms and has mild sore throat.   Former smoker   Cough used to be to the severity of syncopal episode   Has had thorough work up. Refer to records   Continues to take pantoprazole and gabapentin which has been beneficial  Has also been prescribed symbicort by Dr. Espinoza but patient notes he is no longer taking.  Recently started back yesterday with onset of worsening symptoms     The following portions of the patient's history were reviewed and updated as appropriate: allergies, current medications, past family history, past medical history, past social history, past surgical history, and problem list.    Review of Systems  As noted per HPI     Objective   There were no vitals taken for this visit.   Physical Exam  Constitutional:       Appearance: Normal appearance.   Pulmonary:      Effort: Pulmonary effort is normal.   Neurological:      Mental Status: He is alert and oriented to person, place, and time.   Psychiatric:         Mood and Affect: Mood normal.         Behavior: Behavior normal.         Thought Content: Thought content normal.         Judgment: Judgment normal.         Assessment & Plan   Diagnoses and all orders for this visit:    1. Acute URI (Primary)  -     doxycycline (VIBRAMYCIN) 100 MG capsule; Take 1 capsule by  mouth 2 (Two) Times a Day.  Dispense: 20 capsule; Refill: 0  -     predniSONE (DELTASONE) 10 MG (21) dose pack; Use as directed on package  Dispense: 21 each; Refill: 0    2. Chronic cough  -     benzonatate (Tessalon Perles) 100 MG capsule; Take 1 capsule by mouth 3 (Three) Times a Day As Needed for Cough.  Dispense: 30 capsule; Refill: 2    Broad spectrum coverage for URI with doxycyline and will start steroid pack to help with cough and inflammation   Continue with maintenance inhaler as recommended by pulmonology   Trial of:  Quercetin with bromelain   Organic Throat spray such as  Acacia Johnson with marshmellow root   Both OTC for chronic cough

## 2025-06-10 ENCOUNTER — HOSPITAL ENCOUNTER (OUTPATIENT)
Dept: SLEEP MEDICINE | Facility: HOSPITAL | Age: 50
Discharge: HOME | End: 2025-06-10
Admitting: NURSE PRACTITIONER
Payer: COMMERCIAL

## 2025-06-10 VITALS — HEIGHT: 71 IN | BODY MASS INDEX: 35.49 KG/M2 | WEIGHT: 253.53 LBS

## 2025-06-10 DIAGNOSIS — E66.9 OBESITY (BMI 30-39.9): ICD-10-CM

## 2025-06-10 DIAGNOSIS — F51.04 PSYCHOPHYSIOLOGICAL INSOMNIA: ICD-10-CM

## 2025-06-10 DIAGNOSIS — J38.3 VOCAL CORD DYSFUNCTION: ICD-10-CM

## 2025-06-10 DIAGNOSIS — R55 SYNCOPE, UNSPECIFIED SYNCOPE TYPE: ICD-10-CM

## 2025-06-10 DIAGNOSIS — G47.30 OBSERVED SLEEP APNEA: ICD-10-CM

## 2025-06-10 DIAGNOSIS — G47.19 EXCESSIVE DAYTIME SLEEPINESS: ICD-10-CM

## 2025-06-10 PROCEDURE — 95800 SLP STDY UNATTENDED: CPT

## 2025-06-18 ENCOUNTER — TELEPHONE (OUTPATIENT)
Dept: SLEEP MEDICINE | Facility: CLINIC | Age: 50
End: 2025-06-18
Payer: COMMERCIAL

## 2025-06-18 ENCOUNTER — OFFICE VISIT (OUTPATIENT)
Dept: FAMILY MEDICINE CLINIC | Facility: CLINIC | Age: 50
End: 2025-06-18
Payer: COMMERCIAL

## 2025-06-18 VITALS
OXYGEN SATURATION: 99 % | HEIGHT: 71 IN | WEIGHT: 253 LBS | TEMPERATURE: 97.3 F | BODY MASS INDEX: 35.42 KG/M2 | HEART RATE: 66 BPM | SYSTOLIC BLOOD PRESSURE: 120 MMHG | DIASTOLIC BLOOD PRESSURE: 80 MMHG

## 2025-06-18 DIAGNOSIS — R05.1 ACUTE COUGH: ICD-10-CM

## 2025-06-18 DIAGNOSIS — J06.9 ACUTE URI: Primary | ICD-10-CM

## 2025-06-18 PROCEDURE — 99213 OFFICE O/P EST LOW 20 MIN: CPT | Performed by: PHYSICIAN ASSISTANT

## 2025-06-18 RX ORDER — BROMPHENIRAMINE MALEATE, PSEUDOEPHEDRINE HYDROCHLORIDE, AND DEXTROMETHORPHAN HYDROBROMIDE 2; 30; 10 MG/5ML; MG/5ML; MG/5ML
5 SYRUP ORAL 4 TIMES DAILY PRN
Qty: 200 ML | Refills: 0 | Status: SHIPPED | OUTPATIENT
Start: 2025-06-18

## 2025-06-18 RX ORDER — BENZONATATE 100 MG/1
100 CAPSULE ORAL 3 TIMES DAILY PRN
Qty: 30 CAPSULE | Refills: 0 | Status: SHIPPED | OUTPATIENT
Start: 2025-06-18

## 2025-06-18 RX ORDER — PREDNISONE 10 MG/1
TABLET ORAL
Qty: 21 EACH | Refills: 0 | Status: SHIPPED | OUTPATIENT
Start: 2025-06-18

## 2025-06-29 NOTE — PROGRESS NOTES
"Subjective   Hussain Feliz is a 50 y.o. male.     Cough     History of Present Illness  The patient presents for evaluation of a cough.    He has been experiencing an increase in coughing episodes over the past 3 to 4 days, which has prompted his visit today. He reports no recent episodes of dizziness associated with the cough. He has not initiated any over-the-counter treatments or used any inhalers for this symptom. He does have an emergency inhaler at his disposal. He also reports no sinus pressure but notes an increase in mucus production following a recent nasal surgery.    He has sleep apnea and is considering the use of a CPAP machine to improve his energy levels. A recent sleep study indicated mild sleep apnea, and he is advised to trial the CPAP machine to address his symptoms.       The following portions of the patient's history were reviewed and updated as appropriate: allergies, current medications, past family history, past medical history, past social history, past surgical history, and problem list.    Review of Systems   Respiratory:  Positive for cough.      As noted per HPI     Objective   Blood pressure 120/80, pulse 66, temperature 97.3 °F (36.3 °C), height 180.1 cm (70.9\"), weight 115 kg (253 lb), SpO2 99%. Body mass index is 35.39 kg/m².     Physical Exam  Vitals reviewed.   Constitutional:       Appearance: Normal appearance.   HENT:      Head: Normocephalic.      Right Ear: Tympanic membrane, ear canal and external ear normal.      Left Ear: Tympanic membrane, ear canal and external ear normal.      Nose: Congestion present.      Mouth/Throat:      Pharynx: No oropharyngeal exudate or posterior oropharyngeal erythema.   Eyes:      Conjunctiva/sclera: Conjunctivae normal.   Cardiovascular:      Rate and Rhythm: Normal rate.   Pulmonary:      Effort: Pulmonary effort is normal.      Breath sounds: Normal breath sounds.   Neurological:      Mental Status: He is alert. "         Results  Diagnostic Testing   - Sleep study: Mild sleep apnea    Assessment & Plan   Assessment & Plan  1. Cough.  - Increased coughing over the last 3-4 days.  - No dizziness or pressure buildup in sinuses; some mucus production.  - Discussion on sinus drainage likely causing the cough, not a bacterial sinus infection.  - Prednisone prescribed to reduce inflammation and cough syrup for symptom relief; Augmentin prescribed as a precaution.    2. Sleep apnea.  - Classic symptoms of sleep apnea, including fatigue and lack of energy despite adequate sleep.  - Recent sleep study indicated mild sleep apnea.  - Recommendation for a trial of CPAP therapy to improve oxygenation and energy levels.  - Counseling on the benefits of CPAP therapy, including reduced fatigue and improved overall well-being.     Diagnoses and all orders for this visit:    1. Acute URI (Primary)  -     amoxicillin-clavulanate (AUGMENTIN) 875-125 MG per tablet; Take 1 tablet by mouth 2 (Two) Times a Day.  Dispense: 14 tablet; Refill: 0  -     predniSONE (DELTASONE) 10 MG (21) dose pack; Use as directed on package  Dispense: 21 each; Refill: 0  -     benzonatate (Tessalon Perles) 100 MG capsule; Take 1 capsule by mouth 3 (Three) Times a Day As Needed for Cough.  Dispense: 30 capsule; Refill: 0  -     brompheniramine-pseudoephedrine-DM 30-2-10 MG/5ML syrup; Take 5 mL by mouth 4 (Four) Times a Day As Needed for Congestion or Cough.  Dispense: 200 mL; Refill: 0    2. Acute cough  -     benzonatate (Tessalon Perles) 100 MG capsule; Take 1 capsule by mouth 3 (Three) Times a Day As Needed for Cough.  Dispense: 30 capsule; Refill: 0  -     brompheniramine-pseudoephedrine-DM 30-2-10 MG/5ML syrup; Take 5 mL by mouth 4 (Four) Times a Day As Needed for Congestion or Cough.  Dispense: 200 mL; Refill: 0               Patient or patient representative verbalized consent for the use of Ambient Listening during the visit with  JEANNINE Marie for chart  documentation. 6/29/2025  17:44 EDT

## (undated) DEVICE — SOLIDIFIER LIQ PREMISORB 1500CC

## (undated) DEVICE — SYR LUERLOK 50ML

## (undated) DEVICE — TUBING, SUCTION, 1/4" X 10', STRAIGHT: Brand: MEDLINE

## (undated) DEVICE — HYBRID CO2 TUBING/CAP SET FOR OLYMPUS® SCOPES & CO2 SOURCE: Brand: ERBE

## (undated) DEVICE — INTRO ACCSR BLNT TP

## (undated) DEVICE — THE BITE BLOCK MAXI, LATEX FREE STRAP IS USED TO PROTECT THE ENDOSCOPE INSERTION TUBE FROM BEING BITTEN BY THE PATIENT.

## (undated) DEVICE — KT ORCA ORCAPOD DISP STRL

## (undated) DEVICE — AIR/WATER CLEANING VALVES: Brand: AIR/WATER CLEANING VALVES

## (undated) DEVICE — Device

## (undated) DEVICE — LUBE JELLY FOIL PACK 1.4 OZ: Brand: MEDLINE INDUSTRIES, INC.

## (undated) DEVICE — FIRST STEP BEDSIDE ADD WATER KIT - RESEALABLE STAND-UP POUCH, ENDOSCOPIC CLEANING PAD - 1 POUCH: Brand: FIRST STEP BEDSIDE ADD WATER KIT - RESEALABLE STAND-UP POUCH, ENDOSCOPIC CLEANIN

## (undated) DEVICE — ST LINER SAFECAP GRN RED CP STRL

## (undated) DEVICE — SOL IRR H2O BO 1000ML STRL

## (undated) DEVICE — CONTN GRAD MEAS TRIANG 32OZ BLK